# Patient Record
Sex: MALE | Race: WHITE | NOT HISPANIC OR LATINO | Employment: OTHER | URBAN - METROPOLITAN AREA
[De-identification: names, ages, dates, MRNs, and addresses within clinical notes are randomized per-mention and may not be internally consistent; named-entity substitution may affect disease eponyms.]

---

## 2017-03-22 DIAGNOSIS — Z00.00 WELL ADULT EXAM: Primary | ICD-10-CM

## 2017-03-23 ENCOUNTER — RESULTS ENCOUNTER (OUTPATIENT)
Dept: FAMILY MEDICINE CLINIC | Facility: CLINIC | Age: 67
End: 2017-03-23

## 2017-03-23 DIAGNOSIS — Z00.00 WELL ADULT EXAM: ICD-10-CM

## 2017-03-23 LAB
CHOLEST SERPL-MCNC: 216 MG/DL (ref 0–200)
HDLC SERPL-MCNC: 65 MG/DL (ref 40–60)
LDLC SERPL CALC-MCNC: 139 MG/DL (ref 0–100)
LDLC/HDLC SERPL: 2.14 {RATIO}
TRIGL SERPL-MCNC: 61 MG/DL (ref 0–150)
VLDLC SERPL CALC-MCNC: 12.2 MG/DL (ref 5–40)

## 2017-03-24 ENCOUNTER — TELEPHONE (OUTPATIENT)
Dept: FAMILY MEDICINE CLINIC | Facility: CLINIC | Age: 67
End: 2017-03-24

## 2017-03-24 NOTE — TELEPHONE ENCOUNTER
----- Message from Ofelia Bradford MD sent at 3/24/2017  6:16 AM EDT -----  Lipids are only back. I ordered the whole panel.  LDL too high      We tried to get him to do all. He said NO it was only the lipids he was to have. sorry

## 2017-03-28 ENCOUNTER — TELEPHONE (OUTPATIENT)
Dept: FAMILY MEDICINE CLINIC | Facility: CLINIC | Age: 67
End: 2017-03-28

## 2017-03-28 RX ORDER — ATORVASTATIN CALCIUM 10 MG/1
10 TABLET, FILM COATED ORAL DAILY
Qty: 90 TABLET | Refills: 1 | Status: SHIPPED | OUTPATIENT
Start: 2017-03-28 | End: 2017-09-30 | Stop reason: SDUPTHER

## 2017-03-28 NOTE — TELEPHONE ENCOUNTER
i spoke to pt about labs. He thought you where going to put him back on lipitor if numbers were not better. [facility] [place]. Thanks    This may be a duplicate message

## 2017-05-22 DIAGNOSIS — E78.5 HYPERLIPIDEMIA, UNSPECIFIED HYPERLIPIDEMIA TYPE: ICD-10-CM

## 2017-05-22 DIAGNOSIS — R79.89 ELEVATED LFTS: Primary | ICD-10-CM

## 2017-05-22 LAB
ALBUMIN SERPL-MCNC: 4.5 G/DL (ref 3.5–5.2)
ALBUMIN/GLOB SERPL: 2.3 G/DL
ALP SERPL-CCNC: 52 U/L (ref 39–117)
ALT SERPL-CCNC: 31 U/L (ref 1–41)
AST SERPL-CCNC: 22 U/L (ref 1–40)
BILIRUB SERPL-MCNC: 0.6 MG/DL (ref 0.1–1.2)
BUN SERPL-MCNC: 8 MG/DL (ref 8–23)
BUN/CREAT SERPL: 8.2 (ref 7–25)
CALCIUM SERPL-MCNC: 9.6 MG/DL (ref 8.6–10.5)
CHLORIDE SERPL-SCNC: 101 MMOL/L (ref 98–107)
CHOLEST SERPL-MCNC: 137 MG/DL (ref 0–200)
CO2 SERPL-SCNC: 26.2 MMOL/L (ref 22–29)
CREAT SERPL-MCNC: 0.97 MG/DL (ref 0.76–1.27)
GLOBULIN SER CALC-MCNC: 2 GM/DL
GLUCOSE SERPL-MCNC: 94 MG/DL (ref 65–99)
HDLC SERPL-MCNC: 63 MG/DL (ref 40–60)
LDLC SERPL CALC-MCNC: 65 MG/DL (ref 0–100)
LDLC/HDLC SERPL: 1.03 {RATIO}
POTASSIUM SERPL-SCNC: 4.7 MMOL/L (ref 3.5–5.2)
PROT SERPL-MCNC: 6.5 G/DL (ref 6–8.5)
SODIUM SERPL-SCNC: 139 MMOL/L (ref 136–145)
TRIGL SERPL-MCNC: 47 MG/DL (ref 0–150)
VLDLC SERPL CALC-MCNC: 9.4 MG/DL (ref 5–40)

## 2017-10-02 RX ORDER — ATORVASTATIN CALCIUM 10 MG/1
TABLET, FILM COATED ORAL
Qty: 90 TABLET | Refills: 1 | Status: SHIPPED | OUTPATIENT
Start: 2017-10-02 | End: 2018-03-22 | Stop reason: SDUPTHER

## 2018-03-13 DIAGNOSIS — Z00.00 ANNUAL PHYSICAL EXAM: Primary | ICD-10-CM

## 2018-03-14 ENCOUNTER — RESULTS ENCOUNTER (OUTPATIENT)
Dept: FAMILY MEDICINE CLINIC | Facility: CLINIC | Age: 68
End: 2018-03-14

## 2018-03-14 DIAGNOSIS — Z00.00 ANNUAL PHYSICAL EXAM: ICD-10-CM

## 2018-03-14 LAB
25(OH)D3+25(OH)D2 SERPL-MCNC: 46.9 NG/ML (ref 30–100)
ALBUMIN SERPL-MCNC: 4.5 G/DL (ref 3.5–5.2)
ALBUMIN/GLOB SERPL: 2 G/DL
ALP SERPL-CCNC: 58 U/L (ref 39–117)
ALT SERPL-CCNC: 36 U/L (ref 1–41)
APPEARANCE UR: CLEAR
AST SERPL-CCNC: 26 U/L (ref 1–40)
BASOPHILS # BLD AUTO: 0.02 10*3/MM3 (ref 0–0.2)
BASOPHILS NFR BLD AUTO: 0.4 % (ref 0–1.5)
BILIRUB SERPL-MCNC: 0.7 MG/DL (ref 0.1–1.2)
BILIRUB UR QL STRIP: NEGATIVE
BUN SERPL-MCNC: 9 MG/DL (ref 8–23)
BUN/CREAT SERPL: 10.3 (ref 7–25)
CALCIUM SERPL-MCNC: 9.7 MG/DL (ref 8.6–10.5)
CHLORIDE SERPL-SCNC: 98 MMOL/L (ref 98–107)
CHOLEST SERPL-MCNC: 146 MG/DL (ref 0–200)
CO2 SERPL-SCNC: 26.9 MMOL/L (ref 22–29)
COLOR UR: YELLOW
CREAT SERPL-MCNC: 0.87 MG/DL (ref 0.76–1.27)
EOSINOPHIL # BLD AUTO: 0.22 10*3/MM3 (ref 0–0.7)
EOSINOPHIL NFR BLD AUTO: 4.2 % (ref 0.3–6.2)
ERYTHROCYTE [DISTWIDTH] IN BLOOD BY AUTOMATED COUNT: 13.8 % (ref 11.5–14.5)
GFR SERPLBLD CREATININE-BSD FMLA CKD-EPI: 106 ML/MIN/1.73
GFR SERPLBLD CREATININE-BSD FMLA CKD-EPI: 88 ML/MIN/1.73
GLOBULIN SER CALC-MCNC: 2.2 GM/DL
GLUCOSE SERPL-MCNC: 93 MG/DL (ref 65–99)
GLUCOSE UR QL: NEGATIVE
HCT VFR BLD AUTO: 49.9 % (ref 40.4–52.2)
HDLC SERPL-MCNC: 55 MG/DL (ref 40–60)
HGB BLD-MCNC: 16.6 G/DL (ref 13.7–17.6)
HGB UR QL STRIP: NEGATIVE
IMM GRANULOCYTES # BLD: 0.02 10*3/MM3 (ref 0–0.03)
IMM GRANULOCYTES NFR BLD: 0.4 % (ref 0–0.5)
KETONES UR QL STRIP: NEGATIVE
LDLC SERPL CALC-MCNC: 79 MG/DL (ref 0–100)
LDLC/HDLC SERPL: 1.44 {RATIO}
LEUKOCYTE ESTERASE UR QL STRIP: NEGATIVE
LYMPHOCYTES # BLD AUTO: 1.06 10*3/MM3 (ref 0.9–4.8)
LYMPHOCYTES NFR BLD AUTO: 20.1 % (ref 19.6–45.3)
MCH RBC QN AUTO: 30.7 PG (ref 27–32.7)
MCHC RBC AUTO-ENTMCNC: 33.3 G/DL (ref 32.6–36.4)
MCV RBC AUTO: 92.4 FL (ref 79.8–96.2)
MONOCYTES # BLD AUTO: 0.57 10*3/MM3 (ref 0.2–1.2)
MONOCYTES NFR BLD AUTO: 10.8 % (ref 5–12)
NEUTROPHILS # BLD AUTO: 3.38 10*3/MM3 (ref 1.9–8.1)
NEUTROPHILS NFR BLD AUTO: 64.1 % (ref 42.7–76)
NITRITE UR QL STRIP: NEGATIVE
PH UR STRIP: 7.5 [PH] (ref 5–8)
PLATELET # BLD AUTO: 189 10*3/MM3 (ref 140–500)
POTASSIUM SERPL-SCNC: 4.4 MMOL/L (ref 3.5–5.2)
PROT SERPL-MCNC: 6.7 G/DL (ref 6–8.5)
PROT UR QL STRIP: NEGATIVE
PSA SERPL-MCNC: 1.01 NG/ML (ref 0–4)
RBC # BLD AUTO: 5.4 10*6/MM3 (ref 4.6–6)
SODIUM SERPL-SCNC: 138 MMOL/L (ref 136–145)
SP GR UR: 1.01 (ref 1–1.03)
T4 FREE SERPL-MCNC: 1.19 NG/DL (ref 0.93–1.7)
TRIGL SERPL-MCNC: 60 MG/DL (ref 0–150)
TSH SERPL DL<=0.005 MIU/L-ACNC: 4.3 MIU/ML (ref 0.27–4.2)
UROBILINOGEN UR STRIP-MCNC: NORMAL MG/DL
VLDLC SERPL CALC-MCNC: 12 MG/DL (ref 5–40)
WBC # BLD AUTO: 5.27 10*3/MM3 (ref 4.5–10.7)

## 2018-03-22 ENCOUNTER — OFFICE VISIT (OUTPATIENT)
Dept: FAMILY MEDICINE CLINIC | Facility: CLINIC | Age: 68
End: 2018-03-22

## 2018-03-22 VITALS
RESPIRATION RATE: 18 BRPM | WEIGHT: 193.5 LBS | SYSTOLIC BLOOD PRESSURE: 128 MMHG | BODY MASS INDEX: 28.66 KG/M2 | OXYGEN SATURATION: 98 % | HEART RATE: 66 BPM | DIASTOLIC BLOOD PRESSURE: 73 MMHG | HEIGHT: 69 IN | TEMPERATURE: 98.2 F

## 2018-03-22 DIAGNOSIS — E78.5 HYPERLIPIDEMIA, UNSPECIFIED HYPERLIPIDEMIA TYPE: ICD-10-CM

## 2018-03-22 DIAGNOSIS — R25.1 TREMOR OF BOTH HANDS: ICD-10-CM

## 2018-03-22 DIAGNOSIS — R03.0 ELEVATED BLOOD-PRESSURE READING WITHOUT DIAGNOSIS OF HYPERTENSION: ICD-10-CM

## 2018-03-22 DIAGNOSIS — Z23 ENCOUNTER FOR IMMUNIZATION: Primary | ICD-10-CM

## 2018-03-22 DIAGNOSIS — R79.89 ELEVATED TSH: ICD-10-CM

## 2018-03-22 DIAGNOSIS — Z00.00 WELL ADULT EXAM: ICD-10-CM

## 2018-03-22 DIAGNOSIS — Z00.00 HEALTHCARE MAINTENANCE: ICD-10-CM

## 2018-03-22 PROCEDURE — 90471 IMMUNIZATION ADMIN: CPT | Performed by: INTERNAL MEDICINE

## 2018-03-22 PROCEDURE — G0009 ADMIN PNEUMOCOCCAL VACCINE: HCPCS | Performed by: INTERNAL MEDICINE

## 2018-03-22 PROCEDURE — 90714 TD VACC NO PRESV 7 YRS+ IM: CPT | Performed by: INTERNAL MEDICINE

## 2018-03-22 PROCEDURE — 99397 PER PM REEVAL EST PAT 65+ YR: CPT | Performed by: INTERNAL MEDICINE

## 2018-03-22 PROCEDURE — 90670 PCV13 VACCINE IM: CPT | Performed by: INTERNAL MEDICINE

## 2018-03-22 RX ORDER — CLONAZEPAM 0.5 MG/1
0.5 TABLET ORAL DAILY PRN
Qty: 90 TABLET | Refills: 0 | Status: SHIPPED | OUTPATIENT
Start: 2018-03-22 | End: 2019-08-23 | Stop reason: SDUPTHER

## 2018-03-22 RX ORDER — ATORVASTATIN CALCIUM 10 MG/1
10 TABLET, FILM COATED ORAL DAILY
Qty: 90 TABLET | Refills: 1 | Status: SHIPPED | OUTPATIENT
Start: 2018-03-22 | End: 2018-10-18 | Stop reason: SDUPTHER

## 2018-03-22 NOTE — PROGRESS NOTES
Subjective   Paul Sterling is a 68 y.o. male who comes in today for   Chief Complaint   Patient presents with   • Annual Exam     no cxr. did sign ABN    .    History of Present Illness   Here for CPE.  Has had 6-10 episodes of vertigo past 18 months.  Saw ENT Dr. Riley.  Only starts in night and room spins and is started with rolling over and last for about 24 hours but does improve throughout the day.  They checked his hearing and it is at his baseline.  He has tinnitus that comes and goes and really doesn't bother him.  Vision is normal.  No ha but did have ha as a younger man.  No N/V.  His blood pressure usually runs 140-165/100-120 at home and at [facility] the other day it was elevated too.  Has HL on lipitor and baby asa/day.  He is due for cscope due to polyp and sees Dr. Gordon and he sent in paper work yesterday. Has h/o OAB and has failed vesicare, ditropan due to dry mouth.  2 x nocturia and a lot of urinary urgency and frequency    The following portions of the patient's history were reviewed and updated as appropriate: allergies, current medications, past family history, past medical history, past social history, past surgical history and problem list.    Review of Systems   HENT: Positive for hearing loss (long standing) and tinnitus.    Eyes: Negative.    Respiratory: Negative.    Cardiovascular: Negative.  Negative for palpitations.   Gastrointestinal: Negative.    Neurological:        Vertigo episodes       Vitals:    03/22/18 1058   BP: 128/73   Resp: 18   Temp: 98.2 °F (36.8 °C)   SpO2: 98%       Objective   Physical Exam   Constitutional: He is oriented to person, place, and time. He appears well-developed and well-nourished.   HENT:   Head: Normocephalic and atraumatic.   Right Ear: External ear normal.   Left Ear: External ear normal.   Mouth/Throat: Oropharynx is clear and moist.   Eyes: Conjunctivae and EOM are normal. Pupils are equal, round, and reactive to light.   Neck: Neck supple.  No thyromegaly present.   Cardiovascular: Normal rate, regular rhythm, normal heart sounds and intact distal pulses.    Pulmonary/Chest: Effort normal and breath sounds normal.   Abdominal: Soft. Bowel sounds are normal. He exhibits no distension and no mass. There is no tenderness.   Genitourinary: Prostate normal. Rectal exam shows guaiac negative stool.   Lymphadenopathy:     He has no cervical adenopathy.   Neurological: He is alert and oriented to person, place, and time. He has normal reflexes.   Skin: Skin is warm.   Psychiatric: He has a normal mood and affect. His behavior is normal. Judgment and thought content normal.   Nursing note and vitals reviewed.      Assessment/Plan   Paul was seen today for annual exam.    Diagnoses and all orders for this visit:    Encounter for immunization  -     Cancel: Tdap Vaccine Greater Than or Equal To 8yo IM  -     Pneumococcal Conjugate Vaccine 13-Valent All  -     Td (adult) Vaccine Not Adsorbed    Tremor of both hands    Hyperlipidemia, unspecified hyperlipidemia type    Elevated blood-pressure reading without diagnosis of hypertension    Elevated TSH  -     T3, Free; Future  -     T4, Free; Future  -     TSH; Future  -     Anti-Thyroglobulin Antibody; Future  -     Thyroid Peroxidase Antibody; Future  -     Hepatitis C Antibody; Future    Healthcare maintenance  -     T3, Free; Future  -     T4, Free; Future  -     TSH; Future  -     Anti-Thyroglobulin Antibody; Future  -     Thyroid Peroxidase Antibody; Future  -     Hepatitis C Antibody; Future    Other orders  -     clonazePAM (KlonoPIN) 0.5 MG tablet; Take 1 tablet by mouth Daily As Needed (HAND TREMORS).  -     atorvastatin (LIPITOR) 10 MG tablet; Take 1 tablet by mouth Daily.    PCV today  Td today  Recheck TSH in 2 -3 months due to elevation and check thyroid antibodies  Refill klonapin for essential tremors.  #90 lasted him 18 months  Refill lipitor  He will monitor his bp and get back to me if it continues  to run on the higher side.    We discussed vertigo and that hydration and movement are often triggers.  He is getting a new bed where he can elevated HOB due to his long standing GERD sx's.  If this doesn't help, he will call me for vestibular PT  For his OAB sx's, I have given him samples of myrbetriq 50mg qd and cautioned him to monitor his blood pressure.  He will call for rx if he likes it.  Prostate is normal.  psa normal.                 I have asked for the patient to return to clinic in 12month(s).

## 2018-03-23 PROCEDURE — 93000 ELECTROCARDIOGRAM COMPLETE: CPT | Performed by: INTERNAL MEDICINE

## 2018-03-23 NOTE — PROGRESS NOTES
Procedure     ECG 12 Lead  Date/Time: 3/23/2018 8:15 AM  Performed by: JEAN-PIERRE RESENDEZ  Authorized by: JEAN-PIERRE RESENDEZ   Comparison: compared with previous ECG   Similar to previous ECG  Rhythm: sinus rhythm  Rate: normal  Conduction: conduction normal  ST Segments: ST segments normal  T Waves: T waves normal  QRS axis: normal  Clinical impression: normal ECG

## 2018-03-27 ENCOUNTER — PREP FOR SURGERY (OUTPATIENT)
Dept: OTHER | Facility: HOSPITAL | Age: 68
End: 2018-03-27

## 2018-03-27 DIAGNOSIS — Z12.11 COLON CANCER SCREENING: Primary | ICD-10-CM

## 2018-03-27 DIAGNOSIS — Z87.19 HISTORY OF BARRETT'S ESOPHAGUS: ICD-10-CM

## 2018-03-27 DIAGNOSIS — K62.5 RECTAL BLEEDING: ICD-10-CM

## 2018-03-27 DIAGNOSIS — K21.9 GASTROESOPHAGEAL REFLUX DISEASE, ESOPHAGITIS PRESENCE NOT SPECIFIED: ICD-10-CM

## 2018-03-27 DIAGNOSIS — Z83.71 FAMILY HISTORY OF COLONIC POLYPS: ICD-10-CM

## 2018-03-27 DIAGNOSIS — Z80.0 FAMILY HISTORY OF COLON CANCER: ICD-10-CM

## 2018-04-03 ENCOUNTER — TELEPHONE (OUTPATIENT)
Dept: FAMILY MEDICINE CLINIC | Facility: CLINIC | Age: 68
End: 2018-04-03

## 2018-04-03 NOTE — TELEPHONE ENCOUNTER
Patient called to let Dr Bradford know that he did like the sample of the MYRBETRIQ 50MG, he is ok with starting a full prescription. Patient also mentioned that it would probably not be approved by insurance and wanted to know the process he should  take

## 2018-04-04 NOTE — TELEPHONE ENCOUNTER
Per pt he has tried detrol vesicare and ditropan all causing the dry mouth.     The myrbetriq is working great and not causing any dry mouth

## 2018-04-04 NOTE — TELEPHONE ENCOUNTER
I sent the prescription to [facility] and once they process the prescription, we will run the PA if needed.  He has failed oxybutynin and another one? detrol or vesicare or ditropan?  Once we complete the form saying he failed those (dry mouth and side effects), they may approve myrbetriq

## 2018-04-12 PROBLEM — Z12.11 COLON CANCER SCREENING: Status: ACTIVE | Noted: 2018-04-12

## 2018-04-12 PROBLEM — Z83.719 FAMILY HISTORY OF COLONIC POLYPS: Status: ACTIVE | Noted: 2018-04-12

## 2018-04-12 PROBLEM — K21.9 GASTROESOPHAGEAL REFLUX DISEASE: Status: ACTIVE | Noted: 2018-04-12

## 2018-04-12 PROBLEM — Z87.19 HISTORY OF BARRETT'S ESOPHAGUS: Status: ACTIVE | Noted: 2018-04-12

## 2018-04-12 PROBLEM — Z80.0 FAMILY HISTORY OF COLON CANCER: Status: ACTIVE | Noted: 2018-04-12

## 2018-04-12 PROBLEM — K62.5 RECTAL BLEEDING: Status: ACTIVE | Noted: 2018-04-12

## 2018-04-12 PROBLEM — Z83.71 FAMILY HISTORY OF COLONIC POLYPS: Status: ACTIVE | Noted: 2018-04-12

## 2018-04-24 ENCOUNTER — TELEPHONE (OUTPATIENT)
Dept: FAMILY MEDICINE CLINIC | Facility: CLINIC | Age: 68
End: 2018-04-24

## 2018-04-24 DIAGNOSIS — Z79.899 HIGH RISK MEDICATION USE: Primary | ICD-10-CM

## 2018-04-24 RX ORDER — LOSARTAN POTASSIUM 25 MG/1
25 TABLET ORAL DAILY
Qty: 30 TABLET | Refills: 1 | Status: SHIPPED | OUTPATIENT
Start: 2018-04-24 | End: 2018-06-25 | Stop reason: SDUPTHER

## 2018-04-24 NOTE — TELEPHONE ENCOUNTER
Patients blood pressures are elevated.   (please see scanned document)   Left a message for pt to call me back and let me know if he is okay with starting losartan 25mg daily and then rechecking labs(CMP) in 2 weeks

## 2018-05-07 DIAGNOSIS — E87.5 SERUM POTASSIUM ELEVATED: Primary | ICD-10-CM

## 2018-05-08 ENCOUNTER — RESULTS ENCOUNTER (OUTPATIENT)
Dept: FAMILY MEDICINE CLINIC | Facility: CLINIC | Age: 68
End: 2018-05-08

## 2018-05-08 DIAGNOSIS — Z79.899 HIGH RISK MEDICATION USE: ICD-10-CM

## 2018-05-09 LAB
ALBUMIN SERPL-MCNC: 4.4 G/DL (ref 3.5–5.2)
ALBUMIN/GLOB SERPL: 2.6 G/DL
ALP SERPL-CCNC: 52 U/L (ref 39–117)
ALT SERPL-CCNC: 36 U/L (ref 1–41)
AST SERPL-CCNC: 28 U/L (ref 1–40)
BILIRUB SERPL-MCNC: 0.6 MG/DL (ref 0.1–1.2)
BUN SERPL-MCNC: 13 MG/DL (ref 8–23)
BUN/CREAT SERPL: 14.4 (ref 7–25)
CALCIUM SERPL-MCNC: 9.3 MG/DL (ref 8.6–10.5)
CHLORIDE SERPL-SCNC: 102 MMOL/L (ref 98–107)
CO2 SERPL-SCNC: 24.8 MMOL/L (ref 22–29)
CREAT SERPL-MCNC: 0.9 MG/DL (ref 0.76–1.27)
GFR SERPLBLD CREATININE-BSD FMLA CKD-EPI: 102 ML/MIN/1.73
GFR SERPLBLD CREATININE-BSD FMLA CKD-EPI: 84 ML/MIN/1.73
GLOBULIN SER CALC-MCNC: 1.7 GM/DL
GLUCOSE SERPL-MCNC: 101 MG/DL (ref 65–99)
POTASSIUM SERPL-SCNC: 4.3 MMOL/L (ref 3.5–5.2)
PROT SERPL-MCNC: 6.1 G/DL (ref 6–8.5)
SODIUM SERPL-SCNC: 141 MMOL/L (ref 136–145)

## 2018-06-20 ENCOUNTER — ANESTHESIA (OUTPATIENT)
Dept: GASTROENTEROLOGY | Facility: HOSPITAL | Age: 68
End: 2018-06-20

## 2018-06-20 ENCOUNTER — RESULTS ENCOUNTER (OUTPATIENT)
Dept: FAMILY MEDICINE CLINIC | Facility: CLINIC | Age: 68
End: 2018-06-20

## 2018-06-20 ENCOUNTER — HOSPITAL ENCOUNTER (OUTPATIENT)
Facility: HOSPITAL | Age: 68
Setting detail: HOSPITAL OUTPATIENT SURGERY
Discharge: HOME | End: 2018-06-20
Attending: SURGERY | Admitting: SURGERY

## 2018-06-20 ENCOUNTER — ANESTHESIA EVENT (OUTPATIENT)
Dept: GASTROENTEROLOGY | Facility: HOSPITAL | Age: 68
End: 2018-06-20

## 2018-06-20 VITALS
WEIGHT: 188.13 LBS | SYSTOLIC BLOOD PRESSURE: 116 MMHG | HEART RATE: 66 BPM | BODY MASS INDEX: 27.86 KG/M2 | TEMPERATURE: 98.3 F | HEIGHT: 69 IN | DIASTOLIC BLOOD PRESSURE: 81 MMHG | RESPIRATION RATE: 12 BRPM | OXYGEN SATURATION: 100 %

## 2018-06-20 DIAGNOSIS — Z80.0 FAMILY HISTORY OF COLON CANCER: ICD-10-CM

## 2018-06-20 DIAGNOSIS — Z83.71 FAMILY HISTORY OF COLONIC POLYPS: ICD-10-CM

## 2018-06-20 DIAGNOSIS — K21.9 GASTROESOPHAGEAL REFLUX DISEASE, ESOPHAGITIS PRESENCE NOT SPECIFIED: ICD-10-CM

## 2018-06-20 DIAGNOSIS — K62.5 RECTAL BLEEDING: ICD-10-CM

## 2018-06-20 DIAGNOSIS — Z87.19 HISTORY OF BARRETT'S ESOPHAGUS: ICD-10-CM

## 2018-06-20 DIAGNOSIS — R79.89 ELEVATED TSH: ICD-10-CM

## 2018-06-20 DIAGNOSIS — Z00.00 HEALTHCARE MAINTENANCE: ICD-10-CM

## 2018-06-20 DIAGNOSIS — Z12.11 COLON CANCER SCREENING: ICD-10-CM

## 2018-06-20 PROCEDURE — S0260 H&P FOR SURGERY: HCPCS | Performed by: SURGERY

## 2018-06-20 PROCEDURE — 45380 COLONOSCOPY AND BIOPSY: CPT | Performed by: SURGERY

## 2018-06-20 PROCEDURE — 43239 EGD BIOPSY SINGLE/MULTIPLE: CPT | Performed by: SURGERY

## 2018-06-20 PROCEDURE — 88305 TISSUE EXAM BY PATHOLOGIST: CPT | Performed by: SURGERY

## 2018-06-20 PROCEDURE — 25010000002 PROPOFOL 10 MG/ML EMULSION: Performed by: ANESTHESIOLOGY

## 2018-06-20 RX ORDER — SODIUM CHLORIDE, SODIUM LACTATE, POTASSIUM CHLORIDE, CALCIUM CHLORIDE 600; 310; 30; 20 MG/100ML; MG/100ML; MG/100ML; MG/100ML
1000 INJECTION, SOLUTION INTRAVENOUS CONTINUOUS
Status: DISCONTINUED | OUTPATIENT
Start: 2018-06-20 | End: 2018-06-20 | Stop reason: HOSPADM

## 2018-06-20 RX ORDER — LIDOCAINE HYDROCHLORIDE 10 MG/ML
0.5 INJECTION, SOLUTION INFILTRATION; PERINEURAL ONCE AS NEEDED
Status: DISCONTINUED | OUTPATIENT
Start: 2018-06-20 | End: 2018-06-20 | Stop reason: HOSPADM

## 2018-06-20 RX ORDER — LIDOCAINE HYDROCHLORIDE 20 MG/ML
INJECTION, SOLUTION INFILTRATION; PERINEURAL AS NEEDED
Status: DISCONTINUED | OUTPATIENT
Start: 2018-06-20 | End: 2018-06-20 | Stop reason: SURG

## 2018-06-20 RX ORDER — SODIUM CHLORIDE 0.9 % (FLUSH) 0.9 %
3 SYRINGE (ML) INJECTION AS NEEDED
Status: DISCONTINUED | OUTPATIENT
Start: 2018-06-20 | End: 2018-06-20 | Stop reason: HOSPADM

## 2018-06-20 RX ORDER — PROPOFOL 10 MG/ML
VIAL (ML) INTRAVENOUS AS NEEDED
Status: DISCONTINUED | OUTPATIENT
Start: 2018-06-20 | End: 2018-06-20 | Stop reason: SURG

## 2018-06-20 RX ADMIN — LIDOCAINE HYDROCHLORIDE 100 MG: 20 INJECTION, SOLUTION INFILTRATION; PERINEURAL at 08:00

## 2018-06-20 RX ADMIN — PROPOFOL 550 MG: 10 INJECTION, EMULSION INTRAVENOUS at 08:00

## 2018-06-20 RX ADMIN — SODIUM CHLORIDE, POTASSIUM CHLORIDE, SODIUM LACTATE AND CALCIUM CHLORIDE 1000 ML: 600; 310; 30; 20 INJECTION, SOLUTION INTRAVENOUS at 07:32

## 2018-06-20 RX ADMIN — SODIUM CHLORIDE, POTASSIUM CHLORIDE, SODIUM LACTATE AND CALCIUM CHLORIDE: 600; 310; 30; 20 INJECTION, SOLUTION INTRAVENOUS at 08:00

## 2018-06-20 NOTE — H&P
Cc: History of Noonan's esophagus, colon polyps and family history of colon cancer                Paul Sterling is a 68 y.o. male who has a history of Noonan's esophagus and last had a EGD and colonoscopy 4 years ago.  Colonoscopy found polyps at that time.  He also has a history of colon cancer in his mother.            Past Medical History:   Diagnosis Date   • Arthritis    • Noonan's esophagus    • Blood in stool    • GERD (gastroesophageal reflux disease)    • History of colonic polyps    • Hyperlipidemia    • Hypertension        Past Surgical History:   Procedure Laterality Date   • CHOLECYSTECTOMY     • COLONOSCOPY  2015   • INGUINAL HERNIA REPAIR Bilateral    • NISSEN FUNDOPLICATION LAPAROSCOPIC     • UMBILICAL HERNIA REPAIR           Current Facility-Administered Medications:   •  lactated ringers infusion 1,000 mL, 1,000 mL, Intravenous, Continuous, Stuart Hernandez MD, Last Rate: 25 mL/hr at 06/20/18 0732, 1,000 mL at 06/20/18 0732  •  lidocaine (XYLOCAINE) 1 % injection 0.5 mL, 0.5 mL, Intradermal, Once PRN, Stuart Hernandez MD  •  sodium chloride 0.9 % flush 3 mL, 3 mL, Intravenous, PRN, Stuart Hernandez MD    No Known Allergies    Family History   Problem Relation Age of Onset   • Malig Hyperthermia Neg Hx        Social History     Social History   • Marital status:      Spouse name: N/A   • Number of children: N/A   • Years of education: N/A     Occupational History   • Not on file.     Social History Main Topics   • Smoking status: Never Smoker   • Smokeless tobacco: Never Used   • Alcohol use Yes   • Drug use: No   • Sexual activity: Not on file     Other Topics Concern   • Not on file     Social History Narrative   • No narrative on file       ROS: 14 systems were reviewed and negative other than presenting complaints.    Vitals:    06/20/18 0725   BP: 134/90   Pulse: 64   Resp: 16   Temp: 97.8 °F (36.6 °C)   SpO2: 97%       PHYSICAL EXAM:  - Constitutional: Well-developed  well-nourished, no acute distress  - Eyes: Conjunctiva normal, sclera nonicteric  - ENMT: Hearing grossly normal, oral mucosa moist  - Respiratory: Clear to auscultation, normal inspiratory effort  - Cardiovascular: Regular rate, no peripheral edema, no jugular venous distention  - Gastrointestinal: Soft, nontender, no palpable mass, no hepatosplenomegaly  - Skin: Warm, dry  - Musculoskeletal: Symmetric strength, normal gait  - Psychiatric: Alert and oriented ×3, normal affect          Assessment/Plan     · History of Noonan's disease of the esophagus  · Personal history of colon Polyps  · Family history colon cancer    Plan for EGD and colonoscopy today.    Indications, risks and procedure were discussed with the patient including but not limited to bleeding, infection, possibility of perforation and possible polypectomy. All of the patient's questions were answered and they would like to proceed with the above recommendations.    Stuart Hernandez MD  General and Endoscopic Surgery  [facility]    [address], [address]  [place], [place], [address]  P: [phone]  F: [phone]

## 2018-06-20 NOTE — OP NOTE
Operative Note :   Stuart Hernandez MD    Paul Harveymikel  1950    Procedure Date: 06/20/18    Pre-op Diagnosis:  · Personal history of Noonan's disease  · Personal history of colon polyps  · Family history of colon cancer    Post-op Diagnosis:  · Mild esophagitis  · Small hiatal hernia  · Colon polyps  · Hemorrhoids    Procedure:   · Esophagogastroduodenoscopy with biopsy of distal esophagus  · Colonoscopy to cecum with cold forceps polypectomy    Surgeon: Stuart Hernandez MD      Anesthesia: MAC    Indications:  · 68-year-old gentleman who last had endoscopy 4 years ago.  He carries a personal history of Noonan's disease of the esophagus and had polyps found at his last colonoscopy.    Findings:   · Small hiatal hernia  · Mild irregularity of the distal esophagus consistent with possible esophagitis  · Benign appearing colon polyps  · Single small mildly inflamed hemorrhoid    Recommendations:   · Follow up on pathology to give recommendations on repeat endoscopy    Description of procedure:    After obtaining informed consent, patient was brought to the endoscopy suite and was sedated.  Flexible endoscope was inserted into the mouth and passed through the cricopharyngeus into the esophagus and through the GE junction into the stomach and then passed the pylorus and into the duodenum.  The duodenum had an unremarkable appearance.  The junction of the second and third portion of the duodenum reached.  Scope was then pulled back.  The papilla was seen and was unremarkable.  The pyloric channel and duodenal bulb were normal.  Stomach was unremarkable.  No ulceration.  Scope was retroflexed and there was a small sliding type hiatus hernia noted.  Scope was then pulled back to the level of the GE junction which showed only very mild irregularity.  Biopsies were taken a couple of centimeters above the Z line.  Remainder of the esophagus was unremarkable.  The patient was then turned around and prepared for  colonoscopy.  Digital rectal exam was undertaken and was remarkable only for a small hemorrhoid.  The flexible Olympus endoscope was inserted into the rectum and passed around with moderate difficulty requiring external pressure to reach the level of the cecum.  The colonoscope was then slowly withdrawn, carefully visualizing all mucosal surfaces.  Cecum was normal.  In the ascending colon there was a single small benign-appearing polyp which was removed with the polypectomy forceps.  At the hepatic flexure there were a couple of small benign-appearing polyps which were removed.  The transverse colon and splenic flexure were normal.  In the descending colon at about 65 cm another single benign-appearing polyp was removed with the polypectomy forceps.  Sigmoid colon was normal.  No diverticula were identified.  The rectum was unremarkable.  In the anal canal there was a small single mildly inflamed hemorrhoid noted.  No bleeding.  Scope was removed completely.  The patient tolerated the procedure well.      Stuart Hernandez MD  General and Endoscopic Surgery  [facility]    [address], [address]  [place], [place], [address]  P: [phone]  F: [phone]

## 2018-06-20 NOTE — ANESTHESIA POSTPROCEDURE EVALUATION
"Patient: Paul Sterling    Procedure Summary     Date:  06/20/18 Room / Location:   [facility]    Anesthesia Start:  0804 Anesthesia Stop:  0849    Procedures:       ESOPHAGOGASTRODUODENOSCOPY with bx (N/A Esophagus)      COLONOSCOPY to ccecum with cold bx polypectomy (N/A ) Diagnosis:       Rectal bleeding      Family history of colon cancer      Family history of colonic polyps      Colon cancer screening      History of Noonan's esophagus      Gastroesophageal reflux disease, esophagitis presence not specified      (Rectal bleeding [K62.5])      (Family history of colon cancer [Z80.0])      (Family history of colonic polyps [Z83.71])      (Colon cancer screening [Z12.11])      (History of Noonan's esophagus [Z87.19])      (Gastroesophageal reflux disease, esophagitis presence not specified [K21.9])    Surgeon:  Stuart Hernandez MD Provider:  Yvon Joaquin MD    Anesthesia Type:  MAC ASA Status:  3          Anesthesia Type: MAC  Last vitals  BP   109/73 (06/20/18 0847)   Temp   36.8 °C (98.3 °F) (06/20/18 0847)   Pulse   66 (06/20/18 0847)   Resp   12 (06/20/18 0847)     SpO2   98 % (06/20/18 0847)     Post Anesthesia Care and Evaluation    Patient location during evaluation: bedside  Patient participation: complete - patient participated  Level of consciousness: awake and alert  Pain management: adequate  Airway patency: patent  Anesthetic complications: No anesthetic complications    Cardiovascular status: acceptable  Respiratory status: acceptable  Hydration status: acceptable    Comments: /73   Pulse 66   Temp 36.8 °C (98.3 °F) (Oral)   Resp 12   Ht 175.3 cm (69\")   Wt 85.3 kg (188 lb 2 oz)   SpO2 98%   BMI 27.78 kg/m²       "

## 2018-06-20 NOTE — ANESTHESIA PREPROCEDURE EVALUATION
Anesthesia Evaluation     Patient summary reviewed and Nursing notes reviewed   NPO Solid Status: > 8 hours  NPO Liquid Status: > 8 hours           Airway   Mallampati: II  TM distance: >3 FB  Neck ROM: full  no difficulty expected  Dental - normal exam     Pulmonary - normal exam   Cardiovascular - normal exam    (+) hypertension, hyperlipidemia,       Neuro/Psych  (+) tremors,     GI/Hepatic/Renal/Endo    (+)  GERD, GI bleeding,     Musculoskeletal     Abdominal  - normal exam   Substance History      OB/GYN          Other                        Anesthesia Plan    ASA 3     MAC     Anesthetic plan and risks discussed with patient.

## 2018-06-21 LAB
LAB AP CASE REPORT: NORMAL
Lab: NORMAL
PATH REPORT.FINAL DX SPEC: NORMAL
PATH REPORT.GROSS SPEC: NORMAL

## 2018-06-22 ENCOUNTER — TELEPHONE (OUTPATIENT)
Dept: FAMILY MEDICINE CLINIC | Facility: CLINIC | Age: 68
End: 2018-06-22

## 2018-06-25 RX ORDER — LOSARTAN POTASSIUM 25 MG/1
25 TABLET ORAL DAILY
Qty: 30 TABLET | Refills: 5 | Status: SHIPPED | OUTPATIENT
Start: 2018-06-25 | End: 2018-12-17 | Stop reason: SDUPTHER

## 2018-07-02 ENCOUNTER — TELEPHONE (OUTPATIENT)
Dept: FAMILY MEDICINE CLINIC | Facility: CLINIC | Age: 68
End: 2018-07-02

## 2018-07-02 ENCOUNTER — TELEPHONE (OUTPATIENT)
Dept: SURGERY | Facility: CLINIC | Age: 68
End: 2018-07-02

## 2018-07-02 NOTE — TELEPHONE ENCOUNTER
Pt left VM stating that he was supposed to come and have labs drawn to check kidney functions since starting on Losartan. He was that there were multiple other test ordered that he was unaware of. He would like to only get his kidneys checked unless he missed something. Per pt, there is no need for other test. Please advise.

## 2018-07-02 NOTE — TELEPHONE ENCOUNTER
Please tell patient his esophagus was normal and he had 2 benign polyps.  Recommend repeat colonoscopy in 5 years.  Please place and in recall for that time.

## 2018-07-03 NOTE — TELEPHONE ENCOUNTER
During his CPE, his Tsh was elevated therefore I had ordered additional thyroid studies to f/u on that as well as hep C antibody --for health maintenance purposes.  Those labs can be canceled if he choses

## 2018-09-11 ENCOUNTER — OFFICE VISIT (OUTPATIENT)
Dept: FAMILY MEDICINE CLINIC | Facility: CLINIC | Age: 68
End: 2018-09-11

## 2018-09-11 VITALS
WEIGHT: 192.2 LBS | OXYGEN SATURATION: 99 % | HEART RATE: 70 BPM | BODY MASS INDEX: 28.47 KG/M2 | RESPIRATION RATE: 16 BRPM | DIASTOLIC BLOOD PRESSURE: 78 MMHG | HEIGHT: 69 IN | SYSTOLIC BLOOD PRESSURE: 128 MMHG | TEMPERATURE: 97.8 F

## 2018-09-11 DIAGNOSIS — E78.5 HYPERLIPIDEMIA, UNSPECIFIED HYPERLIPIDEMIA TYPE: Primary | ICD-10-CM

## 2018-09-11 DIAGNOSIS — R25.1 TREMOR OF BOTH HANDS: ICD-10-CM

## 2018-09-11 DIAGNOSIS — I10 HYPERTENSION, UNSPECIFIED TYPE: ICD-10-CM

## 2018-09-11 PROCEDURE — 99214 OFFICE O/P EST MOD 30 MIN: CPT | Performed by: INTERNAL MEDICINE

## 2018-09-11 NOTE — PROGRESS NOTES
Subjective   Paul Sterling is a 68 y.o. male who comes in today for   Chief Complaint   Patient presents with   • Hypertension   .    Hypertension   This is a recurrent problem. The current episode started more than 1 month ago. The problem has been gradually improving since onset. The problem is controlled. Pertinent negatives include no anxiety, blurred vision, chest pain, headaches, malaise/fatigue, neck pain, orthopnea, palpitations, peripheral edema, PND, shortness of breath or sweats. Compliance problems include diet.       HERE FOR F/U ON [facility] FOR HTN . STARTED LOSARTAN 25 MG QD ABOUT 6 MONTHS AGO.  ALSO ON LIPITOR FOR HL.  TAKING CLONAZEPAM FOR ESSENTIAL TREMOR THAT HE RARELY TAKES.  DOESN'T NEED REFILL YET.  DIZZINESS HAS RESOLVED AFTER HE WAS ON THE LOSARTAN FOR A WHILE.  CSCOPE IN 6/2018 SHOWED 3 TUBULAR ADENOMAS . H/O BARRETTS ESOPHAGUS.   SEES DERM TWICE A YEAR DUE TO H/O SCC  The following portions of the patient's history were reviewed and updated as appropriate: allergies, current medications, past family history, past medical history, past social history, past surgical history and problem list.    Review of Systems   Constitutional: Negative.  Negative for malaise/fatigue.   Eyes: Negative for blurred vision.   Respiratory: Negative for shortness of breath.    Cardiovascular: Negative for chest pain, palpitations, orthopnea and PND.   Musculoskeletal: Negative for neck pain.   Neurological: Negative for headaches.       Vitals:    09/11/18 1459   BP: 128/78   Pulse: 70   Resp: 16   Temp: 97.8 °F (36.6 °C)   SpO2: 99%       Objective   Physical Exam   Constitutional: He is oriented to person, place, and time. He appears well-developed and well-nourished.   HENT:   Head: Normocephalic and atraumatic.   Right Ear: External ear normal.   Left Ear: External ear normal.   Mouth/Throat: Oropharynx is clear and moist.   Eyes: Conjunctivae are normal.   Neck: Neck supple.   Cardiovascular: Normal  rate, regular rhythm and normal heart sounds.    No bruits   Pulmonary/Chest: Effort normal and breath sounds normal. No respiratory distress. He has no wheezes. He has no rales.   Abdominal: Soft. Bowel sounds are normal. He exhibits no distension and no mass. There is no tenderness.   Lymphadenopathy:     He has no cervical adenopathy.   Neurological: He is alert and oriented to person, place, and time.   Skin: Skin is warm.   Psychiatric: He has a normal mood and affect. His behavior is normal. Judgment and thought content normal.   Nursing note and vitals reviewed.      Assessment/Plan   Paul was seen today for hypertension.    Diagnoses and all orders for this visit:    Hyperlipidemia, unspecified hyperlipidemia type    Tremor of both hands    Hypertension, unspecified type    CONTINUE LIPITOR FOR HL AND PRN CLONAZEPAM FOR TREMORS.  ROXANNE OK  CONTINUE LOSARTAN 25 MG QD.  CMP NORMAL AFTER INITIATION OF MEDICATION.   F/U MARCH 2019  LABS REVIEWED  PATHOLOGY FROM ENDOSCOPY REVIEWED WITH PT .  I HAVE SENT A NOTE TO DR. BURCH HIS GEN SURGEON ASKING ABOUT WHEN TO RESCOPE HIM.                 I have asked for the patient to return to clinic in 6month(s).

## 2018-09-13 ENCOUNTER — TELEPHONE (OUTPATIENT)
Dept: FAMILY MEDICINE CLINIC | Facility: CLINIC | Age: 68
End: 2018-09-13

## 2018-09-13 NOTE — TELEPHONE ENCOUNTER
----- Message from Ofelia Bradford MD sent at 9/12/2018  2:10 PM EDT -----  Regarding: FW: NEXT CSCOPE  Please let pt know that repeat 3 years for cscope  ----- Message -----  From: Angel Gordon MD  Sent: 9/11/2018   4:18 PM  To: Ofelia Bradford MD  Subject: RE: NEXT CSCOPE                                  I found Stuart David's note which was buried in Epic as a telephone call.  He recommended five-year surveillance and in reviewing his endoscopy report and pathology a agree with that.  Thanks  ----- Message -----  From: Ofelia Bradford MD  Sent: 9/11/2018   3:07 PM  To: Angel Gordon MD  Subject: NEXT CSCOPE                                      OUR MUTUAL PATIENT HAD CSCOPE IN June 2018.   BASED ON HIS PATHOLOGY, WHEN WOULD YOU LIKE HIM TO REPEAT HIS COLONOSCOPY AND UPPER ENDOSCOPY?

## 2018-09-13 NOTE — TELEPHONE ENCOUNTER
Confirmed with dr june that it was 3 years and not 5.    Left message for patient that he is due for one in 2021

## 2018-09-17 ENCOUNTER — TELEPHONE (OUTPATIENT)
Dept: FAMILY MEDICINE CLINIC | Facility: CLINIC | Age: 68
End: 2018-09-17

## 2018-09-18 NOTE — TELEPHONE ENCOUNTER
Pt has lab orders in from June- has not been done yet; in over due task    I left message for pt today to schedule lab only appt to have these done; if he does not want to do them to let me know and I will cancel.

## 2018-10-18 RX ORDER — ATORVASTATIN CALCIUM 10 MG/1
10 TABLET, FILM COATED ORAL DAILY
Qty: 90 TABLET | Refills: 1 | Status: SHIPPED | OUTPATIENT
Start: 2018-10-18 | End: 2019-04-18 | Stop reason: SDUPTHER

## 2018-12-18 RX ORDER — LOSARTAN POTASSIUM 25 MG/1
25 TABLET ORAL DAILY
Qty: 30 TABLET | Refills: 5 | Status: SHIPPED | OUTPATIENT
Start: 2018-12-18 | End: 2019-06-17 | Stop reason: SDUPTHER

## 2019-03-25 RX ORDER — MIRABEGRON 50 MG/1
TABLET, FILM COATED, EXTENDED RELEASE ORAL
Qty: 90 TABLET | Refills: 1 | Status: SHIPPED | OUTPATIENT
Start: 2019-03-25 | End: 2019-12-20 | Stop reason: SDUPTHER

## 2019-04-18 RX ORDER — ATORVASTATIN CALCIUM 10 MG/1
TABLET, FILM COATED ORAL
Qty: 90 TABLET | Refills: 1 | Status: SHIPPED | OUTPATIENT
Start: 2019-04-18 | End: 2019-08-23 | Stop reason: SDUPTHER

## 2019-06-18 RX ORDER — LOSARTAN POTASSIUM 25 MG/1
TABLET ORAL
Qty: 30 TABLET | Refills: 5 | Status: SHIPPED | OUTPATIENT
Start: 2019-06-18 | End: 2019-08-23 | Stop reason: SDUPTHER

## 2019-08-15 DIAGNOSIS — E78.5 HYPERLIPIDEMIA, UNSPECIFIED HYPERLIPIDEMIA TYPE: Primary | ICD-10-CM

## 2019-08-15 DIAGNOSIS — R79.89 ELEVATED TSH: ICD-10-CM

## 2019-08-15 DIAGNOSIS — Z00.00 WELL ADULT EXAM: ICD-10-CM

## 2019-08-15 DIAGNOSIS — E03.9 HYPOTHYROIDISM, UNSPECIFIED TYPE: ICD-10-CM

## 2019-08-15 DIAGNOSIS — I10 HYPERTENSION, UNSPECIFIED TYPE: ICD-10-CM

## 2019-08-16 DIAGNOSIS — Z00.00 WELL ADULT EXAM: ICD-10-CM

## 2019-08-20 LAB
ALBUMIN SERPL-MCNC: 4.6 G/DL (ref 3.5–5.2)
ALBUMIN/GLOB SERPL: 2.6 G/DL
ALP SERPL-CCNC: 49 U/L (ref 39–117)
ALT SERPL-CCNC: 33 U/L (ref 1–41)
AST SERPL-CCNC: 23 U/L (ref 1–40)
BASOPHILS # BLD AUTO: (no result) 10*3/UL
BILIRUB SERPL-MCNC: 0.6 MG/DL (ref 0.2–1.2)
BUN SERPL-MCNC: 11 MG/DL (ref 8–23)
BUN/CREAT SERPL: 12.9 (ref 7–25)
CALCIUM SERPL-MCNC: 9.5 MG/DL (ref 8.6–10.5)
CHLORIDE SERPL-SCNC: 103 MMOL/L (ref 98–107)
CHOLEST SERPL-MCNC: 143 MG/DL (ref 0–200)
CO2 SERPL-SCNC: 25.3 MMOL/L (ref 22–29)
CREAT SERPL-MCNC: 0.85 MG/DL (ref 0.76–1.27)
DIFFERENTIAL COMMENT: ABNORMAL
EOSINOPHIL # BLD AUTO: (no result) 10*3/UL
EOSINOPHIL # BLD MANUAL: 0.08 10*3/MM3 (ref 0–0.4)
EOSINOPHIL NFR BLD AUTO: (no result) %
EOSINOPHIL NFR BLD MANUAL: 2 % (ref 0.3–6.2)
ERYTHROCYTE [DISTWIDTH] IN BLOOD BY AUTOMATED COUNT: 13.7 % (ref 12.3–15.4)
GLOBULIN SER CALC-MCNC: 1.8 GM/DL
GLUCOSE SERPL-MCNC: 105 MG/DL (ref 65–99)
HBA1C MFR BLD: 5.6 % (ref 4.8–5.6)
HCT VFR BLD AUTO: 49.3 % (ref 37.5–51)
HDLC SERPL-MCNC: 51 MG/DL (ref 40–60)
HGB BLD-MCNC: 15.7 G/DL (ref 13–17.7)
LDLC SERPL CALC-MCNC: 79 MG/DL (ref 0–100)
LDLC/HDLC SERPL: 1.55 {RATIO}
LYMPHOCYTES # BLD AUTO: (no result) 10*3/UL
LYMPHOCYTES # BLD MANUAL: 0.81 10*3/MM3 (ref 0.7–3.1)
LYMPHOCYTES NFR BLD AUTO: (no result) %
LYMPHOCYTES NFR BLD MANUAL: 19.4 % (ref 19.6–45.3)
MCH RBC QN AUTO: 30 PG (ref 26.6–33)
MCHC RBC AUTO-ENTMCNC: 31.8 G/DL (ref 31.5–35.7)
MCV RBC AUTO: 94.1 FL (ref 79–97)
MONOCYTES # BLD MANUAL: 0.42 10*3/MM3 (ref 0.1–0.9)
MONOCYTES NFR BLD AUTO: (no result) %
MONOCYTES NFR BLD MANUAL: 10.2 % (ref 5–12)
NEUTROPHILS # BLD MANUAL: 2.84 10*3/MM3 (ref 1.7–7)
NEUTROPHILS NFR BLD AUTO: (no result) %
NEUTROPHILS NFR BLD MANUAL: 68.4 % (ref 42.7–76)
PLATELET # BLD AUTO: 169 10*3/MM3 (ref 140–450)
PLATELET BLD QL SMEAR: ABNORMAL
POTASSIUM SERPL-SCNC: 4.7 MMOL/L (ref 3.5–5.2)
PROT SERPL-MCNC: 6.4 G/DL (ref 6–8.5)
RBC # BLD AUTO: 5.24 10*6/MM3 (ref 4.14–5.8)
RBC MORPH BLD: ABNORMAL
SODIUM SERPL-SCNC: 141 MMOL/L (ref 136–145)
T3FREE SERPL-MCNC: 3.5 PG/ML (ref 2–4.4)
T4 FREE SERPL-MCNC: 1.06 NG/DL (ref 0.93–1.7)
TRIGL SERPL-MCNC: 64 MG/DL (ref 0–150)
TSH SERPL DL<=0.005 MIU/L-ACNC: 2.42 MIU/ML (ref 0.27–4.2)
VLDLC SERPL CALC-MCNC: 12.8 MG/DL
WBC # BLD AUTO: 4.15 10*3/MM3 (ref 3.4–10.8)

## 2019-08-21 DIAGNOSIS — Z12.5 SCREENING FOR PROSTATE CANCER: Primary | ICD-10-CM

## 2019-08-21 LAB
Lab: NORMAL
PSA SERPL-MCNC: 0.55 NG/ML (ref 0–4)
WRITTEN AUTHORIZATION: NORMAL

## 2019-08-23 ENCOUNTER — OFFICE VISIT (OUTPATIENT)
Dept: FAMILY MEDICINE CLINIC | Facility: CLINIC | Age: 69
End: 2019-08-23

## 2019-08-23 VITALS
TEMPERATURE: 98.1 F | OXYGEN SATURATION: 98 % | DIASTOLIC BLOOD PRESSURE: 58 MMHG | BODY MASS INDEX: 28.14 KG/M2 | HEIGHT: 69 IN | WEIGHT: 190 LBS | SYSTOLIC BLOOD PRESSURE: 110 MMHG | HEART RATE: 69 BPM

## 2019-08-23 DIAGNOSIS — Z98.890 HISTORY OF VOCAL CORD POLYPECTOMY: ICD-10-CM

## 2019-08-23 DIAGNOSIS — K92.1 BLOOD IN STOOL: ICD-10-CM

## 2019-08-23 DIAGNOSIS — Z87.19 HISTORY OF BARRETT'S ESOPHAGUS: ICD-10-CM

## 2019-08-23 DIAGNOSIS — Z23 NEED FOR PNEUMOCOCCAL VACCINE: ICD-10-CM

## 2019-08-23 DIAGNOSIS — E78.5 HYPERLIPIDEMIA, UNSPECIFIED HYPERLIPIDEMIA TYPE: Primary | ICD-10-CM

## 2019-08-23 DIAGNOSIS — Z00.00 WELL ADULT EXAM: ICD-10-CM

## 2019-08-23 DIAGNOSIS — R42 VERTIGO: ICD-10-CM

## 2019-08-23 DIAGNOSIS — J38.1 VOCAL CORD POLYP: ICD-10-CM

## 2019-08-23 DIAGNOSIS — R25.1 TREMOR OF BOTH HANDS: ICD-10-CM

## 2019-08-23 LAB
DEVELOPER EXPIRATION DATE: NORMAL
DEVELOPER LOT NUMBER: NORMAL
EXPIRATION DATE: NORMAL
FECAL OCCULT BLOOD SCREEN, POC: NEGATIVE
Lab: NORMAL
NEGATIVE CONTROL: NEGATIVE
POSITIVE CONTROL: POSITIVE

## 2019-08-23 PROCEDURE — 82270 OCCULT BLOOD FECES: CPT | Performed by: INTERNAL MEDICINE

## 2019-08-23 PROCEDURE — 99397 PER PM REEVAL EST PAT 65+ YR: CPT | Performed by: INTERNAL MEDICINE

## 2019-08-23 PROCEDURE — G0009 ADMIN PNEUMOCOCCAL VACCINE: HCPCS | Performed by: INTERNAL MEDICINE

## 2019-08-23 PROCEDURE — 90732 PPSV23 VACC 2 YRS+ SUBQ/IM: CPT | Performed by: INTERNAL MEDICINE

## 2019-08-23 RX ORDER — ATORVASTATIN CALCIUM 10 MG/1
10 TABLET, FILM COATED ORAL DAILY
Qty: 90 TABLET | Refills: 1 | Status: SHIPPED | OUTPATIENT
Start: 2019-08-23 | End: 2020-03-16

## 2019-08-23 RX ORDER — CLONAZEPAM 0.5 MG/1
0.5 TABLET ORAL DAILY PRN
Qty: 90 TABLET | Refills: 0 | Status: SHIPPED | OUTPATIENT
Start: 2019-08-23 | End: 2022-02-15 | Stop reason: SDUPTHER

## 2019-08-23 RX ORDER — LOSARTAN POTASSIUM 25 MG/1
25 TABLET ORAL DAILY
Qty: 90 TABLET | Refills: 1 | Status: SHIPPED | OUTPATIENT
Start: 2019-08-23 | End: 2020-06-08

## 2019-08-23 NOTE — PATIENT INSTRUCTIONS
MyPlate from USDA    MyPlate is an outline of a general healthy diet based on the 2010 Dietary Guidelines for Americans, from the U.S. Department of Agriculture (USDA). It sets guidelines for how much food you should eat from each food group based on your age, sex, and level of physical activity.  What are tips for following MyPlate?  To follow MyPlate recommendations:  · Eat a wide variety of fruits and vegetables, grains, and protein foods.  · Serve smaller portions and eat less food throughout the day.  · Limit portion sizes to avoid overeating.  · Enjoy your food.  · Get at least 150 minutes of exercise every week. This is about 30 minutes each day, 5 or more days per week.  It can be difficult to have every meal look like MyPlate. Think about MyPlate as eating guidelines for an entire day, rather than each individual meal.  Fruits and vegetables  · Make half of your plate fruits and vegetables.  · Eat many different colors of fruits and vegetables each day.  · For a 2,000 calorie daily food plan, eat:  ? 2½ cups of vegetables every day.  ? 2 cups of fruit every day.  · 1 cup is equal to:  ? 1 cup raw or cooked vegetables.  ? 1 cup raw fruit.  ? 1 medium-sized orange, apple, or banana.  ? 1 cup 100% fruit or vegetable juice.  ? 2 cups raw leafy greens, such as lettuce, spinach, or kale.  ? ½ cup dried fruit.  Grains  · One fourth of your plate should be grains.  · Make at least half of the grains you eat each day whole grains.  · For a 2,000 calorie daily food plan, eat 6 oz of grains every day.  · 1 oz is equal to:  ? 1 slice bread.  ? 1 cup cereal.  ? ½ cup cooked rice, cereal, or pasta.  Protein  · One fourth of your plate should be protein.  · Eat a wide variety of protein foods, including meat, poultry, fish, eggs, beans, nuts, and tofu.  · For a 2,000 calorie daily food plan, eat 5½ oz of protein every day.  · 1 oz is equal to:  ? 1 oz meat, poultry, or fish.  ? ¼ cup cooked beans.  ? 1 egg.  ? ½ oz  nuts or seeds.  ? 1 Tbsp peanut butter.  Dairy  · Drink fat-free or low-fat (1%) milk.  · Eat or drink dairy as a side to meals.  · For a 2,000 calorie daily food plan, eat or drink 3 cups of dairy every day.  · 1 cup is equal to:  ? 1 cup milk, yogurt, cottage cheese, or soy milk (soy beverage).  ? 2 oz processed cheese.  ? 1½ oz natural cheese.  Fats, oils, salt, and sugars  · Only small amounts of oils are recommended.  · Avoid foods that are high in calories and low in nutritional value (empty calories), like foods high in fat or added sugars.  · Choose foods that are low in salt (sodium). Choose foods that have less than 140 milligrams (mg) of sodium per serving.  · Drink water instead of sugary drinks. Drink enough water each day to keep your urine pale yellow.  Where to find support  · Work with your health care provider or a nutrition specialist (dietitian) to develop a customized eating plan that is right for you.  · Download an robert (mobile application) to help you track your daily food intake.  Where to find more information  · Go to ChooseMyPlate.gov for more information.  · Learn more and log your daily food intake according to MyPlate using USDA's NN LAB[employer]cker: www.Twenty Jeanscker.usda.gov  Summary  · MyPlate is a general guideline for healthy eating from the USDA. It is based on the 2010 Dietary Guidelines for Americans.  · In general, fruits and vegetables should take up ½ of your plate, grains should take up ¼ of your plate, and protein should take up ¼ of your plate.  This information is not intended to replace advice given to you by your health care provider. Make sure you discuss any questions you have with your health care provider.  Document Released: 01/06/2009 Document Revised: 03/19/2018 Document Reviewed: 03/19/2018  Else[employer] Interactive Patient Education © 2019 [employer].

## 2019-08-23 NOTE — PROGRESS NOTES
Subjective   Paul Sterling is a 69 y.o. male who comes in today for   Chief Complaint   Patient presents with   • Annual Exam     Pt had labs   .    History of Present Illness   Here for CPE.  Two concerns.  Having some dizziness at times.  Wondering if it is losartan and wondering if if is postural b/c leaning over and standing up he gets a dizziness and feeling of might go down for about 10-15 seconds.  Things feel a little fuzzy. No syncope.  15 years ago he had syncopal episode after lying on couch and went to restroom and then passed out.  BP usually high 120's and DBP 80-90. His readings at home are varied.  Never low at home.  And never really high.  No swelling in ankles.  No cp.  No soa and no hercules.  Plays a lot of  Golf and hydrates very well.  No ha's and vision has been normal.  Wanting to see a different ENT/ for another opinion on h/o vocal cord polyp and also his intermittent vertigo that occurs once every few months and last about 6-12 hours.  No associated vomiting.  Starts when rolls over in bed and will last that day. No associated ha  cscope and EGD due in 2021 with [facility].  Has h/o barretts.  Takes otc nexium daily 20 mg qd and sometimes 2/day.   Currently not taking myrbetriq.  It helps a little.  In the summer he doesn't need it as much as during the winter months.    Takes clonopin prn for essential tremor which helps him do fine motor movements.     The following portions of the patient's history were reviewed and updated as appropriate: allergies, current medications, past family history, past medical history, past social history, past surgical history and problem list.    Review of Systems   Constitutional: Negative.    Respiratory: Negative.    Cardiovascular: Negative.    Gastrointestinal: Negative.         Indigestion     Genitourinary: Positive for frequency (controlled by the myrbetriq).   Neurological: Positive for dizziness and light-headedness.   Psychiatric/Behavioral:  Negative.        Vitals:    08/23/19 0820   BP: 110/58   Pulse: 69   Temp: 98.1 °F (36.7 °C)   SpO2: 98%       Objective   Physical Exam   Constitutional: He is oriented to person, place, and time. He appears well-developed and well-nourished.   HENT:   Head: Normocephalic and atraumatic.   Right Ear: External ear normal.   Left Ear: External ear normal.   Mouth/Throat: Oropharynx is clear and moist.   Eyes: Conjunctivae are normal.   Neck: Neck supple.   Cardiovascular: Normal rate, regular rhythm, normal heart sounds and intact distal pulses.   No bruits   Pulmonary/Chest: Effort normal and breath sounds normal. No respiratory distress. He has no wheezes. He has no rales.   Abdominal: Soft. Bowel sounds are normal. He exhibits no distension and no mass. There is no tenderness.   Lymphadenopathy:     He has no cervical adenopathy.   Neurological: He is alert and oriented to person, place, and time.   Skin: Skin is warm.   Psychiatric: He has a normal mood and affect. His behavior is normal. Judgment and thought content normal.   Nursing note and vitals reviewed.        Current Outpatient Medications:   •  atorvastatin (LIPITOR) 10 MG tablet, Take 1 tablet by mouth Daily., Disp: 90 tablet, Rfl: 1  •  clonazePAM (KlonoPIN) 0.5 MG tablet, Take 1 tablet by mouth Daily As Needed (HAND TREMORS)., Disp: 90 tablet, Rfl: 0  •  Glucosamine-Chondroit-Vit C-Mn (GLUCOSAMINE CHONDR 1500 COMPLX PO), Take 1 tablet by mouth Daily., Disp: , Rfl:   •  hydrocortisone 1 % cream, Apply  topically., Disp: , Rfl:   •  hydrocortisone 2.5 % cream, APPLY TO AFFECTED AREA OF UNDERARMS TWICE DAILY FOR ITCHING, Disp: , Rfl: 3  •  loratadine (CLARITIN) 10 MG tablet, Take 10 mg by mouth Daily., Disp: , Rfl:   •  losartan (COZAAR) 25 MG tablet, Take 1 tablet by mouth Daily., Disp: 90 tablet, Rfl: 1  •  MYRBETRIQ 50 MG tablet sustained-release 24 hour 24 hr tablet, TAKE 1 TABLET BY MOUTH DAILY ., Disp: 90 tablet, Rfl: 1  •  triamcinolone (NASACORT  AQ) 55 MCG/ACT nasal inhaler, 4 Squirts into each nostril daily., Disp: , Rfl:     Assessment/Plan   Paul was seen today for annual exam.    Diagnoses and all orders for this visit:    Hyperlipidemia, unspecified hyperlipidemia type    Tremor of both hands    Well adult exam    History of Noonan's esophagus    Vertigo  -     Ambulatory Referral to ENT (Otolaryngology)    History of vocal cord polypectomy    Vocal cord polyp  -     Ambulatory Referral to ENT (Otolaryngology)    Need for pneumococcal vaccine  -     Pneumococcal polysaccharide vaccine 23-valent >= 3yo subcutaneous/IM (PPSV23)    Other orders  -     losartan (COZAAR) 25 MG tablet; Take 1 tablet by mouth Daily.  -     atorvastatin (LIPITOR) 10 MG tablet; Take 1 tablet by mouth Daily.  -     clonazePAM (KlonoPIN) 0.5 MG tablet; Take 1 tablet by mouth Daily As Needed (HAND TREMORS).    refills provided.    He will start his losartan at  Night and see if his dizziness improves and if that doesn't help, he will take 1/2 of tab watching his BP closely  RF clonazepam--contract updated --for tremors and prn use.  Last refill over 1 year ago  RF lipitor --HL well controlled  Pneumococcal 23 today  Refer to ENT for f/u on h/o vocal cord polyp and work up on vertigo  Vascular screening discussed and rec to assess carotids and aorta and PAD>  Think dizziness is related to orthostasis.   Encouraged avid water/hydration and following bp as he may not need the 25 mg losartan.    He isn't due to cscope or EGD yet.  Dr. Gordon follows.                   I have asked for the patient to return to clinic in 12month(s).

## 2019-08-26 ENCOUNTER — RESULTS ENCOUNTER (OUTPATIENT)
Dept: FAMILY MEDICINE CLINIC | Facility: CLINIC | Age: 69
End: 2019-08-26

## 2019-08-26 DIAGNOSIS — Z12.5 SCREENING FOR PROSTATE CANCER: ICD-10-CM

## 2019-12-18 ENCOUNTER — TELEPHONE (OUTPATIENT)
Dept: CASE MANAGEMENT | Facility: OTHER | Age: 69
End: 2019-12-18

## 2020-03-06 ENCOUNTER — TELEPHONE (OUTPATIENT)
Dept: FAMILY MEDICINE CLINIC | Facility: CLINIC | Age: 70
End: 2020-03-06

## 2020-03-06 DIAGNOSIS — M54.50 LOW BACK PAIN, UNSPECIFIED BACK PAIN LATERALITY, UNSPECIFIED CHRONICITY, UNSPECIFIED WHETHER SCIATICA PRESENT: Primary | ICD-10-CM

## 2020-03-06 NOTE — TELEPHONE ENCOUNTER
WANTS A REFERRAL TO PT FOR HIS LOWER BACK PAIN. HE WANTS TO GO  TO [facility]  [address]  PHONE [phone]  SAID HE HAS BEEN THERE BEFORE FOR THIS ONGOING PROBLEM

## 2020-03-16 RX ORDER — ATORVASTATIN CALCIUM 10 MG/1
TABLET, FILM COATED ORAL
Qty: 90 TABLET | Refills: 1 | Status: SHIPPED | OUTPATIENT
Start: 2020-03-16 | End: 2020-10-20 | Stop reason: SDUPTHER

## 2020-06-08 RX ORDER — LOSARTAN POTASSIUM 25 MG/1
TABLET ORAL
Qty: 90 TABLET | Refills: 1 | Status: SHIPPED | OUTPATIENT
Start: 2020-06-08 | End: 2020-12-21

## 2020-08-31 RX ORDER — CLONAZEPAM 0.5 MG/1
TABLET ORAL
Qty: 90 TABLET | OUTPATIENT
Start: 2020-08-31

## 2020-09-03 NOTE — TELEPHONE ENCOUNTER
Caller: Paul Sterling    Relationship: Self    Best call back number: [phone]     Medication needed:   Requested Prescriptions     Pending Prescriptions Disp Refills   • Mirabegron ER (Myrbetriq) 50 MG tablet sustained-release 24 hour 24 hr tablet 90 tablet 1     Sig: Take 50 mg by mouth Daily.       When do you need the refill by: 09/04/2020    What details did the patient provide when requesting the medication: HAS A WEEK LEFT BUT STATES PHARMACY HAS BEEN TRYING TO REFILL AND CAN NOT GET RESPONSE FROM DOCTOR    Does the patient have less than a 3 day supply:  [] Yes  [x] No    What is the patient's preferred pharmacy:    PATIENT GETS THIS FILLED FROM [facility] IN [place] CAN YOU FAX SCRIPT TO THE FOLLOWING     FAX NUMBER.  [phone]

## 2020-10-21 RX ORDER — ATORVASTATIN CALCIUM 10 MG/1
10 TABLET, FILM COATED ORAL DAILY
Qty: 90 TABLET | Refills: 0 | Status: SHIPPED | OUTPATIENT
Start: 2020-10-21 | End: 2021-01-20

## 2020-10-21 RX ORDER — ATORVASTATIN CALCIUM 10 MG/1
TABLET, FILM COATED ORAL
Qty: 90 TABLET | Refills: 1 | OUTPATIENT
Start: 2020-10-21

## 2020-12-14 ENCOUNTER — OFFICE VISIT (OUTPATIENT)
Dept: SURGERY | Facility: CLINIC | Age: 70
End: 2020-12-14

## 2020-12-14 VITALS — HEIGHT: 69 IN | BODY MASS INDEX: 28.58 KG/M2 | WEIGHT: 193 LBS

## 2020-12-14 DIAGNOSIS — R10.33 PERIUMBILICAL PAIN: Primary | ICD-10-CM

## 2020-12-14 PROCEDURE — 99213 OFFICE O/P EST LOW 20 MIN: CPT | Performed by: SURGERY

## 2020-12-14 RX ORDER — LANSOPRAZOLE 15 MG/1
30 CAPSULE, DELAYED RELEASE ORAL DAILY
COMMUNITY
End: 2021-02-09 | Stop reason: ALTCHOICE

## 2020-12-14 RX ORDER — HYDROXYZINE HYDROCHLORIDE 25 MG/1
25 TABLET, FILM COATED ORAL 3 TIMES DAILY PRN
COMMUNITY
End: 2022-02-15

## 2020-12-14 NOTE — PROGRESS NOTES
"SUMMARY (A/P):    70-year-old gentleman with history that would suggest possible cellulitis around his prior umbilical hernia repair (performed in 2010).  Given that all symptoms and erythema have resolved without specific intervention, I do not feel any further work-up or treatment is warranted at this time.  He is encouraged to return if symptoms recur.      CC:    Periumbilical pain    HPI:    70-year-old gentleman presents with mild periumbilical tenderness associated with \"bump\" and redness that started several weeks ago.  All of his symptoms have all but resolved at this point without any specific intervention.    PHYSICAL EXAM:   • Constitutional: Well-developed well-nourished, no acute distress  • Vital signs: Weight 193 pounds, height 69 inches, BMI 28.5  • Eyes: Conjunctiva normal, sclera nonicteric  • ENMT: Hearing grossly normal, oral mucosa moist  • Respiratory: Normal inspiratory effort  • Cardiovascular: Regular rate  • Gastrointestinal: Soft, nontender, no evidence of recurrent umbilical hernia, firm nodularity around the umbilicus consistent with scarring from prior surgery, no erythema  • Skin:  Warm, dry, no rash on visualized skin surfaces  • Musculoskeletal: Symmetric strength, normal gait  • Psychiatric: Alert and oriented ×3, normal affect     ALLERGIES:   • None    MEDICATIONS:   • Reviewed, in epic    PMH:    • Arthritis  • Noonan's esophagus  • Gastroesophageal reflux disease  • History of adenomatous polyps  • Hypertension  • Hyperlipidemia    PSH:    • EGD and colonoscopy 6/20/2018 Dr. Hernandez  • Laparoscopic cholecystectomy 2010  • Bilateral inguinal hernia repair  • Laparoscopic Nissen fundoplication    FAMILY HISTORY:    • Mother with colon cancer    SOCIAL HISTORY:   • Denies tobacco use  • Occasional alcohol use    ROS:    Influenza Like Illness: no fever, no  cough, no  sore throat, no  body aches, no loss of sense of taste or smell, no known exposure to person with Covid-19.  All " other systems reviewed and negative other than presenting complaints.    ANASTASIYA BURCH M.D.

## 2020-12-22 RX ORDER — LOSARTAN POTASSIUM 25 MG/1
TABLET ORAL
Qty: 90 TABLET | Refills: 0 | Status: SHIPPED | OUTPATIENT
Start: 2020-12-22 | End: 2021-04-07

## 2020-12-28 ENCOUNTER — TELEPHONE (OUTPATIENT)
Dept: FAMILY MEDICINE CLINIC | Facility: CLINIC | Age: 70
End: 2020-12-28

## 2020-12-28 NOTE — TELEPHONE ENCOUNTER
Patient called regarding myrbetriq 50mg refill    He says his pharmacy sent a request for refill last week    Patient stated pharmacy is [facility], but he did not have address or fax #    Patient stated he would  written script if easier.    Did not pend in case re-order already requested via pharmacy    [phone]

## 2020-12-28 NOTE — TELEPHONE ENCOUNTER
[facility] [phone]. Called to get fax number, they are faxing over another request as they stated it was faxed on 12/15/20

## 2020-12-29 NOTE — TELEPHONE ENCOUNTER
Received fax that is the only way to send it to them. Awaiting  to come back into office to sign.

## 2021-01-07 DIAGNOSIS — Z00.00 WELL ADULT EXAM: Primary | ICD-10-CM

## 2021-01-07 DIAGNOSIS — R03.0 ELEVATED BLOOD-PRESSURE READING WITHOUT DIAGNOSIS OF HYPERTENSION: ICD-10-CM

## 2021-01-07 DIAGNOSIS — E78.5 HYPERLIPIDEMIA, UNSPECIFIED HYPERLIPIDEMIA TYPE: ICD-10-CM

## 2021-01-07 DIAGNOSIS — R94.6 ABNORMAL RESULTS OF THYROID FUNCTION STUDIES: ICD-10-CM

## 2021-01-08 DIAGNOSIS — Z00.00 WELL ADULT EXAM: ICD-10-CM

## 2021-01-08 DIAGNOSIS — R94.6 ABNORMAL RESULTS OF THYROID FUNCTION STUDIES: ICD-10-CM

## 2021-01-08 DIAGNOSIS — E78.5 HYPERLIPIDEMIA, UNSPECIFIED HYPERLIPIDEMIA TYPE: ICD-10-CM

## 2021-01-08 DIAGNOSIS — R03.0 ELEVATED BLOOD-PRESSURE READING WITHOUT DIAGNOSIS OF HYPERTENSION: ICD-10-CM

## 2021-01-09 LAB
ALBUMIN SERPL-MCNC: 4.6 G/DL (ref 3.5–5.2)
ALBUMIN/GLOB SERPL: 2.6 G/DL
ALP SERPL-CCNC: 65 U/L (ref 39–117)
ALT SERPL-CCNC: 35 U/L (ref 1–41)
APPEARANCE UR: CLEAR
AST SERPL-CCNC: 26 U/L (ref 1–40)
BACTERIA #/AREA URNS HPF: NORMAL /HPF
BASOPHILS # BLD AUTO: NORMAL 10*3/UL
BILIRUB SERPL-MCNC: 0.6 MG/DL (ref 0–1.2)
BILIRUB UR QL STRIP: NEGATIVE
BUN SERPL-MCNC: 10 MG/DL (ref 8–23)
BUN/CREAT SERPL: 10.8 (ref 7–25)
CALCIUM SERPL-MCNC: 9.7 MG/DL (ref 8.6–10.5)
CHLORIDE SERPL-SCNC: 103 MMOL/L (ref 98–107)
CHOLEST SERPL-MCNC: 141 MG/DL (ref 0–200)
CO2 SERPL-SCNC: 25.6 MMOL/L (ref 22–29)
COLOR UR: YELLOW
CREAT SERPL-MCNC: 0.93 MG/DL (ref 0.76–1.27)
DIFFERENTIAL COMMENT: ABNORMAL
EOSINOPHIL # BLD AUTO: NORMAL 10*3/UL
EOSINOPHIL # BLD MANUAL: 0.05 10*3/MM3 (ref 0–0.4)
EOSINOPHIL NFR BLD AUTO: NORMAL %
EOSINOPHIL NFR BLD MANUAL: 1 % (ref 0.3–6.2)
EPI CELLS #/AREA URNS HPF: NORMAL /HPF (ref 0–10)
ERYTHROCYTE [DISTWIDTH] IN BLOOD BY AUTOMATED COUNT: 13.5 % (ref 12.3–15.4)
GLOBULIN SER CALC-MCNC: 1.8 GM/DL
GLUCOSE SERPL-MCNC: 97 MG/DL (ref 65–99)
GLUCOSE UR QL: NEGATIVE
HCT VFR BLD AUTO: 48.3 % (ref 37.5–51)
HDLC SERPL-MCNC: 58 MG/DL (ref 40–60)
HGB BLD-MCNC: 16.5 G/DL (ref 13–17.7)
HGB UR QL STRIP: NEGATIVE
KETONES UR QL STRIP: NEGATIVE
LDLC SERPL CALC-MCNC: 72 MG/DL (ref 0–100)
LDLC/HDLC SERPL: 1.27 {RATIO}
LEUKOCYTE ESTERASE UR QL STRIP: NEGATIVE
LYMPHOCYTES # BLD AUTO: NORMAL 10*3/UL
LYMPHOCYTES # BLD MANUAL: 0.88 10*3/MM3 (ref 0.7–3.1)
LYMPHOCYTES NFR BLD AUTO: NORMAL %
LYMPHOCYTES NFR BLD MANUAL: 17.3 % (ref 19.6–45.3)
MCH RBC QN AUTO: 30.3 PG (ref 26.6–33)
MCHC RBC AUTO-ENTMCNC: 34.2 G/DL (ref 31.5–35.7)
MCV RBC AUTO: 88.6 FL (ref 79–97)
MICRO URNS: NORMAL
MICRO URNS: NORMAL
MONOCYTES # BLD MANUAL: 0.31 10*3/MM3 (ref 0.1–0.9)
MONOCYTES NFR BLD AUTO: NORMAL %
MONOCYTES NFR BLD MANUAL: 6.1 % (ref 5–12)
MUCOUS THREADS URNS QL MICRO: PRESENT /HPF
NEUTROPHILS # BLD MANUAL: 3.84 10*3/MM3 (ref 1.7–7)
NEUTROPHILS NFR BLD AUTO: NORMAL %
NEUTROPHILS NFR BLD MANUAL: 75.5 % (ref 42.7–76)
NITRITE UR QL STRIP: NEGATIVE
PH UR STRIP: 6.5 [PH] (ref 5–7.5)
PLATELET # BLD AUTO: 179 10*3/MM3 (ref 140–450)
PLATELET BLD QL SMEAR: ABNORMAL
POTASSIUM SERPL-SCNC: 4.9 MMOL/L (ref 3.5–5.2)
PROT SERPL-MCNC: 6.4 G/DL (ref 6–8.5)
PROT UR QL STRIP: NEGATIVE
RBC # BLD AUTO: 5.45 10*6/MM3 (ref 4.14–5.8)
RBC #/AREA URNS HPF: NORMAL /HPF (ref 0–2)
RBC MORPH BLD: ABNORMAL
SODIUM SERPL-SCNC: 138 MMOL/L (ref 136–145)
SP GR UR: 1.01 (ref 1–1.03)
T4 FREE SERPL-MCNC: 1.08 NG/DL (ref 0.93–1.7)
TRIGL SERPL-MCNC: 47 MG/DL (ref 0–150)
TSH SERPL DL<=0.005 MIU/L-ACNC: 3.04 UIU/ML (ref 0.27–4.2)
URINALYSIS REFLEX: NORMAL
UROBILINOGEN UR STRIP-MCNC: 0.2 MG/DL (ref 0.2–1)
VLDLC SERPL CALC-MCNC: 11 MG/DL (ref 5–40)
WBC # BLD AUTO: 5.08 10*3/MM3 (ref 3.4–10.8)
WBC #/AREA URNS HPF: NORMAL /HPF (ref 0–5)

## 2021-01-20 RX ORDER — ATORVASTATIN CALCIUM 10 MG/1
TABLET, FILM COATED ORAL
Qty: 90 TABLET | Refills: 1 | Status: SHIPPED | OUTPATIENT
Start: 2021-01-20 | End: 2021-08-31

## 2021-02-09 ENCOUNTER — OFFICE VISIT (OUTPATIENT)
Dept: FAMILY MEDICINE CLINIC | Facility: CLINIC | Age: 71
End: 2021-02-09

## 2021-02-09 VITALS
RESPIRATION RATE: 16 BRPM | DIASTOLIC BLOOD PRESSURE: 64 MMHG | WEIGHT: 198.2 LBS | OXYGEN SATURATION: 98 % | SYSTOLIC BLOOD PRESSURE: 108 MMHG | HEIGHT: 69 IN | HEART RATE: 77 BPM | TEMPERATURE: 97.7 F | BODY MASS INDEX: 29.36 KG/M2

## 2021-02-09 DIAGNOSIS — Z12.11 COLON CANCER SCREENING: ICD-10-CM

## 2021-02-09 DIAGNOSIS — R39.15 URINARY URGENCY: ICD-10-CM

## 2021-02-09 DIAGNOSIS — Z12.5 PROSTATE CANCER SCREENING: Primary | ICD-10-CM

## 2021-02-09 DIAGNOSIS — G89.29 CHRONIC LEFT-SIDED LOW BACK PAIN WITH LEFT-SIDED SCIATICA: ICD-10-CM

## 2021-02-09 DIAGNOSIS — M54.42 CHRONIC LEFT-SIDED LOW BACK PAIN WITH LEFT-SIDED SCIATICA: ICD-10-CM

## 2021-02-09 DIAGNOSIS — R35.0 URINARY FREQUENCY: ICD-10-CM

## 2021-02-09 DIAGNOSIS — Z00.00 ENCOUNTER FOR SUBSEQUENT ANNUAL WELLNESS VISIT (AWV) IN MEDICARE PATIENT: ICD-10-CM

## 2021-02-09 DIAGNOSIS — K21.9 GASTROESOPHAGEAL REFLUX DISEASE, UNSPECIFIED WHETHER ESOPHAGITIS PRESENT: ICD-10-CM

## 2021-02-09 LAB — PSA SERPL-MCNC: 0.48 NG/ML (ref 0–4)

## 2021-02-09 PROCEDURE — 99213 OFFICE O/P EST LOW 20 MIN: CPT | Performed by: INTERNAL MEDICINE

## 2021-02-09 PROCEDURE — G0439 PPPS, SUBSEQ VISIT: HCPCS | Performed by: INTERNAL MEDICINE

## 2021-02-09 RX ORDER — PANTOPRAZOLE SODIUM 40 MG/1
40 TABLET, DELAYED RELEASE ORAL DAILY
Qty: 90 TABLET | Refills: 1 | Status: SHIPPED | OUTPATIENT
Start: 2021-02-09 | End: 2021-07-29

## 2021-02-09 NOTE — PROGRESS NOTES
"Chief Complaint  Annual Exam ( annual wellness visit ), Back Pain (pt states has been coming and going several years and pt states recently pretty severe), and Heartburn (pt states cough and it's because of reflux it's causing him to cough )    Subjective          Paul Headleycasperserenecoretta presents to [facility]  History of Present Illness  Here for AWV  And  C/o worsening GERD while taking prevacid 30 mg qd,  Worsening urinary urgency and frequency too--on myrbetriq 50 mg qd which has been helpful in the past.  Feels like it is worsening.    C/o worsening LBP on left side and occasionally radiates to left leg but rarely.  Back pain has been long standing and usually resolves with stretches and walking.  Lately the sharpness of pain has worsened.  Does PT exercises for 30 min daily prior to activities.  Helps somewhat.  Has been bad for 3 weeks but today he thinks it may be improving.  He is using heat and ice--heat helps.  Not taking oral medications yet b/c he was getting the covid vaccinations.         Objective   Vital Signs:   /64   Pulse 77   Temp 97.7 °F (36.5 °C) (Temporal)   Resp 16   Ht 175.3 cm (69\")   Wt 89.9 kg (198 lb 3.2 oz)   SpO2 98%   BMI 29.27 kg/m²     Physical Exam  Vitals signs and nursing note reviewed.   Constitutional:       Appearance: Normal appearance. He is well-developed.   HENT:      Head: Normocephalic and atraumatic.      Right Ear: External ear normal.      Left Ear: External ear normal.   Eyes:      Extraocular Movements: Extraocular movements intact.      Conjunctiva/sclera: Conjunctivae normal.   Neck:      Musculoskeletal: Neck supple.      Vascular: No carotid bruit.   Cardiovascular:      Rate and Rhythm: Normal rate and regular rhythm.      Heart sounds: Normal heart sounds.      Comments: No bruits  Pulmonary:      Effort: Pulmonary effort is normal. No respiratory distress.      Breath sounds: Normal breath sounds. No wheezing or rales. "   Abdominal:      General: Bowel sounds are normal. There is no distension.      Palpations: Abdomen is soft. There is no mass.      Tenderness: There is no abdominal tenderness.   Musculoskeletal:      Comments: Neg SLR bilaterally   Lymphadenopathy:      Cervical: No cervical adenopathy.   Skin:     General: Skin is warm.   Neurological:      General: No focal deficit present.      Mental Status: He is alert and oriented to person, place, and time.   Psychiatric:         Mood and Affect: Mood normal.         Behavior: Behavior normal.         Thought Content: Thought content normal.         Judgment: Judgment normal.        Result Review :                TSH (01/08/2021 09:42)  T4, Free (01/08/2021 09:42)  Lipid Panel With LDL / HDL Ratio (01/08/2021 09:42)  Comprehensive Metabolic Panel (01/08/2021 09:42)  CBC & Differential (01/08/2021 09:42)  Urinalysis With Culture If Indicated - (01/08/2021 09:42)  Microscopic Examination - (01/08/2021 09:42)  Manual Differential (01/08/2021 09:42)    Assessment and Plan    Diagnoses and all orders for this visit:    1. Prostate cancer screening (Primary)  Comments:  check PSA today  Orders:  -     PSA Screen    2. Colon cancer screening  -     Ambulatory Referral to Gastroenterology    3. Gastroesophageal reflux disease, unspecified whether esophagitis present  Comments:  stop prevacid and start protonix.  refer to Dr. Omalley for EGD and CN (he is due for CRC screening)  Orders:  -     Ambulatory Referral to Gastroenterology    4. Urinary urgency  Comments:  continue myrbetriq. check PSA.  refer to Dr. Graham Holbrook  Orders:  -     Ambulatory Referral to Urology    5. Urinary frequency  -     Ambulatory Referral to Urology    6. Chronic left-sided low back pain with left-sided sciatica  Comments:  Get MRI. continue exercises.   consider epidurals if there is indication on MRI  Orders:  -     MRI Lumbar Spine Without Contrast; Future    7. Encounter for subsequent annual  wellness visit (AWV) in Medicare patient    Other orders  -     pantoprazole (PROTONIX) 40 MG EC tablet; Take 1 tablet by mouth Daily.  Dispense: 90 tablet; Refill: 1        Follow Up   No follow-ups on file.  Patient was given instructions and counseling regarding his condition or for health maintenance advice. Please see specific information pulled into the AVS if appropriate.     Check PSA--refer to Dr. Graham Holbrook for further eval of urinary sx's.  ? BPH or OAB?

## 2021-02-09 NOTE — PROGRESS NOTES
The ABCs of the Annual Wellness Visit  Subsequent Medicare Wellness Visit    Chief Complaint   Patient presents with   • Annual Exam      annual wellness visit    • Back Pain     pt states has been coming and going several years and pt states recently pretty severe   • Heartburn     pt states cough and it's because of reflux it's causing him to cough        Subjective   History of Present Illness:  Paul Sterling is a 70 y.o. male who presents for a Subsequent Medicare Wellness Visit.    HEALTH RISK ASSESSMENT    Recent Hospitalizations:  No hospitalization(s) within the last year.    Current Medical Providers:  Patient Care Team:  Ofelia Bradford MD as PCP - General (Internal Medicine)    Smoking Status:  Social History     Tobacco Use   Smoking Status Never Smoker   Smokeless Tobacco Never Used       Alcohol Consumption:  Social History     Substance and Sexual Activity   Alcohol Use Yes   • Alcohol/week: 21.0 - 24.0 standard drinks   • Types: 21 - 24 Cans of beer per week       Depression Screen:   PHQ-2/PHQ-9 Depression Screening 2/9/2021   Little interest or pleasure in doing things 0   Feeling down, depressed, or hopeless 0   Total Score 0       Fall Risk Screen:  [facility] Fall Risk Assessment was completed, and patient is at LOW risk for falls.Assessment completed on:2/9/2021    Health Habits and Functional and Cognitive Screening:  Functional & Cognitive Status 2/9/2021   Do you have difficulty preparing food and eating? No   Do you have difficulty bathing yourself, getting dressed or grooming yourself? No   Do you have difficulty using the toilet? No   Do you have difficulty moving around from place to place? No   Do you have trouble with steps or getting out of a bed or a chair? No   Current Diet Unhealthy Diet   Dental Exam Up to date   Eye Exam Up to date   Exercise (times per week) 7 times per week   Current Exercise Activities Include Walking   Do you need help using the phone?  No   Are you deaf  or do you have serious difficulty hearing?  Yes   Do you need help with transportation? No   Do you need help shopping? No   Do you need help preparing meals?  No   Do you need help with housework?  No   Do you need help with laundry? No   Do you need help taking your medications? No   Do you need help managing money? No   Do you ever drive or ride in a car without wearing a seat belt? No   Have you felt unusual stress, anger or loneliness in the last month? No   Who do you live with? Spouse   If you need help, do you have trouble finding someone available to you? No   Have you been bothered in the last four weeks by sexual problems? No         Does the patient have evidence of cognitive impairment? No    Asprin use counseling:Does not need ASA (and currently is not on it)    Age-appropriate Screening Schedule:  Refer to the list below for future screening recommendations based on patient's age, sex and/or medical conditions. Orders for these recommended tests are listed in the plan section. The patient has been provided with a written plan.    Health Maintenance   Topic Date Due   • TDAP/TD VACCINES (1 - Tdap) [birth year]/1970   • ZOSTER VACCINE (2 of 2) 02/18/2022 (Originally 7/27/2012)   • LIPID PANEL  01/08/2022   • COLONOSCOPY  06/20/2023   • INFLUENZA VACCINE  Completed          The following portions of the patient's history were reviewed and updated as appropriate: allergies, current medications, past family history, past medical history, past social history, past surgical history and problem list.    Outpatient Medications Prior to Visit   Medication Sig Dispense Refill   • atorvastatin (LIPITOR) 10 MG tablet TAKE 1 TABLET BY MOUTH EVERY DAY 90 tablet 1   • clonazePAM (KlonoPIN) 0.5 MG tablet Take 1 tablet by mouth Daily As Needed (HAND TREMORS). 90 tablet 0   • Glucosamine-Chondroit-Vit C-Mn (GLUCOSAMINE CHONDR 1500 COMPLX PO) Take 1 tablet by mouth Daily.     • hydrocortisone 1 % cream Apply 1 application  topically to the appropriate area as directed As Needed.     • hydrOXYzine (ATARAX) 25 MG tablet Take 25 mg by mouth 3 (Three) Times a Day As Needed for Itching.     • lansoprazole (PREVACID) 15 MG capsule Take 30 mg by mouth Daily.     • Loratadine (CLARITIN PO) Take 30 mg by mouth Daily.     • losartan (COZAAR) 25 MG tablet TAKE 1 TABLET BY MOUTH EVERY DAY 90 tablet 0   • Mirabegron ER (Myrbetriq) 50 MG tablet sustained-release 24 hour 24 hr tablet Take 50 mg by mouth Daily. 90 tablet 1   • Multiple Vitamins-Minerals (PRESERVISION AREDS 2 PO) Take 2 capsules by mouth Daily.     • psyllium (METAMUCIL) 58.6 % packet Take 1 packet by mouth Daily.     • triamcinolone (KENALOG) 0.1 % ointment Apply 1 application topically to the appropriate area as directed As Needed.     • triamcinolone (NASACORT AQ) 55 MCG/ACT nasal inhaler 4 Squirts into each nostril daily.     • hydrocortisone 2.5 % cream Apply 1 application topically to the appropriate area as directed As Needed.  3     No facility-administered medications prior to visit.        Patient Active Problem List   Diagnosis   • Well adult exam   • Rash   • Tremor of both hands   • Insomnia   • Encounter for immunization   • Hyperlipidemia   • Elevated blood-pressure reading without diagnosis of hypertension   • Rectal bleeding   • Family history of colon cancer   • Family history of colonic polyps   • Colon cancer screening   • History of Noonan's esophagus   • Gastroesophageal reflux disease       Advanced Care Planning:  ACP discussion was held with the patient during this visit. Patient does not have an advance directive, information provided.    Review of Systems    Compared to one year ago, the patient feels his physical health is worse.  Compared to one year ago, the patient feels his mental health is the same.    Reviewed chart for potential of high risk medication in the elderly: yes  Reviewed chart for potential of harmful drug interactions in the  "elderly:yes    Objective         Vitals:    02/09/21 0839   BP: 108/64   Pulse: 77   Resp: 16   Temp: 97.7 °F (36.5 °C)   TempSrc: Temporal   SpO2: 98%   Height: 175.3 cm (69\")   PainSc:   2   PainLoc: Back       Body mass index is 28.5 kg/m².  Discussed the patient's BMI with him. The BMI is in the acceptable range.    Physical Exam    Lab Results   Component Value Date    GLU 97 01/08/2021    CHLPL 141 01/08/2021    TRIG 47 01/08/2021    HDL 58 01/08/2021    LDL 72 01/08/2021    VLDL 11 01/08/2021        Assessment/Plan   Medicare Risks and Personalized Health Plan  CMS Preventative Services Quick Reference  Advance Directive Discussion  Colon Cancer Screening  Dementia/Memory   Depression/Dysphoria  Diabetic Lab Screening   Fall Risk  Immunizations Discussed/Encouraged (specific immunizations; Shingrix )     The above risks/problems have been discussed with the patient.  Pertinent information has been shared with the patient in the After Visit Summary.  Follow up plans and orders are seen below in the Assessment/Plan Section.    Diagnoses and all orders for this visit:    1. Prostate cancer screening (Primary)  -     PSA Screen      Follow Up:  No follow-ups on file.     An After Visit Summary and PPPS were given to the patient.             "

## 2021-03-26 ENCOUNTER — HOSPITAL ENCOUNTER (OUTPATIENT)
Dept: MRI IMAGING | Facility: HOSPITAL | Age: 71
Discharge: HOME | End: 2021-03-26
Admitting: INTERNAL MEDICINE

## 2021-03-26 DIAGNOSIS — M54.42 CHRONIC LEFT-SIDED LOW BACK PAIN WITH LEFT-SIDED SCIATICA: ICD-10-CM

## 2021-03-26 DIAGNOSIS — G89.29 CHRONIC LEFT-SIDED LOW BACK PAIN WITH LEFT-SIDED SCIATICA: ICD-10-CM

## 2021-03-26 PROCEDURE — 72148 MRI LUMBAR SPINE W/O DYE: CPT

## 2021-03-29 DIAGNOSIS — M51.36 DEGENERATIVE DISC DISEASE, LUMBAR: Primary | ICD-10-CM

## 2021-04-08 RX ORDER — LOSARTAN POTASSIUM 25 MG/1
TABLET ORAL
Qty: 90 TABLET | Refills: 1 | Status: SHIPPED | OUTPATIENT
Start: 2021-04-08 | End: 2021-10-08

## 2021-05-19 ENCOUNTER — OFFICE VISIT (OUTPATIENT)
Dept: GASTROENTEROLOGY | Facility: CLINIC | Age: 71
End: 2021-05-19

## 2021-05-19 VITALS
HEIGHT: 69 IN | BODY MASS INDEX: 28.58 KG/M2 | SYSTOLIC BLOOD PRESSURE: 110 MMHG | WEIGHT: 193 LBS | DIASTOLIC BLOOD PRESSURE: 65 MMHG

## 2021-05-19 DIAGNOSIS — K21.9 GASTROESOPHAGEAL REFLUX DISEASE, UNSPECIFIED WHETHER ESOPHAGITIS PRESENT: ICD-10-CM

## 2021-05-19 DIAGNOSIS — Z80.0 FAMILY HISTORY OF COLON CANCER: Primary | ICD-10-CM

## 2021-05-19 DIAGNOSIS — R05.9 COUGH: ICD-10-CM

## 2021-05-19 DIAGNOSIS — K63.5 POLYP OF COLON, UNSPECIFIED PART OF COLON, UNSPECIFIED TYPE: ICD-10-CM

## 2021-05-19 DIAGNOSIS — R19.7 DIARRHEA, UNSPECIFIED TYPE: ICD-10-CM

## 2021-05-19 PROCEDURE — 99204 OFFICE O/P NEW MOD 45 MIN: CPT | Performed by: INTERNAL MEDICINE

## 2021-05-19 RX ORDER — PANTOPRAZOLE SODIUM 40 MG/1
40 TABLET, DELAYED RELEASE ORAL DAILY
Qty: 30 TABLET | Refills: 11 | Status: SHIPPED | OUTPATIENT
Start: 2021-05-19 | End: 2022-02-15

## 2021-05-19 NOTE — PROGRESS NOTES
Chief Complaint   Patient presents with   • Colon Cancer Screening   • Heartburn       History of Present Illness:   71 y.o. male who c/o heartburn despite katty fundoplication in 2000. He is on Lanzoprazole OTC 2/day. It helps. No dysphagia. Drinks 3-4 lite beers/day and 4 cups of decaffeinated coffee/day.        C/o coughing since 2000. He has seen a Pulmonologist years ago for the cough. He has also seen Dr. Moralez at [facility]. NO chest pain or SOA. No abdominal pain.        He has awakened rarely with choking on his secretions. The HOB is now elevated and that has helped.        He has a history of colon polyps.  He also has a family history of colon cancer.       He is due for an EGD and colonoscopy. He has been doing this every 3 yrs. He was originally diagnosed with Pearson's but most recenlty they say he does not have Barretts.        C/o mucous with loose stools that is better with imodium x one year. . That makes him constipated. No rectal bleeding or melena. 5-6 BM/day. Last antibiotics long time ago. No recent [place] travel.            Past Medical History:   Diagnosis Date   • Arthritis    • Pearson's esophagus    • Blood in stool    • Colon polyps    • GERD (gastroesophageal reflux disease)    • History of colonic polyps    • Hyperlipidemia    • Hypertension        Past Surgical History:   Procedure Laterality Date   • CHOLECYSTECTOMY N/A 2010    Dr. Angel Gordon, [facility]   • COLONOSCOPY N/A 6/20/2018    Procedure: COLONOSCOPY to ccecum with cold bx polypectomy;  Surgeon: Stuart Hernandez MD;  Location: [facility];  Service: Gastroenterology   • ENDOSCOPY N/A 6/20/2018    Procedure: ESOPHAGOGASTRODUODENOSCOPY with bx;  Surgeon: Stuart Hernandez MD;  Location: [facility] END[facility];  Service: Gastroenterology   • ENDOSCOPY AND COLONOSCOPY N/A 2015    Dr. Angel Gordon, [facility]   • INGUINAL HERNIA REPAIR Bilateral 2010, 2014    Dr. Angel Gordon, [facility]   • NISSEN FUNDOPLICATION LAPAROSCOPIC N/A    •  UMBILICAL HERNIA REPAIR N/A 2010    Dr. Angel Gordon, [facility]         Current Outpatient Medications:   •  atorvastatin (LIPITOR) 10 MG tablet, TAKE 1 TABLET BY MOUTH EVERY DAY, Disp: 90 tablet, Rfl: 1  •  clonazePAM (KlonoPIN) 0.5 MG tablet, Take 1 tablet by mouth Daily As Needed (HAND TREMORS)., Disp: 90 tablet, Rfl: 0  •  Glucosamine-Chondroit-Vit C-Mn (GLUCOSAMINE CHONDR 1500 COMPLX PO), Take 1 tablet by mouth Daily., Disp: , Rfl:   •  hydrocortisone 1 % cream, Apply 1 application topically to the appropriate area as directed As Needed., Disp: , Rfl:   •  hydrocortisone 2.5 % cream, Apply 1 application topically to the appropriate area as directed As Needed., Disp: , Rfl: 3  •  hydrOXYzine (ATARAX) 25 MG tablet, Take 25 mg by mouth 3 (Three) Times a Day As Needed for Itching., Disp: , Rfl:   •  Loratadine (CLARITIN PO), Take 30 mg by mouth Daily., Disp: , Rfl:   •  losartan (COZAAR) 25 MG tablet, TAKE 1 TABLET BY MOUTH EVERY DAY, Disp: 90 tablet, Rfl: 1  •  Mirabegron ER (Myrbetriq) 50 MG tablet sustained-release 24 hour 24 hr tablet, Take 50 mg by mouth Daily., Disp: 90 tablet, Rfl: 1  •  Multiple Vitamins-Minerals (PRESERVISION AREDS 2 PO), Take 2 capsules by mouth Daily., Disp: , Rfl:   •  psyllium (METAMUCIL) 58.6 % packet, Take 1 packet by mouth Daily., Disp: , Rfl:   •  triamcinolone (KENALOG) 0.1 % ointment, Apply 1 application topically to the appropriate area as directed As Needed., Disp: , Rfl:   •  triamcinolone (NASACORT AQ) 55 MCG/ACT nasal inhaler, 4 Squirts into each nostril daily., Disp: , Rfl:   •  pantoprazole (PROTONIX) 40 MG EC tablet, Take 1 tablet by mouth Daily., Disp: 90 tablet, Rfl: 1  •  pantoprazole (PROTONIX) 40 MG EC tablet, Take 1 tablet by mouth Daily., Disp: 30 tablet, Rfl: 11    No Known Allergies    Family History   Problem Relation Age of Onset   • Parkinsonism Mother    • Colon cancer Mother    • Colon polyps Mother    • Tremor Father    • Coronary artery disease Father    •  Malig Hyperthermia Neg Hx        Social History     Socioeconomic History   • Marital status:      Spouse name: Not on file   • Number of children: Not on file   • Years of education: Not on file   • Highest education level: Not on file   Tobacco Use   • Smoking status: Never Smoker   • Smokeless tobacco: Never Used   Substance and Sexual Activity   • Alcohol use: Yes     Alcohol/week: 21.0 - 24.0 standard drinks     Types: 21 - 24 Cans of beer per week   • Drug use: No   • Sexual activity: Defer       Review of Systems   Gastrointestinal: Negative for abdominal pain.     Pertinent positives and negatives documented in the HPI and all other systems reviewed and were found to be negative.  Vitals:    05/19/21 0841   BP: 110/65       Physical Exam  Vitals reviewed.   Constitutional:       General: He is not in acute distress.     Appearance: Normal appearance. He is well-developed. He is not diaphoretic.   HENT:      Head: Normocephalic and atraumatic. Hair is normal.      Right Ear: Hearing, tympanic membrane, ear canal and external ear normal.      Left Ear: Hearing, tympanic membrane, ear canal and external ear normal.      Nose: Nose normal. No nasal deformity.      Mouth/Throat:      Mouth: Mucous membranes are moist. No oral lesions.      Pharynx: Uvula midline. No uvula swelling.   Eyes:      General: Lids are normal. No scleral icterus.        Right eye: No discharge.         Left eye: No discharge.      Extraocular Movements: Extraocular movements intact.      Right eye: Normal extraocular motion and no nystagmus.      Left eye: Normal extraocular motion and no nystagmus.      Conjunctiva/sclera: Conjunctivae normal.      Pupils: Pupils are equal, round, and reactive to light.   Neck:      Thyroid: No thyromegaly.      Vascular: No JVD.   Cardiovascular:      Rate and Rhythm: Normal rate and regular rhythm.      Pulses: Normal pulses.      Heart sounds: Normal heart sounds. No murmur heard.   No  gallop.    Pulmonary:      Effort: Pulmonary effort is normal. No respiratory distress.      Breath sounds: Normal breath sounds. No wheezing or rales.   Chest:      Chest wall: No tenderness.   Abdominal:      General: Bowel sounds are normal. There is no distension.      Palpations: Abdomen is soft. There is no mass.      Tenderness: There is no abdominal tenderness. There is no guarding.      Hernia: No hernia is present.   Musculoskeletal:         General: No tenderness or deformity. Normal range of motion.      Cervical back: Normal range of motion and neck supple.   Lymphadenopathy:      Cervical: No cervical adenopathy.   Skin:     General: Skin is warm and dry.      Findings: No rash.   Neurological:      Mental Status: He is alert and oriented to person, place, and time.      Cranial Nerves: No cranial nerve deficit.      Motor: No abnormal muscle tone.      Coordination: Coordination normal.      Deep Tendon Reflexes: Reflexes are normal and symmetric. Reflexes normal.   Psychiatric:         Mood and Affect: Mood normal.         Behavior: Behavior normal.         Thought Content: Thought content normal.         Judgment: Judgment normal.         Diagnoses and all orders for this visit:    1. Family history of colon cancer (Primary)  -     Case Request; Standing  -     Case Request    2. Gastroesophageal reflux disease, unspecified whether esophagitis present  -     pantoprazole (PROTONIX) 40 MG EC tablet; Take 1 tablet by mouth Daily.  Dispense: 30 tablet; Refill: 11  -     Case Request; Standing  -     Case Request    3. Polyp of colon, unspecified part of colon, unspecified type  -     Case Request; Standing  -     Case Request    4. Cough  -     XR chest pa and lateral; Future  -     Case Request; Standing  -     Case Request    5. Diarrhea, unspecified type  -     Celiac Ab tTG DGP TIgA  -     Clostridium Difficile EIA - Stool, Per Rectum  -     Fecal Fat, Qualitative - Stool, Per Rectum  -     Fecal  Leukocytes - Stool, Per Rectum  -     Giardia Antigen - Stool, Per Rectum  -     Ova & Parasite Examination - Stool, Per Rectum  -     Stool Culture (Reference Lab) - Stool, Per Rectum  -     Case Request; Standing  -     Case Request    Other orders  -     Follow Anesthesia Guidelines / Standing Orders; Future  -     Obtain Informed Consent; Future  -     Implement Anesthesia orders day of procedure.; Standing  -     Obtain informed consent; Standing  -     Verify bowel prep was successful; Standing  -     Give tap water enema if bowel prep was insufficient; Standing      Assessment:  1. Heartburn despite Francisco Fundoplicagtion  2. Chronic cough  3. H/o colon polyps.  4. FH (mom) colon cancer.   5. Loose bowels with mucous.    Recommendations:  1. EGD and colonoscopy  2. Pantoprazole 40 mg/day  3. Decrease ETOH and decrease coffee and decafeinated coffee.    4. CXR  5. Stool studies.   6. Celiac sprue antibodies.     No follow-ups on file.    Klaus Omalley MD  5/19/2021

## 2021-05-20 LAB
GLIADIN PEPTIDE IGA SER-ACNC: 7 UNITS (ref 0–19)
GLIADIN PEPTIDE IGG SER-ACNC: 2 UNITS (ref 0–19)
IGA SERPL-MCNC: 181 MG/DL (ref 61–437)
TTG IGA SER-ACNC: <2 U/ML (ref 0–3)
TTG IGG SER-ACNC: <2 U/ML (ref 0–5)

## 2021-05-20 NOTE — PROGRESS NOTES
05/20/21  Tell him that his celiac sprue antibody panel came back negative.  He with the stool studies show, what the chest x-ray shows, and what the EGD and colonoscopy show.  Please fax a copy of this report to his PCP  Dorothy da silva

## 2021-05-21 ENCOUNTER — TELEPHONE (OUTPATIENT)
Dept: GASTROENTEROLOGY | Facility: CLINIC | Age: 71
End: 2021-05-21

## 2021-05-21 NOTE — TELEPHONE ENCOUNTER
----- Message from Klaus Omalley MD sent at 5/20/2021  4:54 PM EDT -----  05/20/21  Tell him that his celiac sprue antibody panel came back negative.  He with the stool studies show, what the chest x-ray shows, and what the EGD and colonoscopy show.  Please fax a copy of this report to his PCP  Rojas. [email or web address]

## 2021-05-21 NOTE — TELEPHONE ENCOUNTER
Called pt and left vm for pt to call back.     Results sent to Dr Bradford thru [facility].

## 2021-05-25 ENCOUNTER — TELEPHONE (OUTPATIENT)
Dept: GASTROENTEROLOGY | Facility: CLINIC | Age: 71
End: 2021-05-25

## 2021-05-25 NOTE — TELEPHONE ENCOUNTER
----- Message from Katia Brothers sent at 5/25/2021  8:14 AM EDT -----  Regarding: Call Back  Contact: [phone]  Pt is returning your call

## 2021-06-02 ENCOUNTER — HOSPITAL ENCOUNTER (OUTPATIENT)
Dept: GENERAL RADIOLOGY | Facility: HOSPITAL | Age: 71
Discharge: HOME | End: 2021-06-02
Admitting: INTERNAL MEDICINE

## 2021-06-02 ENCOUNTER — TELEPHONE (OUTPATIENT)
Dept: GASTROENTEROLOGY | Facility: CLINIC | Age: 71
End: 2021-06-02

## 2021-06-02 DIAGNOSIS — R05.9 COUGH: ICD-10-CM

## 2021-06-02 PROCEDURE — 71046 X-RAY EXAM CHEST 2 VIEWS: CPT

## 2021-06-02 NOTE — TELEPHONE ENCOUNTER
Call from pt.  States stool kit inadvertently discarded.     Advise will have new kit at .  Verb understanding.

## 2021-06-04 LAB
FA STL QL: NORMAL
NEUTRAL FAT STL QL: NORMAL
WBC STL QL MICRO: NORMAL
WBC STL QL MICRO: NORMAL

## 2021-06-05 LAB
C DIFF TOX A+B STL QL IA: NEGATIVE
G LAMBLIA AG STL QL IA: NEGATIVE

## 2021-06-07 LAB
BACTERIA SPEC CULT: NORMAL
BACTERIA SPEC CULT: NORMAL
CAMPYLOBACTER STL CULT: NORMAL
E COLI SXT STL QL IA: NEGATIVE
SALM + SHIG STL CULT: NORMAL

## 2021-06-10 LAB
O+P SPEC MICRO: NORMAL
O+P STL CONC: NORMAL

## 2021-06-11 ENCOUNTER — TELEPHONE (OUTPATIENT)
Dept: GASTROENTEROLOGY | Facility: CLINIC | Age: 71
End: 2021-06-11

## 2021-06-11 NOTE — TELEPHONE ENCOUNTER
Called pt and advised of Dr Omalley's note. Verb understanding.     Results sent to Dr Bradford thru [facility].

## 2021-06-11 NOTE — TELEPHONE ENCOUNTER
----- Message from Klaus Omalley MD sent at 6/3/2021  6:13 PM EDT -----  06/03/21       Tell him that his chest x-ray looked normal.  Please fax a copy of this report to his PCP.  Rojas. [email or web address]

## 2021-06-22 ENCOUNTER — TRANSCRIBE ORDERS (OUTPATIENT)
Dept: SLEEP MEDICINE | Facility: HOSPITAL | Age: 71
End: 2021-06-22

## 2021-06-22 DIAGNOSIS — Z01.818 OTHER SPECIFIED PRE-OPERATIVE EXAMINATION: Primary | ICD-10-CM

## 2021-06-24 ENCOUNTER — TELEPHONE (OUTPATIENT)
Dept: GASTROENTEROLOGY | Facility: CLINIC | Age: 71
End: 2021-06-24

## 2021-06-24 DIAGNOSIS — R19.7 DIARRHEA, UNSPECIFIED TYPE: Primary | ICD-10-CM

## 2021-06-24 NOTE — PROGRESS NOTES
06/23/21       Tell him that his stool studies look mostly normal. One stool study shows too much fat in his stool. I would recommend that we repeat a stool study for qualitative fecal fat. I would have him make sure that he is off of all fat containing supplements including MVI, vitamin D, fish oil. Have him stop those supplements for one week then repeat the stool for qualitative fecal fat. We will see what the EGD and colonoscopy show? FAX to his PCP.   Thx. kjh

## 2021-06-24 NOTE — TELEPHONE ENCOUNTER
----- Message from Klaus Omalley MD sent at 6/23/2021  8:58 PM EDT -----  06/23/21       Tell him that his stool studies look mostly normal. One stool study shows too much fat in his stool. I would recommend that we repeat a stool study for qualitative fecal fat. I would have him make sure that he is off of all fat containing supplements including MVI, vitamin D, fish oil. Have him stop those supplements for one week then repeat the stool for qualitative fecal fat. We will see what the EGD and colonoscopy show? FAX to his PCP.   Rojas. kj

## 2021-06-28 NOTE — TELEPHONE ENCOUNTER
Called pt and advised of Dr Omalley's note.Advised we will have stool kit at  for him.  Pt verb understanding.

## 2021-07-29 ENCOUNTER — LAB (OUTPATIENT)
Dept: LAB | Facility: HOSPITAL | Age: 71
End: 2021-07-29

## 2021-07-29 ENCOUNTER — TRANSCRIBE ORDERS (OUTPATIENT)
Dept: SLEEP MEDICINE | Facility: HOSPITAL | Age: 71
End: 2021-07-29

## 2021-07-29 ENCOUNTER — TELEPHONE (OUTPATIENT)
Dept: GASTROENTEROLOGY | Facility: CLINIC | Age: 71
End: 2021-07-29

## 2021-07-29 DIAGNOSIS — Z01.818 OTHER SPECIFIED PRE-OPERATIVE EXAMINATION: Primary | ICD-10-CM

## 2021-07-29 LAB — SARS-COV-2 ORF1AB RESP QL NAA+PROBE: NOT DETECTED

## 2021-07-29 PROCEDURE — U0004 COV-19 TEST NON-CDC HGH THRU: HCPCS | Performed by: INTERNAL MEDICINE

## 2021-07-29 PROCEDURE — C9803 HOPD COVID-19 SPEC COLLECT: HCPCS | Performed by: INTERNAL MEDICINE

## 2021-07-29 PROCEDURE — U0005 INFEC AGEN DETEC AMPLI PROBE: HCPCS | Performed by: INTERNAL MEDICINE

## 2021-07-29 NOTE — TELEPHONE ENCOUNTER
PT stating that he missed his COVID test, advised PT that with him having an EGD done tomorrow the test has to be done 48 hours prior to his procedure. PT stated that he will need to re-schedule. Transferred to Dr. Omalley's  Chasidy miller.

## 2021-07-30 ENCOUNTER — ANESTHESIA (OUTPATIENT)
Dept: GASTROENTEROLOGY | Facility: HOSPITAL | Age: 71
End: 2021-07-30

## 2021-07-30 ENCOUNTER — HOSPITAL ENCOUNTER (OUTPATIENT)
Facility: HOSPITAL | Age: 71
Setting detail: HOSPITAL OUTPATIENT SURGERY
Discharge: HOME | End: 2021-07-30
Attending: INTERNAL MEDICINE | Admitting: INTERNAL MEDICINE

## 2021-07-30 ENCOUNTER — ANESTHESIA EVENT (OUTPATIENT)
Dept: GASTROENTEROLOGY | Facility: HOSPITAL | Age: 71
End: 2021-07-30

## 2021-07-30 VITALS
HEART RATE: 60 BPM | HEIGHT: 69 IN | TEMPERATURE: 97.9 F | SYSTOLIC BLOOD PRESSURE: 123 MMHG | BODY MASS INDEX: 28.08 KG/M2 | WEIGHT: 189.6 LBS | OXYGEN SATURATION: 99 % | DIASTOLIC BLOOD PRESSURE: 86 MMHG | RESPIRATION RATE: 18 BRPM

## 2021-07-30 DIAGNOSIS — K21.9 GASTROESOPHAGEAL REFLUX DISEASE, UNSPECIFIED WHETHER ESOPHAGITIS PRESENT: ICD-10-CM

## 2021-07-30 DIAGNOSIS — Z80.0 FAMILY HISTORY OF COLON CANCER: ICD-10-CM

## 2021-07-30 DIAGNOSIS — K63.5 POLYP OF COLON, UNSPECIFIED PART OF COLON, UNSPECIFIED TYPE: ICD-10-CM

## 2021-07-30 DIAGNOSIS — R05.9 COUGH: ICD-10-CM

## 2021-07-30 DIAGNOSIS — R19.7 DIARRHEA, UNSPECIFIED TYPE: ICD-10-CM

## 2021-07-30 PROCEDURE — 88305 TISSUE EXAM BY PATHOLOGIST: CPT | Performed by: INTERNAL MEDICINE

## 2021-07-30 PROCEDURE — 25010000002 PROPOFOL 10 MG/ML EMULSION: Performed by: ANESTHESIOLOGY

## 2021-07-30 PROCEDURE — 45385 COLONOSCOPY W/LESION REMOVAL: CPT | Performed by: INTERNAL MEDICINE

## 2021-07-30 PROCEDURE — 25010000002 GLUCAGON (RDNA) PER 1 MG: Performed by: INTERNAL MEDICINE

## 2021-07-30 PROCEDURE — 45381 COLONOSCOPY SUBMUCOUS NJX: CPT | Performed by: INTERNAL MEDICINE

## 2021-07-30 PROCEDURE — 43239 EGD BIOPSY SINGLE/MULTIPLE: CPT | Performed by: INTERNAL MEDICINE

## 2021-07-30 PROCEDURE — 45380 COLONOSCOPY AND BIOPSY: CPT | Performed by: INTERNAL MEDICINE

## 2021-07-30 RX ORDER — PROPOFOL 10 MG/ML
VIAL (ML) INTRAVENOUS CONTINUOUS PRN
Status: DISCONTINUED | OUTPATIENT
Start: 2021-07-30 | End: 2021-07-30 | Stop reason: SURG

## 2021-07-30 RX ORDER — SODIUM CHLORIDE, SODIUM LACTATE, POTASSIUM CHLORIDE, CALCIUM CHLORIDE 600; 310; 30; 20 MG/100ML; MG/100ML; MG/100ML; MG/100ML
30 INJECTION, SOLUTION INTRAVENOUS CONTINUOUS PRN
Status: DISCONTINUED | OUTPATIENT
Start: 2021-07-30 | End: 2021-07-30 | Stop reason: HOSPADM

## 2021-07-30 RX ORDER — LIDOCAINE HYDROCHLORIDE 20 MG/ML
INJECTION, SOLUTION INFILTRATION; PERINEURAL AS NEEDED
Status: DISCONTINUED | OUTPATIENT
Start: 2021-07-30 | End: 2021-07-30 | Stop reason: SURG

## 2021-07-30 RX ORDER — PROPOFOL 10 MG/ML
VIAL (ML) INTRAVENOUS AS NEEDED
Status: DISCONTINUED | OUTPATIENT
Start: 2021-07-30 | End: 2021-07-30 | Stop reason: SURG

## 2021-07-30 RX ADMIN — Medication 300 MCG/KG/MIN: at 10:51

## 2021-07-30 RX ADMIN — SODIUM CHLORIDE, POTASSIUM CHLORIDE, SODIUM LACTATE AND CALCIUM CHLORIDE 30 ML/HR: 600; 310; 30; 20 INJECTION, SOLUTION INTRAVENOUS at 10:17

## 2021-07-30 RX ADMIN — LIDOCAINE HYDROCHLORIDE 100 MG: 20 INJECTION, SOLUTION INFILTRATION; PERINEURAL at 10:48

## 2021-07-30 RX ADMIN — PROPOFOL 200 MG: 10 INJECTION, EMULSION INTRAVENOUS at 10:50

## 2021-07-30 NOTE — ANESTHESIA POSTPROCEDURE EVALUATION
"Patient: Paul Sterling    Procedure Summary     Date: 07/30/21 [facility] / [facility]    Anesthesia Start: 1042 Anesthesia Stop: 1159    Procedures:       ESOPHAGOGASTRODUODENOSCOPY WITH COLD BIOPSIES (N/A Esophagus)      COLONOSCOPY  TO CECUM WITH COLD BIOPSIES AND HOT SNARE POLYPECTOMY AND SALINE LIFT INJECTION AND ORISE GEL (N/A ) Diagnosis:       Family history of colon cancer      Gastroesophageal reflux disease, unspecified whether esophagitis present      Polyp of colon, unspecified part of colon, unspecified type      Cough      Diarrhea, unspecified type      (Family history of colon cancer [Z80.0])      (Gastroesophageal reflux disease, unspecified whether esophagitis present [K21.9])      (Polyp of colon, unspecified part of colon, unspecified type [K63.5])      (Cough [R05])      (Diarrhea, unspecified type [R19.7])    Surgeons: Klaus Omalley MD Provider: Andrea Franco MD    Anesthesia Type: MAC ASA Status: 3          Anesthesia Type: MAC    Vitals  No vitals data found for the desired time range.          Post Anesthesia Care and Evaluation    Patient location during evaluation: bedside  Patient participation: complete - patient participated  Level of consciousness: sleepy but conscious  Pain score: 0  Pain management: adequate  Airway patency: patent  Anesthetic complications: No anesthetic complications    Cardiovascular status: acceptable  Respiratory status: acceptable  Hydration status: acceptable    Comments: /91 (BP Location: Left arm, Patient Position: Sitting)   Pulse 57   Temp 36.6 °C (97.9 °F) (Oral)   Resp 12   Ht 175.3 cm (69\")   Wt 86 kg (189 lb 9.6 oz)   SpO2 100%   BMI 28.00 kg/m²         "

## 2021-07-30 NOTE — ANESTHESIA PREPROCEDURE EVALUATION
Anesthesia Evaluation     Patient summary reviewed and Nursing notes reviewed   NPO Solid Status: > 8 hours  NPO Liquid Status: > 4 hours           Airway   Mallampati: II  Neck ROM: full  No difficulty expected  Dental      Comment: Bridge upper    Pulmonary     breath sounds clear to auscultation  Cardiovascular     Rhythm: regular    (+) hypertension, hyperlipidemia,       Neuro/Psych  (+) tremors,     GI/Hepatic/Renal/Endo    (+)  GERD, GI bleeding ,     Musculoskeletal     Abdominal    Substance History      OB/GYN          Other   arthritis,                    Anesthesia Plan    ASA 3     MAC     intravenous induction     Anesthetic plan, all risks, benefits, and alternatives have been provided, discussed and informed consent has been obtained with: patient.

## 2021-08-02 LAB
CYTO UR: NORMAL
LAB AP CASE REPORT: NORMAL
PATH REPORT.FINAL DX SPEC: NORMAL
PATH REPORT.GROSS SPEC: NORMAL

## 2021-08-03 ENCOUNTER — TELEPHONE (OUTPATIENT)
Dept: GASTROENTEROLOGY | Facility: CLINIC | Age: 71
End: 2021-08-03

## 2021-08-03 DIAGNOSIS — K90.9 STEATORRHEA: Primary | ICD-10-CM

## 2021-08-03 NOTE — TELEPHONE ENCOUNTER
----- Message from Paul Sterling sent at 8/2/2021  9:40 PM EDT -----  Regarding: Test Results Question  Contact: [phone]  Regarding my colonoscopy & egd biopsies, what is the significance of the prominent lymphoid aggregates in biopsies 7 and 8?

## 2021-08-03 NOTE — TELEPHONE ENCOUNTER
I reviewed the results of pathology from his EGD and colonoscopy.  I recommend that he have a repeat colonoscopy in 1 year.  I would want to use a more aggressive colonoscopy prep next year such as a split dose GoLYTELY prep.       Also the patient does still have loose stool, 5-6 bowel movements a day.  The colon biopsies came back normal.  He is going to hand and a repeat stool study for fecal fat.  I told him that we would schedule him for a CT of the abdomen and pelvis with pancreatic protocol because of the stearrhea.  I would like him to follow-up with me in the office about a week after the CAT scan of the abdomen and pelvis is done.       SA/MT - I ordered the above CT abd/pelvis with pancreatic protocol. Please schedule the patient to see me in the office about a week after the CAT scan of the abdomen and pelvis is done.

## 2021-08-07 LAB
FA STL QL: NORMAL
NEUTRAL FAT STL QL: NORMAL

## 2021-08-10 ENCOUNTER — TELEPHONE (OUTPATIENT)
Dept: GASTROENTEROLOGY | Facility: CLINIC | Age: 71
End: 2021-08-10

## 2021-08-10 NOTE — TELEPHONE ENCOUNTER
----- Message from Klaus Omalley MD sent at 8/8/2021  4:12 PM EDT -----  08/08/21  Tell him that his second stool for qualitative fecal fat came back + for fecal fat. We will see what the CT abd/pelvis shows.   Thx. kjh

## 2021-08-11 ENCOUNTER — TELEPHONE (OUTPATIENT)
Dept: GASTROENTEROLOGY | Facility: CLINIC | Age: 71
End: 2021-08-11

## 2021-08-12 ENCOUNTER — HOSPITAL ENCOUNTER (OUTPATIENT)
Dept: CT IMAGING | Facility: HOSPITAL | Age: 71
Discharge: HOME | End: 2021-08-12
Admitting: INTERNAL MEDICINE

## 2021-08-12 DIAGNOSIS — K90.9 STEATORRHEA: ICD-10-CM

## 2021-08-12 PROCEDURE — 25010000002 IOPAMIDOL 61 % SOLUTION: Performed by: INTERNAL MEDICINE

## 2021-08-12 PROCEDURE — 74177 CT ABD & PELVIS W/CONTRAST: CPT

## 2021-08-12 PROCEDURE — 82565 ASSAY OF CREATININE: CPT

## 2021-08-12 PROCEDURE — 0 DIATRIZOATE MEGLUMINE & SODIUM PER 1 ML: Performed by: INTERNAL MEDICINE

## 2021-08-12 RX ADMIN — IOPAMIDOL 100 ML: 612 INJECTION, SOLUTION INTRAVENOUS at 08:36

## 2021-08-12 RX ADMIN — DIATRIZOATE MEGLUMINE AND DIATRIZOATE SODIUM 30 ML: 660; 100 LIQUID ORAL; RECTAL at 07:30

## 2021-08-13 NOTE — PROGRESS NOTES
08/13/21  Tell Dr. Sterling that his CT abd/pelvis shows a normal pancreas.  There is a small hiatal hernia with suspected mild distal esophagitis although I really think this is probably from the fundoplication wrap.  The CT also shows evidence of constipation.  The prostate gland is mildly enlarged.  There is a small periumbilical hernia containing fat.        Have him come see me in the office and we can discuss in more detail.       Please fax a copy of this report to his PCP.  Dorothy da silva

## 2021-08-16 LAB — CREAT BLDA-MCNC: 0.9 MG/DL (ref 0.6–1.3)

## 2021-08-17 ENCOUNTER — TELEPHONE (OUTPATIENT)
Dept: GASTROENTEROLOGY | Facility: CLINIC | Age: 71
End: 2021-08-17

## 2021-08-17 NOTE — TELEPHONE ENCOUNTER
I had placed referral for urology in 2/2021 dr farzana mckee.  Don't see a note from urology.  With CT showing enlarged prostate, can you call and let pt know that I recommend urology f/u if he hasn't already had his prostate checked by Dr. Mckee. thanks

## 2021-08-17 NOTE — TELEPHONE ENCOUNTER
----- Message from Klaus Omalley MD sent at 8/13/2021  4:53 PM EDT -----  08/13/21  Tell Dr. Sterling that his CT abd/pelvis shows a normal pancreas.  There is a small hiatal hernia with suspected mild distal esophagitis although I really think this is probably from the fundoplication wrap.  The CT also shows evidence of constipation.  The prostate gland is mildly enlarged.  There is a small periumbilical hernia containing fat.        Have him come see me in the office and we can discuss in more detail.       Please fax a copy of this report to his PCP.  Rojas. [email or web address]

## 2021-08-19 ENCOUNTER — OFFICE VISIT (OUTPATIENT)
Dept: GASTROENTEROLOGY | Facility: CLINIC | Age: 71
End: 2021-08-19

## 2021-08-19 VITALS
HEIGHT: 69 IN | WEIGHT: 189.8 LBS | DIASTOLIC BLOOD PRESSURE: 60 MMHG | BODY MASS INDEX: 28.11 KG/M2 | SYSTOLIC BLOOD PRESSURE: 110 MMHG | TEMPERATURE: 96.9 F

## 2021-08-19 DIAGNOSIS — Z80.0 FAMILY HISTORY OF COLON CANCER: ICD-10-CM

## 2021-08-19 DIAGNOSIS — R05.9 COUGH: ICD-10-CM

## 2021-08-19 DIAGNOSIS — K90.9 STEATORRHEA: Primary | ICD-10-CM

## 2021-08-19 DIAGNOSIS — K63.5 POLYP OF COLON, UNSPECIFIED PART OF COLON, UNSPECIFIED TYPE: ICD-10-CM

## 2021-08-19 DIAGNOSIS — R19.7 DIARRHEA, UNSPECIFIED TYPE: ICD-10-CM

## 2021-08-19 PROCEDURE — 99214 OFFICE O/P EST MOD 30 MIN: CPT | Performed by: INTERNAL MEDICINE

## 2021-08-19 NOTE — TELEPHONE ENCOUNTER
Pt did not see  because he had several other health concerns he was having addressed,but will call and scheudule with Graham Holbrook.

## 2021-08-19 NOTE — PROGRESS NOTES
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  My assessment and plan was as follows:  Assessment:  1. Retired dentist with heartburn despite Francisco Fundoplicagtion  2. Chronic cough  3. H/o colon polyps.  4. FH (mom) colon cancer.   5. Loose bowels with mucous and fat in the stool.     Recommendations:  1. EGD and colonoscopy  2. Pantoprazole 40 mg/day  3. Decrease ETOH and decrease coffee and decafeinated coffee.    4. CXR  5. Stool studies.   6. Celiac sprue antibodies.        On 7/30/2021 he had an EGD and colonoscopy.  The EGD showed:  Assessment:  - Z-line irregular, 38 cm from the incisors. Biopsied.  - Erythematous mucosa in the stomach. Biopsied. I didn't see what I clearly thought was a fundoplication wrap.  - No gross lesions in the entire examined duodenum. Biopsied.       Colonoscopy done 7/30/2021 showed only a fair colonic preparation.  There were 6 small colon polyps that were removed.  I had recommended repeat colonoscopy in 1 year because he only had a fair colonic preparation.       He had a CT of the abdomen and pelvis.  My recommendations after reviewing the CT were as follows:  08/13/21  Tell Dr. Sterling that his CT abd/pelvis shows a normal pancreas.  There is a small hiatal hernia with suspected mild distal esophagitis although I really think this is probably from the fundoplication wrap.  The CT also shows evidence of constipation.  The prostate gland is mildly enlarged.  There is a small periumbilical hernia containing fat.        He has 3-5 BM/day. Drinks 3-4 beers/day. No abdominal or chest pain. No nasuea or vomiting. NO fevers, chills. Weeight stable.     Past Medical History:   Diagnosis Date   • Arthritis    • Noonan's esophagus    • Blood in stool    • Colon polyps    • GERD (gastroesophageal reflux disease)    • History of colonic polyps     • Hyperlipidemia    • Hypertension        Past Surgical History:   Procedure Laterality Date   • CHOLECYSTECTOMY N/A 2010    Dr. Angel Gordon, [facility]   • COLONOSCOPY N/A 6/20/2018    Procedure: COLONOSCOPY to ccecum with cold bx polypectomy;  Surgeon: Stuart Hernandez MD;  [facility];  Service: Gastroenterology   • COLONOSCOPY N/A 7/30/2021    Procedure: COLONOSCOPY  TO CECUM WITH COLD BIOPSIES AND HOT SNARE POLYPECTOMY AND SALINE LIFT INJECTION AND ORISE GEL;  Surgeon: Klaus Omalley MD;  [facility];  Service: Gastroenterology;  Laterality: N/A;  PRE:FAMILY HISTORY OF COLON CANCER  POST: POLYPS, FAIR PREP   • ENDOSCOPY N/A 6/20/2018    Procedure: ESOPHAGOGASTRODUODENOSCOPY with bx;  Surgeon: Stuart Hernandez MD;  Location: [facility] ENDOSCOPY;  Service: Gastroenterology   • ENDOSCOPY N/A 7/30/2021    Procedure: ESOPHAGOGASTRODUODENOSCOPY WITH COLD BIOPSIES;  Surgeon: Klaus Omalley MD;  [facility];  Service: Gastroenterology;  Laterality: N/A;  PRE: DYSPEPSIAPOST:GASTRITIS   • ENDOSCOPY AND COLONOSCOPY N/A 2015    Dr. Angel Gordon, [facility]   • INGUINAL HERNIA REPAIR Bilateral 2010, 2014    Dr. Angel Gordon, [facility]   • NISSEN FUNDOPLICATION LAPAROSCOPIC N/A    • UMBILICAL HERNIA REPAIR N/A 2010    Dr. Angel Gordon, [facility]   • UPPER GASTROINTESTINAL ENDOSCOPY  07/30/2021         Current Outpatient Medications:   •  atorvastatin (LIPITOR) 10 MG tablet, TAKE 1 TABLET BY MOUTH EVERY DAY, Disp: 90 tablet, Rfl: 1  •  clonazePAM (KlonoPIN) 0.5 MG tablet, Take 1 tablet by mouth Daily As Needed (HAND TREMORS)., Disp: 90 tablet, Rfl: 0  •  Glucosamine-Chondroit-Vit C-Mn (GLUCOSAMINE CHONDR 1500 COMPLX PO), Take 1 tablet by mouth Daily., Disp: , Rfl:   •  hydrocortisone 1 % cream, Apply 1 application topically to the appropriate area as directed As Needed., Disp: , Rfl:   •  hydrocortisone 2.5 % cream, Apply 1 application topically to the appropriate area as directed As Needed.,  Disp: , Rfl: 3  •  hydrOXYzine (ATARAX) 25 MG tablet, Take 25 mg by mouth 3 (Three) Times a Day As Needed for Itching., Disp: , Rfl:   •  Loratadine (CLARITIN PO), Take 30 mg by mouth Daily., Disp: , Rfl:   •  losartan (COZAAR) 25 MG tablet, TAKE 1 TABLET BY MOUTH EVERY DAY, Disp: 90 tablet, Rfl: 1  •  Mirabegron ER (Myrbetriq) 50 MG tablet sustained-release 24 hour 24 hr tablet, Take 50 mg by mouth Daily., Disp: 90 tablet, Rfl: 1  •  Multiple Vitamins-Minerals (PRESERVISION AREDS 2 PO), Take 2 capsules by mouth Daily., Disp: , Rfl:   •  pantoprazole (PROTONIX) 40 MG EC tablet, Take 1 tablet by mouth Daily., Disp: 30 tablet, Rfl: 11  •  psyllium (METAMUCIL) 58.6 % packet, Take 1 packet by mouth Daily., Disp: , Rfl:   •  triamcinolone (KENALOG) 0.1 % ointment, Apply 1 application topically to the appropriate area as directed As Needed., Disp: , Rfl:   •  triamcinolone (NASACORT AQ) 55 MCG/ACT nasal inhaler, 4 Squirts into each nostril daily., Disp: , Rfl:     No Known Allergies    Family History   Problem Relation Age of Onset   • Parkinsonism Mother    • Colon cancer Mother    • Colon polyps Mother    • Tremor Father    • Coronary artery disease Father    • Malig Hyperthermia Neg Hx        Social History     Socioeconomic History   • Marital status:      Spouse name: Not on file   • Number of children: Not on file   • Years of education: Not on file   • Highest education level: Not on file   Tobacco Use   • Smoking status: Never Smoker   • Smokeless tobacco: Never Used   Vaping Use   • Vaping Use: Never used   Substance and Sexual Activity   • Alcohol use: Yes     Alcohol/week: 20.0 standard drinks     Types: 20 Cans of beer per week     Comment: weekly   • Drug use: No   • Sexual activity: Defer       Review of Systems   Gastrointestinal: Positive for diarrhea. Negative for abdominal pain.   All other systems reviewed and are negative.    Pertinent positives and negatives documented in the HPI and all other  systems reviewed and were found to be negative.  Vitals:    08/19/21 1153   BP: 110/60   Temp: 96.9 °F (36.1 °C)       Physical Exam  Vitals reviewed.   Constitutional:       General: He is not in acute distress.     Appearance: Normal appearance. He is well-developed. He is not diaphoretic.   HENT:      Head: Normocephalic and atraumatic. Hair is normal.      Right Ear: Hearing, tympanic membrane, ear canal and external ear normal.      Left Ear: Hearing, tympanic membrane, ear canal and external ear normal.      Nose: Nose normal. No nasal deformity.      Mouth/Throat:      Mouth: Mucous membranes are moist. No oral lesions.      Pharynx: Uvula midline. No uvula swelling.   Eyes:      General: Lids are normal. No scleral icterus.        Right eye: No discharge.         Left eye: No discharge.      Extraocular Movements: Extraocular movements intact.      Right eye: Normal extraocular motion and no nystagmus.      Left eye: Normal extraocular motion and no nystagmus.      Conjunctiva/sclera: Conjunctivae normal.      Pupils: Pupils are equal, round, and reactive to light.   Neck:      Thyroid: No thyromegaly.      Vascular: No JVD.   Cardiovascular:      Rate and Rhythm: Normal rate and regular rhythm.      Pulses: Normal pulses.      Heart sounds: Normal heart sounds. No murmur heard.   No gallop.    Pulmonary:      Effort: Pulmonary effort is normal. No respiratory distress.      Breath sounds: Normal breath sounds. No wheezing or rales.   Chest:      Chest wall: No tenderness.   Abdominal:      General: Bowel sounds are normal. There is no distension.      Palpations: Abdomen is soft. There is no mass.      Tenderness: There is no abdominal tenderness. There is no guarding.      Hernia: No hernia is present.   Musculoskeletal:         General: No tenderness or deformity. Normal range of motion.      Cervical back: Normal range of motion and neck supple.   Lymphadenopathy:      Cervical: No cervical adenopathy.    Skin:     General: Skin is warm and dry.      Findings: No rash.   Neurological:      Mental Status: He is alert and oriented to person, place, and time.      Cranial Nerves: No cranial nerve deficit.      Motor: No abnormal muscle tone.      Coordination: Coordination normal.      Deep Tendon Reflexes: Reflexes are normal and symmetric. Reflexes normal.   Psychiatric:         Mood and Affect: Mood normal.         Behavior: Behavior normal.         Thought Content: Thought content normal.         Judgment: Judgment normal.         Diagnoses and all orders for this visit:    1. Steatorrhea (Primary)  -     FL Small Bowel Follow Through Single-Contrast; Future    2. Diarrhea, unspecified type  -     FL Small Bowel Follow Through Single-Contrast; Future    3. Family history of colon cancer  -     FL Small Bowel Follow Through Single-Contrast; Future    4. Polyp of colon, unspecified part of colon, unspecified type  -     FL Small Bowel Follow Through Single-Contrast; Future    5. Cough  -     Ambulatory Referral to Pulmonology      Assessment:  1. Retired dentist with heartburn despite Francisco Fundoplicagtion  2. Chronic cough  3. H/o colon polyps.  4. FH (mom) colon cancer.   5. Loose bowels with mucous and fat in the stool. Could he have chronic pancreatitis associated with 3-4 beers/day  6.          Recommendations:  1. Trial of pancreatic enzymes to see if the stool frequency improves. Creon 36,000 units lipase 1 po with each meal or snack.   2. Decrease ETOH use.   3. Repeat colonoscopy in 7/22 and use a more aggressive colonoscopy prep.   4. SBFT to r/o a small bowel source of fat in stool.   5. I want him to see a Pulmonary MD about his chronic cough.   6. F/u 4 weeks.     No follow-ups on file.    Klaus Omalley MD  8/19/2021

## 2021-08-25 ENCOUNTER — TELEPHONE (OUTPATIENT)
Dept: GASTROENTEROLOGY | Facility: CLINIC | Age: 71
End: 2021-08-25

## 2021-08-30 NOTE — TELEPHONE ENCOUNTER
Call to pt.  Advise per Dr Omalley note.  Verb understanding.     Has upcoming appt with SHARI Natarajan on 9/13.     States has not heard from pulmonologist re: referral.  See o/v note of 8/19 and referral to Dr Diallo.  Message to Albina.

## 2021-08-31 RX ORDER — ATORVASTATIN CALCIUM 10 MG/1
TABLET, FILM COATED ORAL
Qty: 90 TABLET | Refills: 1 | Status: SHIPPED | OUTPATIENT
Start: 2021-08-31 | End: 2022-03-08

## 2021-08-31 NOTE — TELEPHONE ENCOUNTER
Rx Refill Note  Requested Prescriptions     Pending Prescriptions Disp Refills   • atorvastatin (LIPITOR) 10 MG tablet [Pharmacy Med Name: ATORVASTATIN 10 MG TABLET] 90 tablet 1     Sig: TAKE 1 TABLET BY MOUTH EVERY DAY      Last office visit with prescribing clinician: [id number]      Next office visit with prescribing clinician: 02/15/2022            Aryan Eugene MA  08/31/21, 14:38 EDT

## 2021-08-31 NOTE — TELEPHONE ENCOUNTER
Verified Referral has been received, Faxed all records and Pulmonary Office will call PT to schedule OV.

## 2021-09-14 NOTE — PROGRESS NOTES
Subjective   Patient ID: Paul Sterling is a 71 y.o. male is being seen for consultation today at the request of Ofelia Bradford MD     He is being seen for Degenerative disc disease, lumbar    2/9/21  MRI L spine [facility]    Today patient reports low back pain.  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  About 8 months ago he had the spontaneous onset of low back pain worse on the left without sciatica after waking up from sleep.  Previous to that has had some very mild off-and-on low back pain but never debilitating.  This was quite painful but it did take about 2 weeks for it to resolve and he was back to his baseline.  About 3 weeks ago it happened again and this time it did resolve but it took a little bit longer.  He is never had leg symptoms such as sciatic pain or numbness or tingling or weakness.  His neurological exam is unremarkable except for some mild decreased range of motion in flexion and rotation.  We discussed his MRI which did show rather severe disc degeneration at L2L3 with some endplate edema and a left L2-L3 disc protrusion.  I talked about the nature of degenerative disc disease and how he can have symptoms like this occur again and how in fact he might develop sciatic in the future.  I told the best thing to do is to get back to more core based exercise program.  He had done therapy for his back years ago but not anytime recently.  I asked him to have some visits with the therapist to set up a more updated home exercise program focusing on psoas muscle strengthening.  I told the can strongly consider yoga and Pilates in the future.  He can continue his walking.  We can keep it open-ended since he is back to his baseline but if he develops severe recurrent symptoms again he can contact us directly and we can evaluate him again.          Back Pain  The pain is present in the lumbar spine. The quality of the pain is described as aching. The pain  "does not radiate. The pain is at a severity of 2/10. The pain is worse during the day. The symptoms are aggravated by sitting. Pertinent negatives include no fever, numbness or tingling. He has tried home exercises (yoga) for the symptoms.       The following portions of the patient's history were reviewed and updated as appropriate: allergies, current medications, past family history, past medical history, past social history, past surgical history and problem list.    Review of Systems   Constitutional: Negative for chills and fever.   HENT: Negative for congestion.    Eyes: Negative for visual disturbance.   Respiratory: Negative for shortness of breath.    Cardiovascular: Negative for palpitations.   Gastrointestinal: Negative for nausea.   Endocrine: Negative for cold intolerance.   Genitourinary: Negative for urgency.   Musculoskeletal: Positive for back pain.   Skin: Negative for rash.   Allergic/Immunologic: Negative for environmental allergies.   Neurological: Negative for tingling and numbness.   Hematological: Negative for adenopathy.   Psychiatric/Behavioral: The patient is not nervous/anxious.    All other systems reviewed and are negative.          Objective     Vitals:    09/15/21 0905   BP: 102/68   Temp: 98.2 °F (36.8 °C)   Weight: 83.6 kg (184 lb 3.2 oz)   Height: 175.3 cm (69\")     Body mass index is 27.2 kg/m².      Physical Exam  Constitutional:       Appearance: He is well-developed.   HENT:      Head: Normocephalic and atraumatic.   Eyes:      Extraocular Movements: EOM normal.      Conjunctiva/sclera: Conjunctivae normal.      Pupils: Pupils are equal, round, and reactive to light.   Neck:      Vascular: No carotid bruit.   Neurological:      Mental Status: He is oriented to person, place, and time.      Coordination: Finger-Nose-Finger Test and Heel to Shin Test normal.      Gait: Gait is intact.      Deep Tendon Reflexes:      Reflex Scores:       Tricep reflexes are 2+ on the right side " and 2+ on the left side.       Bicep reflexes are 2+ on the right side and 2+ on the left side.       Brachioradialis reflexes are 2+ on the right side and 2+ on the left side.       Patellar reflexes are 2+ on the right side and 2+ on the left side.       Achilles reflexes are 2+ on the right side and 2+ on the left side.  Psychiatric:         Speech: Speech normal.       Neurologic Exam     Mental Status   Oriented to person, place, and time.   Registration of memory: Good recent and remote memory.   Attention: normal. Concentration: normal.   Speech: speech is normal   Level of consciousness: alert  Knowledge: consistent with education.     Cranial Nerves     CN II   Visual fields full to confrontation.   Visual acuity: normal    CN III, IV, VI   Pupils are equal, round, and reactive to light.  Extraocular motions are normal.     CN V   Facial sensation intact.   Right corneal reflex: normal  Left corneal reflex: normal    CN VII   Facial expression full, symmetric.   Right facial weakness: none  Left facial weakness: none    CN VIII   Hearing: intact    CN IX, X   Palate: symmetric    CN XI   Right sternocleidomastoid strength: normal  Left sternocleidomastoid strength: normal    CN XII   Tongue: not atrophic  Tongue deviation: none    Motor Exam   Muscle bulk: normal  Right arm tone: normal  Left arm tone: normal  Right leg tone: normal  Left leg tone: normal    Strength   Strength 5/5 except as noted.     Sensory Exam   Light touch normal.     Gait, Coordination, and Reflexes     Gait  Gait: normal    Coordination   Finger to nose coordination: normal  Heel to shin coordination: normal    Reflexes   Right brachioradialis: 2+  Left brachioradialis: 2+  Right biceps: 2+  Left biceps: 2+  Right triceps: 2+  Left triceps: 2+  Right patellar: 2+  Left patellar: 2+  Right achilles: 2+  Left achilles: 2+  Right : 2+  Left : 2+          Assessment/Plan   Independent Review of Radiographic Studies:      I  personally reviewed the images from the following studies.    I reviewed the lumbar MRI done on 3/26/2021 which shows multilevel disc degeneration.  The most significant finding is at L2-L3 in which there is a broad based disc osteophyte complex with some associated broad-based herniation coming into contact with the L2 root on the left.  Some endplate edema at that level as well.  Agree with the report.        Medical Decision Making:      He is back to his baseline so there is not much to do but I did suggest sending him to therapy to work on core strengthening and to set up a home exercise program.  I did tell him to look into starting some yoga or Pilates as a means to strengthen his core muscles in addition to his walking program.  We will keep it open-ended.  If symptoms worsen again or he has an episode of severe back pain or develops sciatica, he will let us know.      Diagnoses and all orders for this visit:    1. Acute low back pain due to spinal disorder (Primary)  -     Ambulatory Referral to Physical Therapy Evaluate and treat    2. DDD (degenerative disc disease), lumbar  -     Ambulatory Referral to Physical Therapy Evaluate and treat    3. Herniated lumbar intervertebral disc  -     Ambulatory Referral to Physical Therapy Evaluate and treat      Return if symptoms worsen or fail to improve.

## 2021-09-15 ENCOUNTER — OFFICE VISIT (OUTPATIENT)
Dept: NEUROSURGERY | Facility: CLINIC | Age: 71
End: 2021-09-15

## 2021-09-15 VITALS
HEIGHT: 69 IN | DIASTOLIC BLOOD PRESSURE: 68 MMHG | WEIGHT: 184.2 LBS | TEMPERATURE: 98.2 F | SYSTOLIC BLOOD PRESSURE: 102 MMHG | BODY MASS INDEX: 27.28 KG/M2

## 2021-09-15 DIAGNOSIS — M51.26 HERNIATED LUMBAR INTERVERTEBRAL DISC: ICD-10-CM

## 2021-09-15 DIAGNOSIS — M51.36 DDD (DEGENERATIVE DISC DISEASE), LUMBAR: ICD-10-CM

## 2021-09-15 DIAGNOSIS — M53.9 ACUTE LOW BACK PAIN DUE TO SPINAL DISORDER: Primary | ICD-10-CM

## 2021-09-15 DIAGNOSIS — M54.50 ACUTE LOW BACK PAIN DUE TO SPINAL DISORDER: Primary | ICD-10-CM

## 2021-09-15 PROBLEM — M51.369 DDD (DEGENERATIVE DISC DISEASE), LUMBAR: Status: ACTIVE | Noted: 2021-09-15

## 2021-09-15 PROCEDURE — 99204 OFFICE O/P NEW MOD 45 MIN: CPT | Performed by: NEUROLOGICAL SURGERY

## 2021-09-16 ENCOUNTER — HOSPITAL ENCOUNTER (OUTPATIENT)
Dept: GENERAL RADIOLOGY | Facility: HOSPITAL | Age: 71
Discharge: HOME | End: 2021-09-16
Admitting: INTERNAL MEDICINE

## 2021-09-16 DIAGNOSIS — Z80.0 FAMILY HISTORY OF COLON CANCER: ICD-10-CM

## 2021-09-16 DIAGNOSIS — R19.7 DIARRHEA, UNSPECIFIED TYPE: ICD-10-CM

## 2021-09-16 DIAGNOSIS — K90.9 STEATORRHEA: ICD-10-CM

## 2021-09-16 DIAGNOSIS — K63.5 POLYP OF COLON, UNSPECIFIED PART OF COLON, UNSPECIFIED TYPE: ICD-10-CM

## 2021-09-16 PROCEDURE — 74250 X-RAY XM SM INT 1CNTRST STD: CPT

## 2021-09-16 PROCEDURE — A9270 NON-COVERED ITEM OR SERVICE: HCPCS | Performed by: INTERNAL MEDICINE

## 2021-09-16 PROCEDURE — 63710000001 BARIUM SULFATE 96 % RECONSTITUTED SUSPENSION: Performed by: INTERNAL MEDICINE

## 2021-09-16 RX ADMIN — BARIUM SULFATE 240 ML: 960 POWDER, FOR SUSPENSION ORAL at 09:51

## 2021-09-16 NOTE — PROGRESS NOTES
09/16/21       Tell him that the small bowel follow-through showed a normal small bowel but a moderately large amount of stool in the colon.       Have the pancreatic enzymes worked to decrease his stool frequency?       Has he been scheduled to see one of the pulmonary doctors about his chronic cough?       Please fax a copy of this report to his PCP.  Rojas. kjh

## 2021-09-17 ENCOUNTER — PATIENT ROUNDING (BHMG ONLY) (OUTPATIENT)
Dept: NEUROSURGERY | Facility: CLINIC | Age: 71
End: 2021-09-17

## 2021-09-17 NOTE — PROGRESS NOTES
September 17, 2021    Hello, may I speak with Paul Sterling?    My name is Chela Stonelin, Clinical Coordinator     I am  with [facility]  [address] [place] [id number] [phone].    Before we get started may I verify your date of birth? 1950    I am calling to officially welcome you to our practice and ask about your recent visit. Is this a good time to talk? no    Tell me about your visit with us. What things went well?  Patient was unable to answer.  I left message for patient welcoming them to the practice and to call the office with any questions or concerns.          We're always looking for ways to make our patients' experiences even better. Do you have recommendations on ways we may improve? N/A    Overall were you satisfied with your first visit to our practice? N/A     I appreciate you taking the time to speak with me today. Is there anything else I can do for you? N/A    Thank you, and have a great day.

## 2021-09-22 ENCOUNTER — TELEPHONE (OUTPATIENT)
Dept: GASTROENTEROLOGY | Facility: CLINIC | Age: 71
End: 2021-09-22

## 2021-09-22 NOTE — TELEPHONE ENCOUNTER
----- Message from Klaus Omalley MD sent at 9/16/2021  6:56 PM EDT -----  09/16/21       Tell him that the small bowel follow-through showed a normal small bowel but a moderately large amount of stool in the colon.       Have the pancreatic enzymes worked to decrease his stool frequency?       Has he been scheduled to see one of the pulmonary doctors about his chronic cough?       Please fax a copy of this report to his PCP.  Thx. kjh

## 2021-09-29 ENCOUNTER — OFFICE VISIT (OUTPATIENT)
Dept: GASTROENTEROLOGY | Facility: CLINIC | Age: 71
End: 2021-09-29

## 2021-09-29 VITALS — TEMPERATURE: 96.9 F | HEIGHT: 69 IN | WEIGHT: 184.2 LBS | BODY MASS INDEX: 27.28 KG/M2

## 2021-09-29 DIAGNOSIS — Z87.19 HISTORY OF BARRETT'S ESOPHAGUS: ICD-10-CM

## 2021-09-29 DIAGNOSIS — Z86.010 HISTORY OF COLON POLYPS: ICD-10-CM

## 2021-09-29 DIAGNOSIS — R05.3 CHRONIC COUGH: ICD-10-CM

## 2021-09-29 DIAGNOSIS — K59.00 CONSTIPATION, UNSPECIFIED CONSTIPATION TYPE: ICD-10-CM

## 2021-09-29 DIAGNOSIS — K21.9 GASTROESOPHAGEAL REFLUX DISEASE WITHOUT ESOPHAGITIS: Primary | ICD-10-CM

## 2021-09-29 PROCEDURE — 99214 OFFICE O/P EST MOD 30 MIN: CPT | Performed by: NURSE PRACTITIONER

## 2021-09-29 NOTE — PROGRESS NOTES
Chief Complaint   Patient presents with   • Follow-up       Paul Sterling is a  71 y.o. male here for a follow up visit for constipation.    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  He is a patient of Dr. Omalley.  He was last seen in the office on 8/19/2021.  He is new to me today.  He recently underwent a small bowel follow-through that showed moderate to large stool burden.  Otherwise normal.  He does have a history of chronic constipation and admits he has been doing better he thought with daily fiber supplementation.  Unfortunately about a week ago when he was on vacation he took a lot more fiber than he usually does each day while on vacation and ended up by the end of the week with diarrhea.  So he is cut back since on the amount of fiber supplementation he has been taking.  He tells me the pancreatic enzymes that Dr. Omalley had him try did not seem to help at all.  He tells me he feels like what has helped the most is he has drastically changed his diet and cut out all fast food and is also decreasing the amount of alcohol intake.  He tells me this does seem to be helping.  He does have a history of GERD/gastritis/small hiatal hernia/Noonan's esophagus without dysplasia and admits he still having issues with breakthrough reflux and a chronic cough.  He was supposed to be referred to pulmonology for this cough but he is yet to hear back from anyone.  He does normally take pantoprazole every morning but he feels like by late afternoon and evening he is having more breakthrough reflux symptoms.  He does have history of cholecystectomy.  Last EGD and colonoscopy was on 7/30/2021.  EGD did show gastritis.  Path was negative.  Colonoscopy showed multiple small polyps in the colon but unfortunately with a fair prep visualization was not great.  Recommend repeat colonoscopy in 1 year for better evaluation.  He did have a CT scan of the abdomen and pelvis for further  evaluation done recently which did show a small hiatal hernia and constipation but was otherwise unremarkable.  He denies any dysphagia, nausea and vomiting, rectal bleeding or melena.  He admits his appetite is okay and his weight has dropped 5 pounds since July of this year.  He does feel like the frequency of his bowel movements have lessened since his dietary changes.  Now he reports maybe 2-3 soft stools a day.  Past Medical History:   Diagnosis Date   • Arthritis    • Noonan's esophagus    • Blood in stool    • Colon polyps    • GERD (gastroesophageal reflux disease)    • History of colonic polyps    • Hyperlipidemia    • Hypertension        Past Surgical History:   Procedure Laterality Date   • CHOLECYSTECTOMY N/A 2010    Dr. Angel Gordon, [facility]   • COLONOSCOPY N/A 6/20/2018    Procedure: COLONOSCOPY to ccecum with cold bx polypectomy;  Surgeon: Stuart Hernandez MD;  Location: [facility] ENDOSCOPY;  Service: Gastroenterology   • COLONOSCOPY N/A 7/30/2021    Procedure: COLONOSCOPY  TO CECUM WITH COLD BIOPSIES AND HOT SNARE POLYPECTOMY AND SALINE LIFT INJECTION AND ORISE GEL;  Surgeon: Klaus Omalley MD;  Location: [facility] ENDOSCOPY;  Service: Gastroenterology;  Laterality: N/A;  PRE:FAMILY HISTORY OF COLON CANCER  POST: POLYPS, FAIR PREP   • ENDOSCOPY N/A 6/20/2018    Procedure: ESOPHAGOGASTRODUODENOSCOPY with bx;  Surgeon: Stuart Hernandez MD;  Location: [facility] ENDOSCOPY;  Service: Gastroenterology   • ENDOSCOPY N/A 7/30/2021    Procedure: ESOPHAGOGASTRODUODENOSCOPY WITH COLD BIOPSIES;  Surgeon: Klaus Omalley MD;  Location: [facility] ENDOSCOPY;  Service: Gastroenterology;  Laterality: N/A;  PRE: DYSPEPSIAPOST:GASTRITIS   • ENDOSCOPY AND COLONOSCOPY N/A 2015    Dr. Angel Gordon, [facility]   • INGUINAL HERNIA REPAIR Bilateral 2010, 2014    Dr. Angel Gordon, [facility]   • NISSEN FUNDOPLICATION LAPAROSCOPIC N/A    • UMBILICAL HERNIA REPAIR N/A 2010    Dr. Angel Gordon [facility]   • UPPER GASTROINTESTINAL ENDOSCOPY  07/30/2021        Scheduled Meds:    Continuous Infusions:No current facility-administered medications for this visit.      PRN Meds:.    No Known Allergies    Social History     Socioeconomic History   • Marital status:      Spouse name: Not on file   • Number of children: Not on file   • Years of education: Not on file   • Highest education level: Not on file   Tobacco Use   • Smoking status: Never Smoker   • Smokeless tobacco: Never Used   Vaping Use   • Vaping Use: Never used   Substance and Sexual Activity   • Alcohol use: Yes     Alcohol/week: 20.0 standard drinks     Types: 20 Cans of beer per week     Comment: weekly   • Drug use: No   • Sexual activity: Defer       Family History   Problem Relation Age of Onset   • Parkinsonism Mother    • Colon cancer Mother    • Colon polyps Mother    • Tremor Father    • Coronary artery disease Father    • Malig Hyperthermia Neg Hx        Review of Systems   Constitutional: Negative for appetite change, chills, diaphoresis, fatigue, fever and unexpected weight change.   HENT: Negative for nosebleeds, postnasal drip, sore throat, trouble swallowing and voice change.    Respiratory: Positive for cough. Negative for choking, chest tightness, shortness of breath, wheezing and stridor.    Cardiovascular: Negative for chest pain, palpitations and leg swelling.   Gastrointestinal: Negative for abdominal distention, abdominal pain, anal bleeding, blood in stool, constipation, diarrhea, nausea, rectal pain and vomiting.   Endocrine: Negative for polydipsia, polyphagia and polyuria.   Musculoskeletal: Negative for gait problem.   Skin: Negative for rash and wound.   Allergic/Immunologic: Negative for food allergies.   Neurological: Negative for dizziness, speech difficulty and light-headedness.   Psychiatric/Behavioral: Negative for confusion, self-injury, sleep disturbance and suicidal ideas.       Vitals:    09/29/21 0859   Temp: 96.9 °F (36.1 °C)       Physical Exam  Constitutional:        General: He is not in acute distress.     Appearance: He is well-developed. He is not ill-appearing.   HENT:      Head: Normocephalic.   Eyes:      Pupils: Pupils are equal, round, and reactive to light.   Cardiovascular:      Rate and Rhythm: Normal rate and regular rhythm.      Heart sounds: Normal heart sounds.   Pulmonary:      Effort: Pulmonary effort is normal.      Breath sounds: Normal breath sounds.   Abdominal:      General: Bowel sounds are normal. There is no distension.      Palpations: Abdomen is soft. There is no mass.      Tenderness: There is no abdominal tenderness. There is no guarding or rebound.      Hernia: No hernia is present.   Musculoskeletal:         General: Normal range of motion.   Skin:     General: Skin is warm and dry.   Neurological:      Mental Status: He is alert and oriented to person, place, and time.   Psychiatric:         Speech: Speech normal.         Behavior: Behavior normal.         Judgment: Judgment normal.         No radiology results for the last 7 days     Diagnoses and all orders for this visit:    1. Gastroesophageal reflux disease without esophagitis (Primary)  Overview:  Added automatically from request for surgery [id number]      2. History of Noonan's esophagus  Overview:  Added automatically from request for surgery [id number]      3. Constipation, unspecified constipation type    4. History of colon polyps    5. Chronic cough  -     Ambulatory Referral to Pulmonology     Reviewed most recent EGD and colonoscopy results with him today.  I also went over his most recent labs and imaging studies.  Bowels do seem to be moving better since he has changed his diet and cut down his alcohol.  I do think increasing the fiber will help he just needs to make sure not to take too much of it as it can definitely cause looser stools.  GERD seems well controlled during the early part of the day on the pantoprazole 40 mg however he is having breakthrough reflux in the  evenings.  His chronic cough could definitely be reflux related since he is having breakthrough symptoms.  Recommend he start on Pepcid 20 mg before dinner.  Continue GERD precautions.  I will go ahead make referral to pulmonology for chronic cough for further evaluation.  Patient is agreeable to the referral.  Patient to call the office next week with an update.  Patient to follow-up with me in 3 to 4 months.  Patient is agreeable to the plan.

## 2021-10-07 NOTE — TELEPHONE ENCOUNTER
Rx Refill Note  Requested Prescriptions     Pending Prescriptions Disp Refills   • losartan (COZAAR) 25 MG tablet [Pharmacy Med Name: LOSARTAN POTASSIUM 25 MG TAB] 90 tablet 1     Sig: TAKE 1 TABLET BY MOUTH EVERY DAY      Last office visit with prescribing clinician: 2/9/2021      Next office visit with prescribing clinician: 2/15/2022       {TIP  Please add Last Relevant Lab Date if appropriate: 1/8/21     Toni Jackson MA  10/07/21, 13:24 EDT

## 2021-10-08 RX ORDER — LOSARTAN POTASSIUM 25 MG/1
TABLET ORAL
Qty: 90 TABLET | Refills: 1 | Status: SHIPPED | OUTPATIENT
Start: 2021-10-08 | End: 2022-04-01

## 2021-10-26 ENCOUNTER — TELEPHONE (OUTPATIENT)
Dept: GASTROENTEROLOGY | Facility: CLINIC | Age: 71
End: 2021-10-26

## 2022-02-07 DIAGNOSIS — I10 HYPERTENSION, UNSPECIFIED TYPE: ICD-10-CM

## 2022-02-07 DIAGNOSIS — E78.5 HYPERLIPIDEMIA, UNSPECIFIED HYPERLIPIDEMIA TYPE: Primary | ICD-10-CM

## 2022-02-07 DIAGNOSIS — R03.0 ELEVATED BLOOD-PRESSURE READING WITHOUT DIAGNOSIS OF HYPERTENSION: ICD-10-CM

## 2022-02-15 ENCOUNTER — OFFICE VISIT (OUTPATIENT)
Dept: FAMILY MEDICINE CLINIC | Facility: CLINIC | Age: 72
End: 2022-02-15

## 2022-02-15 VITALS
RESPIRATION RATE: 16 BRPM | SYSTOLIC BLOOD PRESSURE: 124 MMHG | HEART RATE: 68 BPM | BODY MASS INDEX: 26.82 KG/M2 | HEIGHT: 69 IN | TEMPERATURE: 96.4 F | DIASTOLIC BLOOD PRESSURE: 70 MMHG | OXYGEN SATURATION: 98 % | WEIGHT: 181.1 LBS

## 2022-02-15 DIAGNOSIS — N32.81 OVERACTIVE BLADDER: ICD-10-CM

## 2022-02-15 DIAGNOSIS — R03.0 ELEVATED BLOOD-PRESSURE READING WITHOUT DIAGNOSIS OF HYPERTENSION: ICD-10-CM

## 2022-02-15 DIAGNOSIS — G25.0 ESSENTIAL TREMOR: Primary | ICD-10-CM

## 2022-02-15 DIAGNOSIS — E78.5 HYPERLIPIDEMIA, UNSPECIFIED HYPERLIPIDEMIA TYPE: ICD-10-CM

## 2022-02-15 DIAGNOSIS — Z12.5 SCREENING PSA (PROSTATE SPECIFIC ANTIGEN): Primary | ICD-10-CM

## 2022-02-15 PROCEDURE — 99213 OFFICE O/P EST LOW 20 MIN: CPT | Performed by: INTERNAL MEDICINE

## 2022-02-15 PROCEDURE — G0439 PPPS, SUBSEQ VISIT: HCPCS | Performed by: INTERNAL MEDICINE

## 2022-02-15 PROCEDURE — 1170F FXNL STATUS ASSESSED: CPT | Performed by: INTERNAL MEDICINE

## 2022-02-15 PROCEDURE — 1160F RVW MEDS BY RX/DR IN RCRD: CPT | Performed by: INTERNAL MEDICINE

## 2022-02-15 RX ORDER — POLYETHYLENE GLYCOL 400 AND PROPYLENE GLYCOL 4; 3 MG/ML; MG/ML
1 SOLUTION/ DROPS OPHTHALMIC
COMMUNITY

## 2022-02-15 RX ORDER — DESONIDE 0.5 MG/G
1 CREAM TOPICAL 2 TIMES DAILY PRN
COMMUNITY
Start: 2021-12-03

## 2022-02-15 RX ORDER — SILDENAFIL CITRATE 20 MG/1
TABLET ORAL
COMMUNITY
Start: 2000-02-08 | End: 2022-10-14

## 2022-02-15 RX ORDER — CLONAZEPAM 0.5 MG/1
0.5 TABLET ORAL DAILY PRN
Qty: 90 TABLET | Refills: 0 | Status: SHIPPED | OUTPATIENT
Start: 2022-02-15

## 2022-02-15 NOTE — PROGRESS NOTES
The ABCs of the Annual Wellness Visit  Subsequent Medicare Wellness Visit    Chief Complaint   Patient presents with   • Annual Exam      AWV       Subjective    History of Present Illness:  Paul Sterling is a 71 y.o. male who presents for a Subsequent Medicare Wellness Visit.    The following portions of the patient's history were reviewed and   updated as appropriate: allergies, current medications, past family history, past medical history, past social history, past surgical history and problem list.    Compared to one year ago, the patient feels his physical   health is better.    Compared to one year ago, the patient feels his mental   health is the same.    Recent Hospitalizations:  He was not admitted to the hospital during the last year.       Current Medical Providers:  Patient Care Team:  Ofelia Bradford MD as PCP - General (Internal Medicine)    Outpatient Medications Prior to Visit   Medication Sig Dispense Refill   • atorvastatin (LIPITOR) 10 MG tablet TAKE 1 TABLET BY MOUTH EVERY DAY 90 tablet 1   • clonazePAM (KlonoPIN) 0.5 MG tablet Take 1 tablet by mouth Daily As Needed (HAND TREMORS). 90 tablet 0   • desonide (DESOWEN) 0.05 % cream      • Glucosamine-Chondroit-Vit C-Mn (GLUCOSAMINE CHONDR 1500 COMPLX PO) Take 1 tablet by mouth Daily.     • hydrocortisone 1 % cream Apply 1 application topically to the appropriate area as directed As Needed.     • hydrocortisone 2.5 % cream Apply 1 application topically to the appropriate area as directed As Needed.  3   • Loratadine (CLARITIN PO) Take 30 mg by mouth Daily.     • losartan (COZAAR) 25 MG tablet TAKE 1 TABLET BY MOUTH EVERY DAY 90 tablet 1   • Mirabegron ER (Myrbetriq) 50 MG tablet sustained-release 24 hour 24 hr tablet Take 50 mg by mouth Daily. 90 tablet 1   • Multiple Vitamins-Minerals (PRESERVISION AREDS 2 PO) Take 2 capsules by mouth Daily.     • pantoprazole (PROTONIX) 40 MG EC tablet Take 1 tablet by mouth Daily. 30 tablet 11   •  polyethyl glycol-propyl glycol (Systane) 0.4-0.3 % solution ophthalmic solution (artificial tears)      • sildenafil (REVATIO) 20 MG tablet      • triamcinolone (KENALOG) 0.1 % ointment Apply 1 application topically to the appropriate area as directed As Needed.     • triamcinolone (NASACORT AQ) 55 MCG/ACT nasal inhaler 4 Squirts into each nostril daily.     • hydrOXYzine (ATARAX) 25 MG tablet Take 25 mg by mouth 3 (Three) Times a Day As Needed for Itching.     • psyllium (METAMUCIL) 58.6 % packet Take 1 packet by mouth Daily.       No facility-administered medications prior to visit.       No opioid medication identified on active medication list. I have reviewed chart for other potential  high risk medication/s and harmful drug interactions in the elderly.          Aspirin is not on active medication list.  Aspirin use is not indicated based on review of current medical condition/s. Risk of harm outweighs potential benefits.  .    Patient Active Problem List   Diagnosis   • Well adult exam   • Rash   • Tremor of both hands   • Insomnia   • Encounter for immunization   • Hyperlipidemia   • Elevated blood-pressure reading without diagnosis of hypertension   • Rectal bleeding   • Family history of colon cancer   • Family history of colonic polyps   • Colon cancer screening   • History of Noonan's esophagus   • Gastroesophageal reflux disease   • Polyp of colon   • Cough   • Diarrhea   • Herniated lumbar intervertebral disc   • DDD (degenerative disc disease), lumbar   • Acute low back pain due to spinal disorder     Advance Care Planning  Advance Directive is not on file.  ACP discussion was held with the patient during this visit. Patient has an advance directive (not in EMR), copy requested. Patient does not have an advance directive, information provided.          Objective    Vitals:    02/15/22 0830   BP: 124/70   Pulse: 68   Resp: 16   Temp: 96.4 °F (35.8 °C)   TempSrc: Temporal   SpO2: 98%   Weight: 82.1 kg  "(181 lb 1.6 oz)   Height: 175.3 cm (69\")   PainSc: 0-No pain     BMI Readings from Last 1 Encounters:   02/15/22 26.74 kg/m²   BMI is above normal parameters. Recommendations include: none needed    Does the patient have evidence of cognitive impairment? No    Physical Exam  Lab Results   Component Value Date    CHLPL 148 02/10/2022    TRIG 61 02/10/2022    HDL 51 02/10/2022    LDL 84 02/10/2022    VLDL 13 02/10/2022            HEALTH RISK ASSESSMENT    Smoking Status:  Social History     Tobacco Use   Smoking Status Never Smoker   Smokeless Tobacco Never Used     Alcohol Consumption:  Social History     Substance and Sexual Activity   Alcohol Use Yes   • Alcohol/week: 20.0 standard drinks   • Types: 20 Cans of beer per week    Comment: weekly     Fall Risk Screen:    [facility] Fall Risk Assessment was completed, and patient is at LOW risk for falls.Assessment completed on:2/15/2022    Depression Screening:  PHQ-2/PHQ-9 Depression Screening 2/15/2022   Little interest or pleasure in doing things 0   Feeling down, depressed, or hopeless 0   Total Score 0       Health Habits and Functional and Cognitive Screening:  Functional & Cognitive Status 2/15/2022   Do you have difficulty preparing food and eating? No   Do you have difficulty bathing yourself, getting dressed or grooming yourself? No   Do you have difficulty using the toilet? No   Do you have difficulty moving around from place to place? No   Do you have trouble with steps or getting out of a bed or a chair? No   Current Diet Well Balanced Diet   Dental Exam Up to date   Eye Exam Up to date   Exercise (times per week) 7 times per week   Current Exercises Include Walking   Current Exercise Activities Include -   Do you need help using the phone?  No   Are you deaf or do you have serious difficulty hearing?  No   Do you need help with transportation? No   Do you need help shopping? No   Do you need help preparing meals?  No   Do you need help with housework?  No "   Do you need help with laundry? No   Do you need help taking your medications? No   Do you need help managing money? No   Do you ever drive or ride in a car without wearing a seat belt? No   Have you felt unusual stress, anger or loneliness in the last month? No   Who do you live with? Spouse   If you need help, do you have trouble finding someone available to you? No   Have you been bothered in the last four weeks by sexual problems? No   Do you have difficulty concentrating, remembering or making decisions? No       Age-appropriate Screening Schedule:  Refer to the list below for future screening recommendations based on patient's age, sex and/or medical conditions. Orders for these recommended tests are listed in the plan section. The patient has been provided with a written plan.    Health Maintenance   Topic Date Due   • TDAP/TD VACCINES (1 - Tdap) 03/23/2018   • ZOSTER VACCINE (2 of 2) 02/18/2022 (Originally 7/27/2012)   • LIPID PANEL  02/10/2023   • INFLUENZA VACCINE  Completed              Assessment/Plan   CMS Preventative Services Quick Reference  Risk Factors Identified During Encounter  Immunizations Discussed/Encouraged (specific Immunizations; he is up to date  The above risks/problems have been discussed with the patient.  Follow up actions/plans if indicated are seen below in the Assessment/Plan Section.  Pertinent information has been shared with the patient in the After Visit Summary.    There are no diagnoses linked to this encounter.    Follow Up:   No follow-ups on file.     An After Visit Summary and PPPS were made available to the patient.               Sees eye doc 2x/year  Living will requested  Labs reviewed with patient.  Add on PSA

## 2022-02-15 NOTE — PROGRESS NOTES
"Chief Complaint  Annual Exam ( AWV )    Subjective          Paul Sterling presents to [facility]  History of Present Illness  Here for AWV and f/u on meds.   HL on lipitor, OAB on myrbetriq, ED on silendafil prn and HTN on losartan.  Takes clonazepam for essential tremor prn and needs refill.  Last refill was 2019.  Labs reviewed today as well.    CN 2021   Objective   Vital Signs:   /70   Pulse 68   Temp 96.4 °F (35.8 °C) (Temporal)   Resp 16   Ht 175.3 cm (69\")   Wt 82.1 kg (181 lb 1.6 oz)   SpO2 98%   BMI 26.74 kg/m²     Physical Exam  Vitals and nursing note reviewed.   Constitutional:       Appearance: Normal appearance. He is well-developed.   HENT:      Head: Normocephalic and atraumatic.      Right Ear: External ear normal.      Left Ear: External ear normal.   Eyes:      Extraocular Movements: Extraocular movements intact.      Conjunctiva/sclera: Conjunctivae normal.   Neck:      Vascular: No carotid bruit.   Cardiovascular:      Rate and Rhythm: Normal rate and regular rhythm.      Heart sounds: Normal heart sounds.      Comments: No bruits  Pulmonary:      Effort: Pulmonary effort is normal. No respiratory distress.      Breath sounds: Normal breath sounds. No wheezing or rales.   Abdominal:      General: Bowel sounds are normal. There is no distension.      Palpations: Abdomen is soft. There is no mass.      Tenderness: There is no abdominal tenderness.   Musculoskeletal:      Cervical back: Neck supple.   Lymphadenopathy:      Cervical: No cervical adenopathy.   Skin:     General: Skin is warm.   Neurological:      General: No focal deficit present.      Mental Status: He is alert and oriented to person, place, and time.   Psychiatric:         Mood and Affect: Mood normal.         Behavior: Behavior normal.         Thought Content: Thought content normal.         Judgment: Judgment normal.        Result Review :                CBC & Differential " (02/10/2022 08:03)  Comprehensive Metabolic Panel (02/10/2022 08:03)  Lipid Panel With LDL / HDL Ratio (02/10/2022 08:03)  TSH (02/10/2022 08:03)  T4, Free (02/10/2022 08:03)    Assessment and Plan    Diagnoses and all orders for this visit:    1. Essential tremor (Primary)  -     clonazePAM (KlonoPIN) 0.5 MG tablet; Take 1 tablet by mouth Daily As Needed (HAND TREMORS).  Dispense: 90 tablet; Refill: 0    2. Hyperlipidemia, unspecified hyperlipidemia type    3. Overactive bladder    4. Elevated blood-pressure reading without diagnosis of hypertension        Follow Up   No follow-ups on file.  Patient was given instructions and counseling regarding his condition or for health maintenance advice. Please see specific information pulled into the AVS if appropriate.     Labs reviewed and look great.  No changes to medications.  He doesn't need refills yet.   All vaccinations are UTD.  Will add PSA to labs.  CN up to date.    Living will requested  Eye exam is 2x/yr.

## 2022-03-03 ENCOUNTER — OFFICE VISIT (OUTPATIENT)
Dept: SURGERY | Facility: CLINIC | Age: 72
End: 2022-03-03

## 2022-03-03 VITALS — BODY MASS INDEX: 26.57 KG/M2 | WEIGHT: 179.4 LBS | HEIGHT: 69 IN

## 2022-03-03 DIAGNOSIS — K42.9 UMBILICAL HERNIA WITHOUT OBSTRUCTION AND WITHOUT GANGRENE: Primary | ICD-10-CM

## 2022-03-03 PROCEDURE — 99213 OFFICE O/P EST LOW 20 MIN: CPT | Performed by: PHYSICIAN ASSISTANT

## 2022-03-03 RX ORDER — PANTOPRAZOLE SODIUM 20 MG/1
2 TABLET, DELAYED RELEASE ORAL
COMMUNITY
Start: 2021-09-01 | End: 2022-06-20 | Stop reason: ALTCHOICE

## 2022-03-03 NOTE — PROGRESS NOTES
"CC:    Recurrent umbilical hernia    HPI:    This is a 72-year-old gentleman returning to the office today after having last seen Dr. Gordon in December 2020.  He presents today with a bulge located just to the side of his previous umbilical hernia repair.  All this bulge only causes a minimal amount of discomfort he does feel as if it has been enlarging and is becoming more of a nuisance and would like to have it repaired.  He denies having nausea, vomiting, abdominal distention, abdominal pain or difficulty with bowel habits.  He did have a CT scan of the abdomen pelvis performed in August of 2021 which showed a small recurrent umbilical hernia.    PMH:    Reviewed and reconciled in epic; significant for history of Noonan's esophagus, GERD, diarrhea, history of colon polyps, hypertension    PAST ABDOMINAL SURGERIES:  EGD and colonoscopy July 2021, umbilical hernia repair 2010, cholecystectomy 2010, bilateral inguinal hernia repair 2010 2014, Nissen fundoplication    PAST RELEVANT  SURGERIES:  None    SH:  Does not smoke, does consume alcohol    FMH:  Mother with a history of colon cancer    ALLERGIES:   No known drug allergies    MEDICATIONS:  Reviewed and reconciled in Epic.    ROS:    All other systems reviewed and negative other than presenting complaints.    PE:  Vitals: Weight 179 height 69\" BMI 26.5  Constitutional: Well-nourished, well-developed male in no acute distress  HEENT: Normocephalic, atraumatic, sclera anicteric, normal conjunctiva  Pulmonary: Clear to auscultation bilaterally, normal respiratory effort, no wheezes, rales or rhonchi noted  Cardiac: Regular rate and rhythm, no rubs, gallops or rubs noted  Abdomen: Small easily reducible nontender recurrent umbilical hernia, abdomen is soft, nontender, nondistended, normal bowel sounds are present    CLINICAL SUMMARY (A/P):    This is a 72-year-old gentleman presenting with a recurrent umbilical hernia after repair without mesh in 2010.  Dr." Heidi and JANIE discussed surgical intervention in the form of an open umbilical hernia repair and he understands the nature of the procedure and the risks including but not limited to bleeding, infection, use of mesh, and recurrence.  All questions were answered at the time of this visit and they were willing to proceed with all recommendations.      Jl Cox PA-C

## 2022-03-08 RX ORDER — ATORVASTATIN CALCIUM 10 MG/1
TABLET, FILM COATED ORAL
Qty: 90 TABLET | Refills: 1 | Status: SHIPPED | OUTPATIENT
Start: 2022-03-08 | End: 2022-10-08

## 2022-03-08 NOTE — TELEPHONE ENCOUNTER
Rx Refill Note  Requested Prescriptions     Pending Prescriptions Disp Refills   • atorvastatin (LIPITOR) 10 MG tablet [Pharmacy Med Name: ATORVASTATIN 10 MG TABLET] 90 tablet 1     Sig: TAKE 1 TABLET BY MOUTH EVERY DAY      Last office visit with prescribing clinician: 2/15/2022      Next office visit with prescribing clinician: Visit date not found       {TIP  Please add Last Relevant Lab Date if appropriate:  2/10/2      Toni Jackson MA  03/08/22, 10:41 EST

## 2022-04-01 RX ORDER — LOSARTAN POTASSIUM 25 MG/1
TABLET ORAL
Qty: 90 TABLET | Refills: 1 | Status: SHIPPED | OUTPATIENT
Start: 2022-04-01 | End: 2022-11-04 | Stop reason: HOSPADM

## 2022-04-01 NOTE — TELEPHONE ENCOUNTER
Rx Refill Note  Requested Prescriptions     Pending Prescriptions Disp Refills   • losartan (COZAAR) 25 MG tablet [Pharmacy Med Name: LOSARTAN POTASSIUM 25 MG TAB] 90 tablet 1     Sig: TAKE 1 TABLET BY MOUTH EVERY DAY      Last office visit with prescribing clinician: 2/15/2022      Next office visit with prescribing clinician: 4/13/2023            Toni Jackson MA  04/01/22, 09:13 EDT

## 2022-04-04 ENCOUNTER — TELEPHONE (OUTPATIENT)
Dept: SURGERY | Facility: CLINIC | Age: 72
End: 2022-04-04

## 2022-04-06 ENCOUNTER — APPOINTMENT (OUTPATIENT)
Dept: PREADMISSION TESTING | Facility: HOSPITAL | Age: 72
End: 2022-04-06

## 2022-06-18 DIAGNOSIS — K21.9 GASTROESOPHAGEAL REFLUX DISEASE, UNSPECIFIED WHETHER ESOPHAGITIS PRESENT: ICD-10-CM

## 2022-06-20 RX ORDER — PANTOPRAZOLE SODIUM 40 MG/1
TABLET, DELAYED RELEASE ORAL
Qty: 90 TABLET | Refills: 0 | Status: SHIPPED | OUTPATIENT
Start: 2022-06-20 | End: 2022-10-03 | Stop reason: SDUPTHER

## 2022-09-25 DIAGNOSIS — K21.9 GASTROESOPHAGEAL REFLUX DISEASE, UNSPECIFIED WHETHER ESOPHAGITIS PRESENT: ICD-10-CM

## 2022-09-28 RX ORDER — PANTOPRAZOLE SODIUM 40 MG/1
TABLET, DELAYED RELEASE ORAL
Qty: 90 TABLET | Refills: 0 | OUTPATIENT
Start: 2022-09-28

## 2022-10-03 DIAGNOSIS — K21.9 GASTROESOPHAGEAL REFLUX DISEASE, UNSPECIFIED WHETHER ESOPHAGITIS PRESENT: ICD-10-CM

## 2022-10-03 RX ORDER — PANTOPRAZOLE SODIUM 40 MG/1
40 TABLET, DELAYED RELEASE ORAL DAILY
Qty: 90 TABLET | Refills: 1 | Status: SHIPPED | OUTPATIENT
Start: 2022-10-03

## 2022-10-07 NOTE — TELEPHONE ENCOUNTER
Rx Refill Note  Requested Prescriptions     Pending Prescriptions Disp Refills   • atorvastatin (LIPITOR) 10 MG tablet [Pharmacy Med Name: ATORVASTATIN 10 MG TABLET] 90 tablet 1     Sig: TAKE 1 TABLET BY MOUTH EVERY DAY      Last office visit with prescribing clinician: 2/15/2022      Next office visit with prescribing clinician: 4/13/2023       {TIP  Please add Last Relevant Lab Date if appropriate: 2/10/2022    Nirali Arreola, PCT  10/07/22, 15:22 EDT

## 2022-10-08 RX ORDER — ATORVASTATIN CALCIUM 10 MG/1
TABLET, FILM COATED ORAL
Qty: 90 TABLET | Refills: 1 | Status: SHIPPED | OUTPATIENT
Start: 2022-10-08

## 2022-10-14 ENCOUNTER — PRE-ADMISSION TESTING (OUTPATIENT)
Dept: PREADMISSION TESTING | Facility: HOSPITAL | Age: 72
End: 2022-10-14

## 2022-10-14 VITALS
OXYGEN SATURATION: 100 % | TEMPERATURE: 97.5 F | DIASTOLIC BLOOD PRESSURE: 81 MMHG | BODY MASS INDEX: 26.66 KG/M2 | HEIGHT: 69 IN | WEIGHT: 180 LBS | RESPIRATION RATE: 16 BRPM | SYSTOLIC BLOOD PRESSURE: 158 MMHG

## 2022-10-14 LAB
ANION GAP SERPL CALCULATED.3IONS-SCNC: 11.6 MMOL/L (ref 5–15)
BUN SERPL-MCNC: 12 MG/DL (ref 8–23)
BUN/CREAT SERPL: 14.8 (ref 7–25)
CALCIUM SPEC-SCNC: 9 MG/DL (ref 8.6–10.5)
CHLORIDE SERPL-SCNC: 103 MMOL/L (ref 98–107)
CO2 SERPL-SCNC: 23.4 MMOL/L (ref 22–29)
CREAT SERPL-MCNC: 0.81 MG/DL (ref 0.76–1.27)
DEPRECATED RDW RBC AUTO: 41.5 FL (ref 37–54)
EGFRCR SERPLBLD CKD-EPI 2021: 94.3 ML/MIN/1.73
ERYTHROCYTE [DISTWIDTH] IN BLOOD BY AUTOMATED COUNT: 12.7 % (ref 12.3–15.4)
GLUCOSE SERPL-MCNC: 131 MG/DL (ref 65–99)
HCT VFR BLD AUTO: 42.3 % (ref 37.5–51)
HGB BLD-MCNC: 14.4 G/DL (ref 13–17.7)
MCH RBC QN AUTO: 29.9 PG (ref 26.6–33)
MCHC RBC AUTO-ENTMCNC: 34 G/DL (ref 31.5–35.7)
MCV RBC AUTO: 87.8 FL (ref 79–97)
PLATELET # BLD AUTO: 171 10*3/MM3 (ref 140–450)
PMV BLD AUTO: 12.8 FL (ref 6–12)
POTASSIUM SERPL-SCNC: 4.3 MMOL/L (ref 3.5–5.2)
QT INTERVAL: 405 MS
RBC # BLD AUTO: 4.82 10*6/MM3 (ref 4.14–5.8)
SODIUM SERPL-SCNC: 138 MMOL/L (ref 136–145)
WBC NRBC COR # BLD: 4.81 10*3/MM3 (ref 3.4–10.8)

## 2022-10-14 PROCEDURE — 80048 BASIC METABOLIC PNL TOTAL CA: CPT

## 2022-10-14 PROCEDURE — 36415 COLL VENOUS BLD VENIPUNCTURE: CPT

## 2022-10-14 PROCEDURE — 93005 ELECTROCARDIOGRAM TRACING: CPT

## 2022-10-14 PROCEDURE — 93010 ELECTROCARDIOGRAM REPORT: CPT | Performed by: INTERNAL MEDICINE

## 2022-10-14 PROCEDURE — 85027 COMPLETE CBC AUTOMATED: CPT

## 2022-10-14 RX ORDER — CHLORHEXIDINE GLUCONATE 500 MG/1
CLOTH TOPICAL
COMMUNITY
End: 2022-11-04 | Stop reason: HOSPADM

## 2022-10-14 NOTE — DISCHARGE INSTRUCTIONS
Take the following medications the morning of surgery: PROTONIX      If you are on prescription narcotic pain medication to control your pain you may also take that medication the morning of surgery.    General Instructions:  Do not eat solid food after midnight the night before surgery.  You may drink clear liquids day of surgery but must stop at least one hour before your hospital arrival time.  It is beneficial for you to have a clear drink that contains carbohydrates the day of surgery.  We suggest a 12 to 20 ounce bottle of Gatorade or Powerade for non-diabetic patients or a 12 to 20 ounce bottle of G2 or Powerade Zero for diabetic patients.     Clear liquids are liquids you can see through.  Nothing red in color.     Plain water                               Sports drinks  Sodas                                   Gelatin (Jell-O)  Fruit juices without pulp such as white grape juice and apple juice  Popsicles that contain no fruit or yogurt  Tea or coffee (no cream or milk added)  Gatorade / Powerade  G2 / Powerade Zero      Patients who avoid smoking, chewing tobacco and alcohol for 4 weeks prior to surgery have a reduced risk of post-operative complications.  Quit smoking as many days before surgery as you can.  Do not smoke, use chewing tobacco or drink alcohol the day of surgery.   If applicable bring your C-PAP/ BI-PAP machine.  Bring any papers given to you in the doctor’s office.  Wear clean comfortable clothes.  Do not wear contact lenses, false eyelashes or make-up.  Bring a case for your glasses.   Remove all piercings.  Leave jewelry and any other valuables at home  The Pre-Admission Testing nurse will instruct you to bring medications if unable to obtain an accurate list in Pre-Admission Testing.            Preventing a Surgical Site Infection:  For 2 to 3 days before surgery, avoid shaving with a razor because the razor can irritate skin and make it easier to develop an infection.    Any areas of  open skin can increase the risk of a post-operative wound infection by allowing bacteria to enter and travel throughout the body.  Notify your surgeon if you have any skin wounds / rashes even if it is not near the expected surgical site.  The area will need assessed to determine if surgery should be delayed until it is healed.  The night prior to surgery shower using a fresh bar of anti-bacterial soap (such as Dial) and clean washcloth.  Sleep in a clean bed with clean clothing.  Do not allow pets to sleep with you.  Shower on the morning of surgery using a fresh bar of anti-bacterial soap (such as Dial) and clean washcloth.  Dry with a clean towel and dress in clean clothing.  Ask your surgeon if you will be receiving antibiotics prior to surgery.  Make sure you, your family, and all healthcare providers clean their hands with soap and water or an alcohol based hand  before caring for you or your wound.    Day of surgery:  Your arrival time is approximately two hours before your scheduled surgery time.  Upon arrival, a Pre-op nurse and Anesthesiologist will review your health history, obtain vital signs, and answer questions you may have.  The only belongings needed at this time will be a list of your home medications and if applicable your C-PAP/BI-PAP machine.  A Pre-op nurse will start an IV and you may receive medication in preparation for surgery, including something to help you relax.     Please be aware that surgery does come with discomfort.  We want to make every effort to control your discomfort so please discuss any uncontrolled symptoms with your nurse.   Your doctor will most likely have prescribed pain medications.      If you are going home after surgery you will receive individualized written care instructions before being discharged.  A responsible adult must drive you to and from the hospital on the day of your surgery and stay with you for 24 hours.  Discharge prescriptions can be  filled by the hospital pharmacy during regular pharmacy hours.  If you are having surgery late in the day/evening your prescription may be e-prescribed to your pharmacy.  Please verify your pharmacy hours or chose a 24 hour pharmacy to avoid not having access to your prescription because your pharmacy has closed for the day.    If you are staying overnight following surgery, you will be transported to your hospital room following the recovery period.  [facility] has all private rooms.    If you have any questions please call Pre-Admission Testing at [phone].  Deductibles and co-payments are collected on the day of service. Please be prepared to pay the required co-pay, deductible or deposit on the day of service as defined by your plan.    CHLORHEXIDINE CLOTH INSTRUCTIONS  The morning of surgery follow these instructions using the Chlorhexidine cloths you've been given.  These steps reduce bacteria on the body.  Do not use the cloths near your eyes, ears mouth, genitalia or on open wounds.  Throw the cloths away after use but do not try to flush them down a toilet.      Open and remove one cloth at a time from the package.    Leave the cloth unfolded and begin the bathing.  Massage the skin with the cloths using gentle pressure to remove bacteria.  Do not scrub harshly.   Follow the steps below with one 2% CHG cloth per area (6 total cloths).  One cloth for neck, shoulders and chest.  One cloth for both arms, hands, fingers and underarms (do underarms last).  One cloth for the abdomen followed by groin.  One cloth for right leg and foot including between the toes.  One cloth for left leg and foot including between the toes.  The last cloth is to be used for the back of the neck, back and buttocks.    Allow the CHG to air dry 3 minutes on the skin which will give it time to work and decrease the chance of irritation.  The skin may feel sticky until it is dry.  Do not rinse with water or any other  liquid or you will lose the beneficial effects of the CHG.  If mild skin irritation occurs, do rinse the skin to remove the CHG.  Report this to the nurse at time of admission.  Do not apply lotions, creams, ointments, deodorants or perfumes after using the clothes. Dress in clean clothes before coming to the hospital.     Call your surgeon immediately if you experience any of the following symptoms:  Sore Throat  Shortness of Breath or difficulty breathing  Cough  Chills  Body soreness or muscle pain  Headache  Fever  New loss of taste or smell  Do not arrive for your surgery ill.  Your procedure will need to be rescheduled to another time.  You will need to call your physician before the day of surgery to avoid any unnecessary exposure to hospital staff as well as other patients.

## 2022-11-04 ENCOUNTER — ANESTHESIA (OUTPATIENT)
Dept: PERIOP | Facility: HOSPITAL | Age: 72
End: 2022-11-04

## 2022-11-04 ENCOUNTER — HOSPITAL ENCOUNTER (OUTPATIENT)
Facility: HOSPITAL | Age: 72
Setting detail: HOSPITAL OUTPATIENT SURGERY
Discharge: HOME | End: 2022-11-04
Attending: SURGERY | Admitting: SURGERY

## 2022-11-04 ENCOUNTER — ANESTHESIA EVENT (OUTPATIENT)
Dept: PERIOP | Facility: HOSPITAL | Age: 72
End: 2022-11-04

## 2022-11-04 VITALS
HEART RATE: 51 BPM | OXYGEN SATURATION: 100 % | SYSTOLIC BLOOD PRESSURE: 127 MMHG | TEMPERATURE: 97.5 F | DIASTOLIC BLOOD PRESSURE: 85 MMHG | RESPIRATION RATE: 16 BRPM

## 2022-11-04 DIAGNOSIS — K43.2 INCISIONAL HERNIA, WITHOUT OBSTRUCTION OR GANGRENE: Primary | ICD-10-CM

## 2022-11-04 PROCEDURE — 25010000002 CEFAZOLIN IN DEXTROSE 2-4 GM/100ML-% SOLUTION: Performed by: SURGERY

## 2022-11-04 PROCEDURE — C1781 MESH (IMPLANTABLE): HCPCS | Performed by: SURGERY

## 2022-11-04 PROCEDURE — 25010000002 ONDANSETRON PER 1 MG: Performed by: NURSE ANESTHETIST, CERTIFIED REGISTERED

## 2022-11-04 PROCEDURE — 25010000002 PROPOFOL 10 MG/ML EMULSION: Performed by: NURSE ANESTHETIST, CERTIFIED REGISTERED

## 2022-11-04 PROCEDURE — G0463 HOSPITAL OUTPT CLINIC VISIT: HCPCS

## 2022-11-04 PROCEDURE — 49568 PR IMPLANT MESH HERNIA REPAIR/DEBRIDEMENT CLOSURE: CPT | Performed by: SURGERY

## 2022-11-04 PROCEDURE — 25010000002 FENTANYL CITRATE (PF) 100 MCG/2ML SOLUTION: Performed by: NURSE ANESTHETIST, CERTIFIED REGISTERED

## 2022-11-04 PROCEDURE — 25010000002 KETOROLAC TROMETHAMINE PER 15 MG: Performed by: NURSE ANESTHETIST, CERTIFIED REGISTERED

## 2022-11-04 PROCEDURE — 49560 PR REPAIR INCISIONAL HERNIA,REDUCIBLE: CPT | Performed by: SURGERY

## 2022-11-04 DEVICE — VENTRALEX ST HERNIA PATCH
Type: IMPLANTABLE DEVICE | Site: ABDOMEN | Status: FUNCTIONAL
Brand: VENTRALEX ST HERNIA PATCH

## 2022-11-04 RX ORDER — ONDANSETRON 4 MG/1
4 TABLET, FILM COATED ORAL EVERY 6 HOURS PRN
Qty: 10 TABLET | Refills: 1 | Status: SHIPPED | OUTPATIENT
Start: 2022-11-04 | End: 2022-11-10

## 2022-11-04 RX ORDER — PROMETHAZINE HYDROCHLORIDE 25 MG/1
25 TABLET ORAL ONCE AS NEEDED
Status: DISCONTINUED | OUTPATIENT
Start: 2022-11-04 | End: 2022-11-04 | Stop reason: HOSPADM

## 2022-11-04 RX ORDER — MIDAZOLAM HYDROCHLORIDE 1 MG/ML
0.5 INJECTION INTRAMUSCULAR; INTRAVENOUS
Status: DISCONTINUED | OUTPATIENT
Start: 2022-11-04 | End: 2022-11-04 | Stop reason: HOSPADM

## 2022-11-04 RX ORDER — PROMETHAZINE HYDROCHLORIDE 25 MG/1
25 SUPPOSITORY RECTAL ONCE AS NEEDED
Status: DISCONTINUED | OUTPATIENT
Start: 2022-11-04 | End: 2022-11-04 | Stop reason: HOSPADM

## 2022-11-04 RX ORDER — ONDANSETRON 2 MG/ML
4 INJECTION INTRAMUSCULAR; INTRAVENOUS ONCE AS NEEDED
Status: DISCONTINUED | OUTPATIENT
Start: 2022-11-04 | End: 2022-11-04 | Stop reason: HOSPADM

## 2022-11-04 RX ORDER — HYDROCODONE BITARTRATE AND ACETAMINOPHEN 5; 325 MG/1; MG/1
TABLET ORAL
Qty: 24 TABLET | Refills: 0 | Status: SHIPPED | OUTPATIENT
Start: 2022-11-04 | End: 2022-11-10

## 2022-11-04 RX ORDER — HYDROMORPHONE HYDROCHLORIDE 1 MG/ML
0.5 INJECTION, SOLUTION INTRAMUSCULAR; INTRAVENOUS; SUBCUTANEOUS
Status: DISCONTINUED | OUTPATIENT
Start: 2022-11-04 | End: 2022-11-04 | Stop reason: HOSPADM

## 2022-11-04 RX ORDER — LIDOCAINE HYDROCHLORIDE 20 MG/ML
INJECTION, SOLUTION INFILTRATION; PERINEURAL AS NEEDED
Status: DISCONTINUED | OUTPATIENT
Start: 2022-11-04 | End: 2022-11-04 | Stop reason: SURG

## 2022-11-04 RX ORDER — HYDROCODONE BITARTRATE AND ACETAMINOPHEN 7.5; 325 MG/1; MG/1
1 TABLET ORAL ONCE AS NEEDED
Status: DISCONTINUED | OUTPATIENT
Start: 2022-11-04 | End: 2022-11-04 | Stop reason: HOSPADM

## 2022-11-04 RX ORDER — FLUMAZENIL 0.1 MG/ML
0.2 INJECTION INTRAVENOUS AS NEEDED
Status: DISCONTINUED | OUTPATIENT
Start: 2022-11-04 | End: 2022-11-04 | Stop reason: HOSPADM

## 2022-11-04 RX ORDER — EPHEDRINE SULFATE 50 MG/ML
5 INJECTION, SOLUTION INTRAVENOUS ONCE AS NEEDED
Status: DISCONTINUED | OUTPATIENT
Start: 2022-11-04 | End: 2022-11-04 | Stop reason: HOSPADM

## 2022-11-04 RX ORDER — FENTANYL CITRATE 50 UG/ML
INJECTION, SOLUTION INTRAMUSCULAR; INTRAVENOUS AS NEEDED
Status: DISCONTINUED | OUTPATIENT
Start: 2022-11-04 | End: 2022-11-04 | Stop reason: SURG

## 2022-11-04 RX ORDER — LABETALOL HYDROCHLORIDE 5 MG/ML
5 INJECTION, SOLUTION INTRAVENOUS
Status: DISCONTINUED | OUTPATIENT
Start: 2022-11-04 | End: 2022-11-04 | Stop reason: HOSPADM

## 2022-11-04 RX ORDER — FAMOTIDINE 10 MG/ML
20 INJECTION, SOLUTION INTRAVENOUS ONCE
Status: COMPLETED | OUTPATIENT
Start: 2022-11-04 | End: 2022-11-04

## 2022-11-04 RX ORDER — SODIUM CHLORIDE 0.9 % (FLUSH) 0.9 %
3 SYRINGE (ML) INJECTION EVERY 12 HOURS SCHEDULED
Status: DISCONTINUED | OUTPATIENT
Start: 2022-11-04 | End: 2022-11-04 | Stop reason: HOSPADM

## 2022-11-04 RX ORDER — DIPHENHYDRAMINE HCL 25 MG
25 CAPSULE ORAL
Status: DISCONTINUED | OUTPATIENT
Start: 2022-11-04 | End: 2022-11-04 | Stop reason: HOSPADM

## 2022-11-04 RX ORDER — NALOXONE HCL 0.4 MG/ML
0.2 VIAL (ML) INJECTION AS NEEDED
Status: DISCONTINUED | OUTPATIENT
Start: 2022-11-04 | End: 2022-11-04 | Stop reason: HOSPADM

## 2022-11-04 RX ORDER — SODIUM CHLORIDE, SODIUM LACTATE, POTASSIUM CHLORIDE, CALCIUM CHLORIDE 600; 310; 30; 20 MG/100ML; MG/100ML; MG/100ML; MG/100ML
9 INJECTION, SOLUTION INTRAVENOUS CONTINUOUS
Status: DISCONTINUED | OUTPATIENT
Start: 2022-11-04 | End: 2022-11-04 | Stop reason: HOSPADM

## 2022-11-04 RX ORDER — FENTANYL CITRATE 50 UG/ML
50 INJECTION, SOLUTION INTRAMUSCULAR; INTRAVENOUS
Status: DISCONTINUED | OUTPATIENT
Start: 2022-11-04 | End: 2022-11-04 | Stop reason: HOSPADM

## 2022-11-04 RX ORDER — OXYCODONE AND ACETAMINOPHEN 7.5; 325 MG/1; MG/1
1 TABLET ORAL EVERY 4 HOURS PRN
Status: DISCONTINUED | OUTPATIENT
Start: 2022-11-04 | End: 2022-11-04 | Stop reason: HOSPADM

## 2022-11-04 RX ORDER — SODIUM CHLORIDE 0.9 % (FLUSH) 0.9 %
3-10 SYRINGE (ML) INJECTION AS NEEDED
Status: DISCONTINUED | OUTPATIENT
Start: 2022-11-04 | End: 2022-11-04 | Stop reason: HOSPADM

## 2022-11-04 RX ORDER — ACETAMINOPHEN 325 MG/1
650 TABLET ORAL ONCE AS NEEDED
Status: DISCONTINUED | OUTPATIENT
Start: 2022-11-04 | End: 2022-11-04 | Stop reason: HOSPADM

## 2022-11-04 RX ORDER — LIDOCAINE HYDROCHLORIDE 10 MG/ML
0.5 INJECTION, SOLUTION EPIDURAL; INFILTRATION; INTRACAUDAL; PERINEURAL ONCE AS NEEDED
Status: COMPLETED | OUTPATIENT
Start: 2022-11-04 | End: 2022-11-04

## 2022-11-04 RX ORDER — PROPOFOL 10 MG/ML
VIAL (ML) INTRAVENOUS AS NEEDED
Status: DISCONTINUED | OUTPATIENT
Start: 2022-11-04 | End: 2022-11-04 | Stop reason: SURG

## 2022-11-04 RX ORDER — KETOROLAC TROMETHAMINE 30 MG/ML
INJECTION, SOLUTION INTRAMUSCULAR; INTRAVENOUS AS NEEDED
Status: DISCONTINUED | OUTPATIENT
Start: 2022-11-04 | End: 2022-11-04 | Stop reason: SURG

## 2022-11-04 RX ORDER — MAGNESIUM HYDROXIDE 1200 MG/15ML
LIQUID ORAL AS NEEDED
Status: DISCONTINUED | OUTPATIENT
Start: 2022-11-04 | End: 2022-11-04 | Stop reason: HOSPADM

## 2022-11-04 RX ORDER — CEFAZOLIN SODIUM 2 G/100ML
2 INJECTION, SOLUTION INTRAVENOUS ONCE
Status: COMPLETED | OUTPATIENT
Start: 2022-11-04 | End: 2022-11-04

## 2022-11-04 RX ORDER — ONDANSETRON 2 MG/ML
INJECTION INTRAMUSCULAR; INTRAVENOUS AS NEEDED
Status: DISCONTINUED | OUTPATIENT
Start: 2022-11-04 | End: 2022-11-04 | Stop reason: SURG

## 2022-11-04 RX ORDER — ACETAMINOPHEN 650 MG/1
650 SUPPOSITORY RECTAL ONCE AS NEEDED
Status: DISCONTINUED | OUTPATIENT
Start: 2022-11-04 | End: 2022-11-04 | Stop reason: HOSPADM

## 2022-11-04 RX ORDER — HYDRALAZINE HYDROCHLORIDE 20 MG/ML
5 INJECTION INTRAMUSCULAR; INTRAVENOUS
Status: DISCONTINUED | OUTPATIENT
Start: 2022-11-04 | End: 2022-11-04 | Stop reason: HOSPADM

## 2022-11-04 RX ORDER — BUPIVACAINE HYDROCHLORIDE AND EPINEPHRINE 5; 5 MG/ML; UG/ML
INJECTION, SOLUTION EPIDURAL; INTRACAUDAL; PERINEURAL AS NEEDED
Status: DISCONTINUED | OUTPATIENT
Start: 2022-11-04 | End: 2022-11-04 | Stop reason: HOSPADM

## 2022-11-04 RX ORDER — DIPHENHYDRAMINE HYDROCHLORIDE 50 MG/ML
12.5 INJECTION INTRAMUSCULAR; INTRAVENOUS
Status: DISCONTINUED | OUTPATIENT
Start: 2022-11-04 | End: 2022-11-04 | Stop reason: HOSPADM

## 2022-11-04 RX ADMIN — PROPOFOL 30 MG: 10 INJECTION, EMULSION INTRAVENOUS at 10:19

## 2022-11-04 RX ADMIN — FENTANYL CITRATE 50 MCG: 50 INJECTION, SOLUTION INTRAMUSCULAR; INTRAVENOUS at 10:19

## 2022-11-04 RX ADMIN — LIDOCAINE HYDROCHLORIDE 0.5 ML: 10 INJECTION, SOLUTION EPIDURAL; INFILTRATION; INTRACAUDAL; PERINEURAL at 08:04

## 2022-11-04 RX ADMIN — FENTANYL CITRATE 50 MCG: 50 INJECTION, SOLUTION INTRAMUSCULAR; INTRAVENOUS at 10:09

## 2022-11-04 RX ADMIN — KETOROLAC TROMETHAMINE 30 MG: 30 INJECTION, SOLUTION INTRAMUSCULAR at 10:25

## 2022-11-04 RX ADMIN — CEFAZOLIN SODIUM 2 G: 2 INJECTION, SOLUTION INTRAVENOUS at 10:02

## 2022-11-04 RX ADMIN — FAMOTIDINE 20 MG: 10 INJECTION INTRAVENOUS at 08:11

## 2022-11-04 RX ADMIN — SODIUM CHLORIDE, POTASSIUM CHLORIDE, SODIUM LACTATE AND CALCIUM CHLORIDE 9 ML/HR: 600; 310; 30; 20 INJECTION, SOLUTION INTRAVENOUS at 08:06

## 2022-11-04 RX ADMIN — PROPOFOL 50 MG: 10 INJECTION, EMULSION INTRAVENOUS at 10:09

## 2022-11-04 RX ADMIN — ONDANSETRON 4 MG: 2 INJECTION INTRAMUSCULAR; INTRAVENOUS at 10:25

## 2022-11-04 RX ADMIN — PROPOFOL 125 MCG/KG/MIN: 10 INJECTION, EMULSION INTRAVENOUS at 10:09

## 2022-11-04 RX ADMIN — LIDOCAINE HYDROCHLORIDE 60 MG: 20 INJECTION, SOLUTION INFILTRATION; PERINEURAL at 10:09

## 2022-11-04 NOTE — OP NOTE
PREOPERATIVE DIAGNOSIS:  Incisional hernia (recurrent periumbilical)    POSTOPERATIVE DIAGNOSIS (FINDINGS):  Same    PROCEDURE:  Open incisional hernia repair    SURGEON:  Angel Gordon MD    ASSISTANT:  Lani Parnell, was responsible for performing the following activities: suction, irrigation, suturing, closing, retraction, and placing dressing, and their skilled assistance was necessary for the success of this case.    ANESTHESIA:  MAC    EBL:  Minimal    SPECIMEN(S):  none    DESCRIPTION:  In supine position under sedation, prepped and draped in the usual sterile manner.  Half percent Marcaine with epinephrine infiltrated locally.  Previous curvilinear incision beneath the umbilicus reopened and extended on both sides slightly.  Umbilicus  from the underlying recurrent hernia sac which was circumferentially dissected down to the level of the fascia.  The sutures from the previous closure were evident and the defect was just superior to the previous defect.  The umbilical hernia sac was excised and a small Ventralex ST mesh was inserted.  The fascia was closed over the mesh with interrupted 0 Ethibond sutures incorporating bites of the mesh in the closure.  Good hemostasis was noted.  The umbilicus was tacked to the fascia with 3-0 Vicryl.  The skin edges were closed with 0 Vicryl deep dermal and 5-0 Vicryl subcuticular.  Exofin applied.  Tolerated well.    Angel Gordon M.D.

## 2022-11-04 NOTE — DISCHARGE INSTRUCTIONS
Dr. Angel Gordon  [address]  [place] [id number] [phone]    Discharge Instructions for Hernia Surgery    Go home, rest and take it easy today; however, you should get up and move about several times today to reduce the risk of developing a clot in your legs.      You may experience some dizziness or memory loss from the anesthesia.  This may last for the next 24 hours.  Someone should plan on staying with you for the first 24 hours for your safety.    Do not make any important legal decisions or sign any legal papers for the next 24 hours.      Eat and drink lightly today.  Start off with liquids, jello, soup, crackers or other bland foods at first. You may advance your diet tomorrow as tolerated as long as you do not experience any nausea or vomiting.     If skin glue (Dermabond) was used, your incisions are protected and covered.  The invisible glue will dissolve on its own as your incision heals. If dressings were used, you may remove your outer dressings in 3 days.  The white tapes called steri-strips should stay in place.  They will fall off on their own in 1-2 weeks.  Do not worry if they come off sooner.      If dressings were used, you may notice some bleeding/drainage on your outer dressings. A little bloody drainage is normal. If the bleeding/drainage is such that the bandage cannot absorb it, remove the dressing, apply clean gauze and apply firm pressure for a full 15 minutes.  If the bleeding continues, please call me.    You may shower tomorrow allowing water to run over the incisions; however, do not scrub the incisions.  No tub baths until your incisions are completely healed.      No lifting > 20 lbs. until you are seen at your follow-up visit.         You have received a prescription for a narcotic pain medicine, as you will have some pain following surgery.   You will not be totally pain free, but your pain medicine should make the pain tolerable.  Please take your pain  medicine as prescribed and always take your pills with food to prevent nausea. If you are having severe pain that cannot be controlled by the pain medicine, please contact me.      You have also received a prescription for an anti-nausea medicine.  Please take this as prescribed for any nausea or vomiting.  Nausea could be a result of the anesthesia or a result of the narcotic pain medicine.  If you experience severe nausea and vomiting that cannot be controlled by the nausea medicine, please call me.      If you had a laparoscopic surgery, it is not unusual to experience pain/discomfort in your shoulders or under your ribs after surgery.  It is from the gas used during the laparoscopic procedure and usually lasts 1-3 days.  The prescription pain medicine is used to treat the surgical pain and does not typically alleviate this “gassy” pain.     No driving for 24 hours and for as long as you are taking your prescription pain medicine.    You will need to call the office at [phone] to schedule a follow-up appointment in 6-10 days.     Remember to contact me for any of the following:    Fever > 101 degrees  Severe pain that cannot be controlled by taking your pain pills  Severe nausea or vomiting that cannot be controlled by taking your nausea pills  Significant bleeding of your incisions  Drainage that has a bad smell or is yellow or green in appearance  Any other questions or concerns      Additional Instruction for Inguinal Hernia Patients Only    If you did not urinate at the hospital after your surgery or if you feel the need to urinate and cannot, this will necessitate a return to the Emergency Room for placement of a urinary catheter.  You should also notify me as well.  As a rule, you should be able to empty your bladder within 4-6 hours after discharge from the hospital.      You may notice some scrotal bruising and/or swelling. A scrotal support or briefs as well as ice packs may be used to alleviate  discomfort.

## 2022-11-04 NOTE — ANESTHESIA PREPROCEDURE EVALUATION
Anesthesia Evaluation     NPO Solid Status: > 8 hours  NPO Liquid Status: > 8 hours           Airway   Mallampati: II  TM distance: >3 FB  No difficulty expected  Dental      Pulmonary    Cardiovascular     (+) hypertension, hyperlipidemia,       Neuro/Psych  (+) dizziness/light headedness, tremors,    GI/Hepatic/Renal/Endo    (+)  GERD,      Musculoskeletal     Abdominal    Substance History      OB/GYN          Other   arthritis,                      Anesthesia Plan    ASA 3     MAC     intravenous induction     Anesthetic plan, risks, benefits, and alternatives have been provided, discussed and informed consent has been obtained with: patient.    Plan discussed with CRNA.        CODE STATUS:

## 2022-11-10 ENCOUNTER — OFFICE VISIT (OUTPATIENT)
Dept: SURGERY | Facility: CLINIC | Age: 72
End: 2022-11-10

## 2022-11-10 DIAGNOSIS — Z48.89 POSTOPERATIVE VISIT: Primary | ICD-10-CM

## 2022-11-10 PROCEDURE — 99024 POSTOP FOLLOW-UP VISIT: CPT | Performed by: SURGERY

## 2022-11-10 NOTE — PROGRESS NOTES
Postoperative visit    Open incisional hernia repair with mesh 11/4/2022    Office visit: His incision is healed well and the skin looks good with no evidence of infection.  He does have some swelling in the umbilicus is pulled away from the fascia.  I recommended aspiration.  After prepping the skin with alcohol I infiltrated 1% lidocaine with epinephrine and inserted an 18-gauge needle and aspirated 15 mL of liquefied hematoma with resolution of the swelling.  I have asked him to come back in 1 week.

## 2022-11-17 ENCOUNTER — OFFICE VISIT (OUTPATIENT)
Dept: SURGERY | Facility: CLINIC | Age: 72
End: 2022-11-17

## 2022-11-17 DIAGNOSIS — Z48.89 POSTOPERATIVE VISIT: Primary | ICD-10-CM

## 2022-11-17 PROCEDURE — 99024 POSTOP FOLLOW-UP VISIT: CPT | Performed by: SURGERY

## 2022-11-17 NOTE — PROGRESS NOTES
Incision remains well-healed.  Today I aspirated 10 mL of seroma fluid.  Compressive dressing applied and recommended.  Follow-up in 2 weeks.

## 2022-12-01 ENCOUNTER — OFFICE VISIT (OUTPATIENT)
Dept: SURGERY | Facility: CLINIC | Age: 72
End: 2022-12-01

## 2022-12-01 DIAGNOSIS — Z48.89 POSTOPERATIVE VISIT: Primary | ICD-10-CM

## 2022-12-01 PROCEDURE — 99024 POSTOP FOLLOW-UP VISIT: CPT | Performed by: SURGERY

## 2022-12-02 NOTE — PROGRESS NOTES
There appears to be minimal recurrence of seroma today.  I cleaned the skin with alcohol, infiltrated with 1% lidocaine with epinephrine, and inserted 18-gauge needle and today only withdrew 1 mL of serous fluid.  At this point this should not recur/resolve without further intervention.  Return as needed.

## 2023-01-23 NOTE — H&P
CC: Hernia    HPI: 72-year-old gentleman with visible and palpable bulge adjacent to previous umbilical hernia.  Umbilical hernia repair had previously been performed in 2010.    PMH, PSH, MEDS AND ALLERGIES reviewed and reconciled in  EPIC    PHYSICAL EXAM:  • Constitutional:  awake, alert, no acute distress  • VS: afebrile, VSS  • Respiratory:  normal inspiratory effort  • Cardiovascular: regular rate  • Gastrointestinal: Soft    ROS:  relevant systems negative other than any presenting complaints    ASSESSMENT/PLAN:    72-year-old gentleman with incisional hernia adjacent to previous umbilical hernia repair that was performed without mesh.  He wishes to proceed with incisional hernia repair today.  Understands the rationale for the procedures, the nature of the procedure, and the risks including but not limited to bleeding, infection, and use of mesh as well as recurrence.    Angel Gordon M.D.   Please call Merary/daughter in law  Requesting assistance to have patient moved to [address] [facility] [place]  Nabil Guthrie care  Please call Jory Olivasence to discuss/advise  Tasked to nursing

## 2023-04-06 ENCOUNTER — TELEPHONE (OUTPATIENT)
Dept: FAMILY MEDICINE CLINIC | Facility: CLINIC | Age: 73
End: 2023-04-06

## 2023-04-06 NOTE — TELEPHONE ENCOUNTER
"  Caller: Paul Sterling \"Nabeel\"    Relationship: Self    Best call back number: [phone]    What was the call regarding: PATIENT NEEDS LAB ORDERS TO SCHEDULE LABS FOR UPCOMING APPOINTMENT ON 4/13/2023.  "

## 2023-04-10 DIAGNOSIS — R03.0 ELEVATED BLOOD-PRESSURE READING WITHOUT DIAGNOSIS OF HYPERTENSION: ICD-10-CM

## 2023-04-10 DIAGNOSIS — Z12.5 SCREENING PSA (PROSTATE SPECIFIC ANTIGEN): Primary | ICD-10-CM

## 2023-04-10 DIAGNOSIS — E78.5 HYPERLIPIDEMIA, UNSPECIFIED HYPERLIPIDEMIA TYPE: ICD-10-CM

## 2023-04-11 DIAGNOSIS — E78.5 HYPERLIPIDEMIA, UNSPECIFIED HYPERLIPIDEMIA TYPE: ICD-10-CM

## 2023-04-11 DIAGNOSIS — Z12.5 SCREENING PSA (PROSTATE SPECIFIC ANTIGEN): ICD-10-CM

## 2023-04-11 DIAGNOSIS — R03.0 ELEVATED BLOOD-PRESSURE READING WITHOUT DIAGNOSIS OF HYPERTENSION: ICD-10-CM

## 2023-04-23 DIAGNOSIS — K21.9 GASTROESOPHAGEAL REFLUX DISEASE, UNSPECIFIED WHETHER ESOPHAGITIS PRESENT: ICD-10-CM

## 2023-04-24 RX ORDER — PANTOPRAZOLE SODIUM 40 MG/1
TABLET, DELAYED RELEASE ORAL
Qty: 90 TABLET | Refills: 1 | Status: SHIPPED | OUTPATIENT
Start: 2023-04-24

## 2023-04-24 RX ORDER — ATORVASTATIN CALCIUM 10 MG/1
TABLET, FILM COATED ORAL
Qty: 90 TABLET | Refills: 1 | Status: SHIPPED | OUTPATIENT
Start: 2023-04-24

## 2023-04-24 NOTE — TELEPHONE ENCOUNTER
Rx Refill Note  Requested Prescriptions     Pending Prescriptions Disp Refills   • pantoprazole (PROTONIX) 40 MG EC tablet [Pharmacy Med Name: PANTOPRAZOLE SOD DR 40 MG TAB] 90 tablet 1     Sig: TAKE 1 TABLET BY MOUTH EVERY DAY   • atorvastatin (LIPITOR) 10 MG tablet [Pharmacy Med Name: ATORVASTATIN 10 MG TABLET] 90 tablet 1     Sig: TAKE 1 TABLET BY MOUTH EVERY DAY      Last office visit with prescribing clinician: 2/15/2022   Last telemedicine visit with prescribing clinician: 5/18/2023   Next office visit with prescribing clinician: 5/18/2023       {TIP  Please add Last Relevant Lab Date if appropriate: 2/10/22                 Would you like a call back once the refill request has been completed: [] Yes [] No    If the office needs to give you a call back, can they leave a voicemail: [] Yes [] No    Toni Jackson MA  04/24/23, 10:36 EDT

## 2023-05-16 ENCOUNTER — TELEPHONE (OUTPATIENT)
Dept: FAMILY MEDICINE CLINIC | Facility: CLINIC | Age: 73
End: 2023-05-16

## 2023-05-16 NOTE — TELEPHONE ENCOUNTER
"Caller: Paul Sterling \"Nabeel\"    Relationship: Self    Best call back number: [phone]    What was the call regarding: PATIENT CALLED TO RESCHEDULE MEDICARE WELLNESS VISIT WITH DR. RESENDEZ BUT DID NOT WANT TO WAIT UNTIL June OF 2024. PATIENT IS NOW SCHEDULED WITH SANG HANNA FOR MEDICARE WELLNESS VISIT.     PATIENT STATES FOR LABS HE WILL GO TO LABS DOWNSTAIRS AS PLANNED.       "

## 2023-05-19 ENCOUNTER — LAB (OUTPATIENT)
Dept: LAB | Facility: HOSPITAL | Age: 73
End: 2023-05-19
Payer: MEDICARE

## 2023-05-19 LAB
ALBUMIN SERPL-MCNC: 4.1 G/DL (ref 3.5–5.2)
ALBUMIN/GLOB SERPL: 1.8 G/DL
ALP SERPL-CCNC: 56 U/L (ref 39–117)
ALT SERPL W P-5'-P-CCNC: 35 U/L (ref 1–41)
ANION GAP SERPL CALCULATED.3IONS-SCNC: 8.4 MMOL/L (ref 5–15)
AST SERPL-CCNC: 26 U/L (ref 1–40)
BASOPHILS # BLD AUTO: 0.03 10*3/MM3 (ref 0–0.2)
BASOPHILS NFR BLD AUTO: 0.6 % (ref 0–1.5)
BILIRUB SERPL-MCNC: 0.5 MG/DL (ref 0–1.2)
BUN SERPL-MCNC: 15 MG/DL (ref 8–23)
BUN/CREAT SERPL: 17 (ref 7–25)
CALCIUM SPEC-SCNC: 9.7 MG/DL (ref 8.6–10.5)
CHLORIDE SERPL-SCNC: 107 MMOL/L (ref 98–107)
CHOLEST SERPL-MCNC: 154 MG/DL (ref 0–200)
CO2 SERPL-SCNC: 22.6 MMOL/L (ref 22–29)
CREAT SERPL-MCNC: 0.88 MG/DL (ref 0.76–1.27)
DEPRECATED RDW RBC AUTO: 42.3 FL (ref 37–54)
EGFRCR SERPLBLD CKD-EPI 2021: 91.4 ML/MIN/1.73
EOSINOPHIL # BLD AUTO: 0.38 10*3/MM3 (ref 0–0.4)
EOSINOPHIL NFR BLD AUTO: 7.1 % (ref 0.3–6.2)
ERYTHROCYTE [DISTWIDTH] IN BLOOD BY AUTOMATED COUNT: 13.1 % (ref 12.3–15.4)
GLOBULIN UR ELPH-MCNC: 2.3 GM/DL
GLUCOSE SERPL-MCNC: 109 MG/DL (ref 65–99)
HCT VFR BLD AUTO: 46.1 % (ref 37.5–51)
HDLC SERPL-MCNC: 59 MG/DL (ref 40–60)
HGB BLD-MCNC: 15.8 G/DL (ref 13–17.7)
IMM GRANULOCYTES # BLD AUTO: 0.02 10*3/MM3 (ref 0–0.05)
IMM GRANULOCYTES NFR BLD AUTO: 0.4 % (ref 0–0.5)
LDLC SERPL CALC-MCNC: 85 MG/DL (ref 0–100)
LDLC/HDLC SERPL: 1.45 {RATIO}
LYMPHOCYTES # BLD AUTO: 1.09 10*3/MM3 (ref 0.7–3.1)
LYMPHOCYTES NFR BLD AUTO: 20.3 % (ref 19.6–45.3)
MCH RBC QN AUTO: 30.3 PG (ref 26.6–33)
MCHC RBC AUTO-ENTMCNC: 34.3 G/DL (ref 31.5–35.7)
MCV RBC AUTO: 88.3 FL (ref 79–97)
MONOCYTES # BLD AUTO: 0.56 10*3/MM3 (ref 0.1–0.9)
MONOCYTES NFR BLD AUTO: 10.4 % (ref 5–12)
NEUTROPHILS NFR BLD AUTO: 3.29 10*3/MM3 (ref 1.7–7)
NEUTROPHILS NFR BLD AUTO: 61.2 % (ref 42.7–76)
NRBC BLD AUTO-RTO: 0 /100 WBC (ref 0–0.2)
PLATELET # BLD AUTO: 164 10*3/MM3 (ref 140–450)
PMV BLD AUTO: 12.3 FL (ref 6–12)
POTASSIUM SERPL-SCNC: 4.4 MMOL/L (ref 3.5–5.2)
PROT SERPL-MCNC: 6.4 G/DL (ref 6–8.5)
PSA SERPL-MCNC: 0.48 NG/ML (ref 0–4)
RBC # BLD AUTO: 5.22 10*6/MM3 (ref 4.14–5.8)
SODIUM SERPL-SCNC: 138 MMOL/L (ref 136–145)
T4 FREE SERPL-MCNC: 1.08 NG/DL (ref 0.93–1.7)
TRIGL SERPL-MCNC: 48 MG/DL (ref 0–150)
TSH SERPL DL<=0.05 MIU/L-ACNC: 3.72 UIU/ML (ref 0.27–4.2)
VLDLC SERPL-MCNC: 10 MG/DL (ref 5–40)
WBC NRBC COR # BLD: 5.37 10*3/MM3 (ref 3.4–10.8)

## 2023-05-19 PROCEDURE — 80053 COMPREHEN METABOLIC PANEL: CPT | Performed by: INTERNAL MEDICINE

## 2023-05-19 PROCEDURE — 80061 LIPID PANEL: CPT | Performed by: INTERNAL MEDICINE

## 2023-05-19 PROCEDURE — G0103 PSA SCREENING: HCPCS | Performed by: INTERNAL MEDICINE

## 2023-05-19 PROCEDURE — 84439 ASSAY OF FREE THYROXINE: CPT | Performed by: INTERNAL MEDICINE

## 2023-05-19 PROCEDURE — 84443 ASSAY THYROID STIM HORMONE: CPT | Performed by: INTERNAL MEDICINE

## 2023-05-19 PROCEDURE — 85025 COMPLETE CBC W/AUTO DIFF WBC: CPT | Performed by: INTERNAL MEDICINE

## 2023-05-23 ENCOUNTER — OFFICE VISIT (OUTPATIENT)
Dept: FAMILY MEDICINE CLINIC | Facility: CLINIC | Age: 73
End: 2023-05-23
Payer: MEDICARE

## 2023-05-23 VITALS
SYSTOLIC BLOOD PRESSURE: 118 MMHG | RESPIRATION RATE: 18 BRPM | BODY MASS INDEX: 27.3 KG/M2 | HEIGHT: 69 IN | OXYGEN SATURATION: 97 % | HEART RATE: 65 BPM | WEIGHT: 184.3 LBS | TEMPERATURE: 96.9 F | DIASTOLIC BLOOD PRESSURE: 78 MMHG

## 2023-05-23 DIAGNOSIS — M79.642 BILATERAL HAND PAIN: ICD-10-CM

## 2023-05-23 DIAGNOSIS — R73.9 ELEVATED BLOOD SUGAR: ICD-10-CM

## 2023-05-23 DIAGNOSIS — M19.91 PRIMARY OSTEOARTHRITIS, UNSPECIFIED SITE: ICD-10-CM

## 2023-05-23 DIAGNOSIS — M79.641 BILATERAL HAND PAIN: ICD-10-CM

## 2023-05-23 DIAGNOSIS — Z00.00 MEDICARE ANNUAL WELLNESS VISIT, SUBSEQUENT: Primary | ICD-10-CM

## 2023-05-23 PROCEDURE — G0439 PPPS, SUBSEQ VISIT: HCPCS | Performed by: NURSE PRACTITIONER

## 2023-05-23 PROCEDURE — 1170F FXNL STATUS ASSESSED: CPT | Performed by: NURSE PRACTITIONER

## 2023-05-23 RX ORDER — MELOXICAM 15 MG/1
15 TABLET ORAL DAILY
Qty: 30 TABLET | Refills: 2 | Status: SHIPPED | OUTPATIENT
Start: 2023-05-23

## 2023-05-23 NOTE — PROGRESS NOTES
The ABCs of the Annual Wellness Visit  Subsequent Medicare Wellness Visit    Subjective      Paul Sterling is a 73 y.o. male who presents for a Subsequent Medicare Wellness Visit.    The following portions of the patient's history were reviewed and   updated as appropriate: allergies, current medications, past family history, past medical history, past social history, past surgical history and problem list.    Compared to one year ago, the patient feels his physical   health is the same. Feels better, weight loss with improved GI symptoms, worse in regards to arthritis    Compared to one year ago, the patient feels his mental   health is the same.    Recent Hospitalizations:  He was not admitted to the hospital during the last year.       Current Medical Providers:  Patient Care Team:  Ofelia Bradford MD as PCP - General (Internal Medicine)    Outpatient Medications Prior to Visit   Medication Sig Dispense Refill   • atorvastatin (LIPITOR) 10 MG tablet TAKE 1 TABLET BY MOUTH EVERY DAY 90 tablet 1   • pantoprazole (PROTONIX) 40 MG EC tablet TAKE 1 TABLET BY MOUTH EVERY DAY 90 tablet 1   • clonazePAM (KlonoPIN) 0.5 MG tablet Take 1 tablet by mouth Daily As Needed (HAND TREMORS). 90 tablet 0   • desonide (DESOWEN) 0.05 % cream Apply 1 application topically to the appropriate area as directed 2 (Two) Times a Day As Needed for Irritation.     • Glucosamine-Chondroit-Vit C-Mn (GLUCOSAMINE CHONDR 1500 COMPLX PO) Take 1 tablet by mouth Daily.     • hydrocortisone 1 % cream Apply 1 application topically to the appropriate area as directed As Needed for Irritation.     • hydrocortisone 2.5 % cream Apply 1 application topically to the appropriate area as directed As Needed for Irritation.  3   • Loratadine (CLARITIN PO) Take 10 mg by mouth Daily As Needed (ALLERGIES).     • methylcellulose, Laxative, (CITRUCEL) 500 MG tablet tablet Take 6 tablets by mouth Every Morning.     • methylcellulose, Laxative, (CITRUCEL) 500  MG tablet tablet Take 4 tablets by mouth Every Evening.     • Mirabegron ER (Myrbetriq) 50 MG tablet sustained-release 24 hour 24 hr tablet Take 50 mg by mouth Daily. 90 tablet 1   • Multiple Vitamins-Minerals (PRESERVISION AREDS 2 PO) Take 2 capsules by mouth Daily.     • polyethyl glycol-propyl glycol (Systane) 0.4-0.3 % solution ophthalmic solution (artificial tears) Administer 1 drop to both eyes Every 1 (One) Hour As Needed (DRYNESS).     • triamcinolone (KENALOG) 0.1 % ointment Apply 1 application topically to the appropriate area as directed 3 (Three) Times a Day As Needed for Irritation.       No facility-administered medications prior to visit.       No opioid medication identified on active medication list. I have reviewed chart for other potential  high risk medication/s and harmful drug interactions in the elderly.          Aspirin is not on active medication list.  Aspirin use is not indicated based on review of current medical condition/s. Risk of harm outweighs potential benefits.  .    Patient Active Problem List   Diagnosis   • Well adult exam   • Rash   • Tremor of both hands   • Insomnia   • Encounter for immunization   • Hyperlipidemia   • Elevated blood-pressure reading without diagnosis of hypertension   • Rectal bleeding   • Family history of colon cancer   • Family history of colonic polyps   • Colon cancer screening   • History of Noonan's esophagus   • Gastroesophageal reflux disease   • Polyp of colon   • Cough   • Diarrhea   • Herniated lumbar intervertebral disc   • DDD (degenerative disc disease), lumbar   • Acute low back pain due to spinal disorder   • Overactive bladder   • Essential tremor   • Umbilical hernia without obstruction and without gangrene     Advance Care Planning   Advance Care Planning     Advance Directive is not on file.  ACP discussion was held with the patient during this visit. Patient has an advance directive (not in EMR), copy requested.     Objective   "  Vitals:    23 1125   BP: 118/78   BP Location: Left arm   Patient Position: Sitting   Cuff Size: Adult   Pulse: 65   Resp: 18   Temp: 96.9 °F (36.1 °C)   TempSrc: Temporal   SpO2: 97%   Weight: 83.6 kg (184 lb 4.8 oz)   Height: 175.3 cm (69.02\")     Estimated body mass index is 27.2 kg/m² as calculated from the following:    Height as of this encounter: 175.3 cm (69.02\").    Weight as of this encounter: 83.6 kg (184 lb 4.8 oz).    BMI is >= 25 and <30. (Overweight) The following options were offered after discussion;: weight loss educational material (shared in after visit summary) and exercise counseling/recommendations      Does the patient have evidence of cognitive impairment?   No    Lab Results   Component Value Date    TRIG 48 2023    HDL 59 2023    LDL 85 2023    VLDL 10 2023          HEALTH RISK ASSESSMENT    Smoking Status:  Social History     Tobacco Use   Smoking Status Never   Smokeless Tobacco Never     Alcohol Consumption:  Social History     Substance and Sexual Activity   Alcohol Use Yes   • Alcohol/week: 5.0 standard drinks   • Types: 5 Cans of beer per week    Comment: weekly     Fall Risk Screen:    [facility] Fall Risk Assessment was completed, and patient is at LOW risk for falls.Assessment completed on:2023    Depression Screenin/23/2023    11:24 AM   PHQ-2/PHQ-9 Depression Screening   Little Interest or Pleasure in Doing Things 0-->not at all   Feeling Down, Depressed or Hopeless 0-->not at all   PHQ-9: Brief Depression Severity Measure Score 0       Health Habits and Functional and Cognitive Screenin/23/2023    11:24 AM   Functional & Cognitive Status   Do you have difficulty preparing food and eating? No   Do you have difficulty bathing yourself, getting dressed or grooming yourself? No   Do you have difficulty using the toilet? No   Do you have difficulty moving around from place to place? No   Do you have trouble with steps or getting out " of a bed or a chair? No   Current Diet Well Balanced Diet   Dental Exam Up to date   Eye Exam Up to date   Exercise (times per week) 5 times per week   Current Exercises Include Walking   Do you need help using the phone?  No   Are you deaf or do you have serious difficulty hearing?  No   Do you need help with transportation? No   Do you need help shopping? No   Do you need help preparing meals?  No   Do you need help with housework?  No   Do you need help with laundry? No   Do you need help taking your medications? No   Do you need help managing money? No   Do you ever drive or ride in a car without wearing a seat belt? No   Have you felt unusual stress, anger or loneliness in the last month? No   Who do you live with? Alone   If you need help, do you have trouble finding someone available to you? No   Have you been bothered in the last four weeks by sexual problems? No   Do you have difficulty concentrating, remembering or making decisions? No       Age-appropriate Screening Schedule:  Refer to the list below for future screening recommendations based on patient's age, sex and/or medical conditions. Orders for these recommended tests are listed in the plan section. The patient has been provided with a written plan.    Health Maintenance   Topic Date Due   • ZOSTER VACCINE (2 of 2) 05/23/2023 (Originally 7/27/2012)   • COVID-19 Vaccine (4 - Booster for Pfizer series) 05/13/2024 (Originally 12/1/2021)   • INFLUENZA VACCINE  08/01/2023   • LIPID PANEL  05/19/2024   • ANNUAL WELLNESS VISIT  05/23/2024   • COLORECTAL CANCER SCREENING  07/30/2026   • TDAP/TD VACCINES (2 - Td or Tdap) 03/22/2028   • HEPATITIS C SCREENING  Completed   • Pneumococcal Vaccine 65+  Completed                  CMS Preventative Services Quick Reference  Risk Factors Identified During Encounter:    Immunizations Discussed/Encouraged: Shingrix and COVID19    The above risks/problems have been discussed with the patient.  Pertinent information has  been shared with the patient in the After Visit Summary.    Diagnoses and all orders for this visit:    1. Medicare annual wellness visit, subsequent (Primary)    2. Bilateral hand pain  -     Ambulatory Referral to Hand Surgery    3. Elevated blood sugar  -     Hemoglobin A1c    4. Primary osteoarthritis, unspecified site    Other orders  -     meloxicam (MOBIC) 15 MG tablet; Take 1 tablet by mouth Daily.  Dispense: 30 tablet; Refill: 2    arthritis: start meloxicam daily, d/c ibuprofen use, advised to hold any NSAIDs with meloxicam including aleve/ibuprofen and name brands/generics  Ok to take tylenol arthrtis prn for breakthrough pain, for no significant improvement, consider nabumetone prn  Does have primary arthritis in bilateral hands, recommend follow up with hand specialist, referral placed    Labs reviewed, elevated glucose at 109, he does report fatigue and blurred vision at times, will proceed with A1C today    BP is well controlled, does report increased readings at home. He is able to come in for a nurse visit to monitor his BP on his electronic device and manual if he would like    He will continue current medication, follow up in one year, sooner if needed        Follow Up:   Next Medicare Wellness visit to be scheduled in 1 year.      An After Visit Summary and PPPS were made available to the patient.

## 2023-05-24 LAB — HBA1C MFR BLD: 5.7 % (ref 4.8–5.6)

## 2023-06-09 ENCOUNTER — PREP FOR SURGERY (OUTPATIENT)
Dept: SURGERY | Facility: CLINIC | Age: 73
End: 2023-06-09
Payer: MEDICARE

## 2023-06-09 DIAGNOSIS — Z80.0 FAMILY HISTORY OF COLON CANCER: Primary | ICD-10-CM

## 2023-06-19 RX ORDER — MELOXICAM 15 MG/1
15 TABLET ORAL DAILY
Qty: 90 TABLET | Refills: 0 | Status: SHIPPED | OUTPATIENT
Start: 2023-06-19

## 2023-07-26 ENCOUNTER — OFFICE VISIT (OUTPATIENT)
Dept: SURGERY | Facility: CLINIC | Age: 73
End: 2023-07-26
Payer: MEDICARE

## 2023-07-26 VITALS
WEIGHT: 185 LBS | BODY MASS INDEX: 27.4 KG/M2 | SYSTOLIC BLOOD PRESSURE: 135 MMHG | DIASTOLIC BLOOD PRESSURE: 78 MMHG | HEIGHT: 69 IN

## 2023-07-26 DIAGNOSIS — Z12.11 SCREEN FOR COLON CANCER: ICD-10-CM

## 2023-07-26 DIAGNOSIS — K21.00 GASTROESOPHAGEAL REFLUX DISEASE WITH ESOPHAGITIS WITHOUT HEMORRHAGE: Primary | ICD-10-CM

## 2023-07-26 DIAGNOSIS — Z87.19 HISTORY OF BARRETT'S ESOPHAGUS: ICD-10-CM

## 2023-07-26 NOTE — PROGRESS NOTES
ASSESSMENT/PLAN:    73-year-old gentleman in need of routine follow-up colonoscopy due to family history of his mother having had colon cancer, personal history of colon polyps and a fair prep on his last colonoscopy.  He wishes to proceed with an EGD at the same time and given his worsening reflux despite medication as well as his history of Noonan's esophagus in the past.  I have scheduled him for both an EGD and colonoscopy and have discussed the risks and rationale associated with this with him.  All questions were answered and he was willing to proceed with all recommendations.    CC:     Worsening reflux    HPI:    This is a 73-year-old gentleman presenting to the office after having last seen our practice approximately 1 year ago for an umbilical hernia repair.  He last underwent a colonoscopy in 2021 with Dr. Omalley however the prep was only fair and he was found to have colon polyps so a repeat colonoscopy was recommended in short order.  Additionally at that time he underwent an EGD as he has been undergoing routine EGDs due to a history of Noonan's esophagus.  Most recently he has noticed a worsening of his reflux, despite being on Protonix.  He does have a history of a previous Nissen fundoplication as well.  He denies dark tarry stools, bright red blood with his bowel movements, abdominal pain, abdominal distention or any additional complaints.    ENDOSCOPY:   Colonoscopy 2021: History of polyps  EGD 2021:    SOCIAL HISTORY:   Denies tobacco use  Occasional alcohol use    FAMILY HISTORY:    Colorectal cancer: Mother    PREVIOUS ABDOMINAL SURGERY    Cholecystectomy 2010  Bilateral inguinal hernia repair  Nissen fundoplication    OTHER SURGERY  Past Surgical History:   Procedure Laterality Date    CHOLECYSTECTOMY N/A 2010    Dr. Angel Gordon, [facility]    COLONOSCOPY N/A 6/20/2018    Procedure: COLONOSCOPY to ccecum with cold bx polypectomy;  Surgeon: Stuart Hernandez MD;  Location: [facility] ENDOSCOPY;   Service: Gastroenterology    COLONOSCOPY N/A 7/30/2021    Procedure: COLONOSCOPY  TO CECUM WITH COLD BIOPSIES AND HOT SNARE POLYPECTOMY AND SALINE LIFT INJECTION AND ORISE GEL;  Surgeon: Klaus Omalley MD;  Location[facility] END[facility]COPY;  Service: Gastroenterology;  Laterality: N/A;  PRE:FAMILY HISTORY OF COLON CANCER  POST: POLYPS, FAIR PREP    ENDOSCOPY N/A 6/20/2018    Procedure: ESOPHAGOGASTRODUODENOSCOPY with bx;  Surgeon: Stuart Hernandez MD;  Location: [facility] END[facility];  Service: Gastroenterology    ENDOSCOPY N/A 7/30/2021    Procedure: ESOPHAGOGASTRODUODENOSCOPY WITH COLD BIOPSIES;  Surgeon: Klaus Omalley MD;  Location: [facility] END[facility];  Service: Gastroenterology;  Laterality: N/A;  PRE: DYSPEPSIAPOST:GASTRITIS    ENDOSCOPY AND COLONOSCOPY N/A 2015    Dr. Angel Gordon, [facility]    INGUINAL HERNIA REPAIR Bilateral 2010, 2014    Dr. Angel Gordon, [facility]    NISSEN FUNDOPLICATION LAPAROSCOPIC N/A     UMBILICAL HERNIA REPAIR N/A 2010    Dr. Angel Gordon, [facility]    UMBILICAL HERNIA REPAIR N/A 11/4/2022    Procedure: UMBILICAL HERNIA REPAIR;  Surgeon: Angel Godron MD;  [place] [facility]  Service: General;  Laterality: N/A;    UPPER GASTROINTESTINAL ENDOSCOPY  07/30/2021       PAST MEDICAL HISTORY:    Past Medical History:   Diagnosis Date    Arthritis     Noonan's esophagus     Benign prostatic hyperplasia 2017    some possible symptoms    Cholelithiasis June 2009    Gallbladder removed    Colon polyp     Dizziness     AT TIMES WHEN STANDING    Dry eye     Erectile dysfunction     GERD (gastroesophageal reflux disease)     History of colonic polyps     HL (hearing loss) 2010    Hyperlipidemia     Hypertension     Low back pain 1995    off and on for many years OK now w/ pt exercise    Seasonal allergies     Tremor 1996    familial tremor    Umbilical hernia        MEDICATIONS:     Current Outpatient Medications:     atorvastatin (LIPITOR) 10 MG tablet, TAKE 1 TABLET BY MOUTH EVERY DAY, Disp: 90 tablet,  "Rfl: 1    clonazePAM (KlonoPIN) 0.5 MG tablet, Take 1 tablet by mouth Daily As Needed (HAND TREMORS)., Disp: 90 tablet, Rfl: 0    desonide (DESOWEN) 0.05 % cream, Apply 1 application  topically to the appropriate area as directed 2 (Two) Times a Day As Needed for Irritation., Disp: , Rfl:     Glucosamine-Chondroit-Vit C-Mn (GLUCOSAMINE CHONDR 1500 COMPLX PO), Take 1 tablet by mouth Daily., Disp: , Rfl:     hydrocortisone 1 % cream, Apply 1 application  topically to the appropriate area as directed As Needed for Irritation., Disp: , Rfl:     hydrocortisone 2.5 % cream, Apply 1 application  topically to the appropriate area as directed As Needed for Irritation., Disp: , Rfl: 3    Loratadine (CLARITIN PO), Take 10 mg by mouth Daily As Needed (ALLERGIES)., Disp: , Rfl:     meloxicam (MOBIC) 15 MG tablet, Take 1 tablet by mouth Daily., Disp: 90 tablet, Rfl: 0    methylcellulose, Laxative, (CITRUCEL) 500 MG tablet tablet, Take 6 tablets by mouth Every Morning., Disp: , Rfl:     Mirabegron ER (Myrbetriq) 50 MG tablet sustained-release 24 hour 24 hr tablet, Take 50 mg by mouth Daily., Disp: 90 tablet, Rfl: 1    Multiple Vitamins-Minerals (PRESERVISION AREDS 2 PO), Take 2 capsules by mouth Daily., Disp: , Rfl:     pantoprazole (PROTONIX) 40 MG EC tablet, TAKE 1 TABLET BY MOUTH EVERY DAY, Disp: 90 tablet, Rfl: 1    polyethyl glycol-propyl glycol (Systane) 0.4-0.3 % solution ophthalmic solution (artificial tears), Administer 1 drop to both eyes Every 1 (One) Hour As Needed (DRYNESS)., Disp: , Rfl:     triamcinolone (KENALOG) 0.1 % ointment, Apply 1 application  topically to the appropriate area as directed 3 (Three) Times a Day As Needed for Irritation., Disp: , Rfl:     ALLERGIES:   No Known Allergies    PHYSICAL EXAM:   Constitutional: Well-developed well-nourished, no acute distress  Vital signs:   Height 69\"  Weight 185  BMI 27.3  Psychiatric: Alert and oriented ×3, normal affect       Jl Cox PA-C    [facility] " [facility] - General Surgery   [address], [address]  [place] [address]     [address], [address]  [place] [address]     Office: [phone]  Fax: [phone]

## 2023-08-03 ENCOUNTER — TELEPHONE (OUTPATIENT)
Dept: SURGERY | Facility: CLINIC | Age: 73
End: 2023-08-03
Payer: MEDICARE

## 2023-09-05 DIAGNOSIS — R03.0 ELEVATED BLOOD-PRESSURE READING WITHOUT DIAGNOSIS OF HYPERTENSION: Primary | ICD-10-CM

## 2023-09-05 RX ORDER — LOSARTAN POTASSIUM 25 MG/1
25 TABLET ORAL DAILY
Qty: 30 TABLET | Refills: 0 | Status: SHIPPED | OUTPATIENT
Start: 2023-09-05

## 2023-09-28 DIAGNOSIS — R03.0 ELEVATED BLOOD-PRESSURE READING WITHOUT DIAGNOSIS OF HYPERTENSION: ICD-10-CM

## 2023-09-28 RX ORDER — LOSARTAN POTASSIUM 25 MG/1
TABLET ORAL
Qty: 30 TABLET | Refills: 0 | Status: SHIPPED | OUTPATIENT
Start: 2023-09-28

## 2023-10-10 RX ORDER — MELOXICAM 7.5 MG/1
7.5 TABLET ORAL DAILY PRN
Qty: 60 TABLET | Refills: 0 | Status: SHIPPED | OUTPATIENT
Start: 2023-10-10

## 2023-10-12 RX ORDER — ATORVASTATIN CALCIUM 10 MG/1
TABLET, FILM COATED ORAL
Qty: 90 TABLET | Refills: 1 | Status: SHIPPED | OUTPATIENT
Start: 2023-10-12

## 2023-11-22 ENCOUNTER — OFFICE VISIT (OUTPATIENT)
Dept: FAMILY MEDICINE CLINIC | Facility: CLINIC | Age: 73
End: 2023-11-22
Payer: MEDICARE

## 2023-11-22 VITALS
DIASTOLIC BLOOD PRESSURE: 80 MMHG | HEART RATE: 78 BPM | SYSTOLIC BLOOD PRESSURE: 140 MMHG | HEIGHT: 69 IN | BODY MASS INDEX: 27.53 KG/M2 | WEIGHT: 185.9 LBS | OXYGEN SATURATION: 97 % | TEMPERATURE: 98.5 F

## 2023-11-22 DIAGNOSIS — K21.9 GASTROESOPHAGEAL REFLUX DISEASE, UNSPECIFIED WHETHER ESOPHAGITIS PRESENT: ICD-10-CM

## 2023-11-22 DIAGNOSIS — G25.0 ESSENTIAL TREMOR: ICD-10-CM

## 2023-11-22 DIAGNOSIS — R03.0 ELEVATED BLOOD-PRESSURE READING WITHOUT DIAGNOSIS OF HYPERTENSION: ICD-10-CM

## 2023-11-22 PROCEDURE — 99214 OFFICE O/P EST MOD 30 MIN: CPT | Performed by: NURSE PRACTITIONER

## 2023-11-22 RX ORDER — LOSARTAN POTASSIUM 25 MG/1
25 TABLET ORAL DAILY
Qty: 90 TABLET | Refills: 1 | Status: SHIPPED | OUTPATIENT
Start: 2023-11-22

## 2023-11-22 RX ORDER — PANTOPRAZOLE SODIUM 40 MG/1
40 TABLET, DELAYED RELEASE ORAL DAILY
Qty: 90 TABLET | Refills: 1 | Status: SHIPPED | OUTPATIENT
Start: 2023-11-22

## 2023-11-22 RX ORDER — CLONAZEPAM 0.5 MG/1
0.5 TABLET ORAL DAILY PRN
Qty: 90 TABLET | Refills: 0 | Status: SHIPPED | OUTPATIENT
Start: 2023-11-22

## 2023-11-22 RX ORDER — ATORVASTATIN CALCIUM 10 MG/1
10 TABLET, FILM COATED ORAL DAILY
Qty: 90 TABLET | Refills: 1 | Status: SHIPPED | OUTPATIENT
Start: 2023-11-22

## 2023-11-22 RX ORDER — MELOXICAM 15 MG/1
15 TABLET ORAL DAILY
Qty: 90 TABLET | Refills: 1 | Status: SHIPPED | OUTPATIENT
Start: 2023-11-22

## 2023-11-22 NOTE — PROGRESS NOTES
Chief Complaint  medications (BP possibility.  Been off Losartan (2 weeks).  Meloxicam not working well at 7.5 mg, questions on staying long term.)    Subjective        Paul Sterling presents to [facility]  History of Present Illness    Paul Sterling is a 73-year-old male who presents today for medication follow-up.    The patient reports he is doing well. He inquires about his blood pressure and states he has been off of losartan because [facility] could not fill it. He notes when he started losartan, his blood pressure was running  around 155/95 mmHg. He adds he checks his blood pressure regularly at home, and it is an average. He states sometimes it is a little lower and sometimes a little higher than that. He states when he goes to the dentist, they do not get anything near that high and adds he has a second blood pressure electronic device, and it is a good one.     He states he is not sure if he should go back on losartan. and reports he was not on losartan when his blood pressure was elevated. He notes the last 2 weeks he has been off losartan; his blood pressure has been reading about the same as that. He notes his systolic has been around 150 mmHg and his diastolic has been in the low 90s mmHg.    The patient noted he is interested in obtaining a new prescription for atorvastatin 90 mg, meloxicam 7.5 mg, Protonix, and Myrbetriq. He states the meloxicam 7.5 mg is not working well for him and notes he was on 15 mg, and it was doing good. He adds he asked the hand specialist, and they told him to talk to his primary care provider. He confirms Dr. Bradford fills his clonazepam once a year and notes he does not remember the last time he had it filled. He states he only uses it when he has trouble with the tremor in his hand and reports it has gotten way better.    The patient reports he does not usually obtain the influenza vaccine and states he is avoiding vaccines as much as he  "can. He notes he does not plan on receiving any more COVID-19 vaccines.    Objective   Vital Signs:  /80   Pulse 78   Temp 98.5 °F (36.9 °C) (Temporal)   Ht 175.3 cm (69.02\")   Wt 84.3 kg (185 lb 14.4 oz)   SpO2 97%   BMI 27.44 kg/m²   Estimated body mass index is 27.44 kg/m² as calculated from the following:    Height as of this encounter: 175.3 cm (69.02\").    Weight as of this encounter: 84.3 kg (185 lb 14.4 oz).            Physical Exam  Vitals reviewed.   Constitutional:       General: He is not in acute distress.     Appearance: He is well-developed. He is not diaphoretic.   Cardiovascular:      Rate and Rhythm: Normal rate and regular rhythm.      Heart sounds: Normal heart sounds. No murmur heard.     No friction rub. No gallop.   Pulmonary:      Effort: Pulmonary effort is normal. No respiratory distress.      Breath sounds: Normal breath sounds. No wheezing or rales.   Musculoskeletal:      Cervical back: Neck supple.   Skin:     General: Skin is warm and dry.   Neurological:      Mental Status: He is alert and oriented to person, place, and time.        Result Review :                  Assessment and Plan   Diagnoses and all orders for this visit:    1. Elevated blood-pressure reading without diagnosis of hypertension  -     losartan (COZAAR) 25 MG tablet; Take 1 tablet by mouth Daily.  Dispense: 90 tablet; Refill: 1    2. Gastroesophageal reflux disease, unspecified whether esophagitis present  -     pantoprazole (PROTONIX) 40 MG EC tablet; Take 1 tablet by mouth Daily.  Dispense: 90 tablet; Refill: 1    3. Essential tremor  -     clonazePAM (KlonoPIN) 0.5 MG tablet; Take 1 tablet by mouth Daily As Needed (HAND TREMORS).  Dispense: 90 tablet; Refill: 0    Other orders  -     atorvastatin (LIPITOR) 10 MG tablet; Take 1 tablet by mouth Daily.  Dispense: 90 tablet; Refill: 1  -     meloxicam (MOBIC) 15 MG tablet; Take 1 tablet by mouth Daily.  Dispense: 90 tablet; Refill: 1      General health " maintenance  - The patient presents today for a follow up to review his medication regimen. The patient was advised to begin taking his hypertension medication once again and continue taking his medication for arthritic pain.    1. Health maintenance and medication review  - An order was placed for refill prescriptions for losartan 25 mg, atorvastatin 20 mg, Protonix 40 mg, clonazepam 0.5 mg as needed, and meloxicam 15 mg. The patient's dosage for meloxicam was increased from 7.5 mg to 15 mg.       Follow Up   No follow-ups on file.  Patient was given instructions and counseling regarding his condition or for health maintenance advice. Please see specific information pulled into the AVS if appropriate.       Transcribed from ambient dictation for ANALI Valdez by Chester Huerta.  11/22/23   09:09 EST    Patient or patient representative verbalized consent to the visit recording.  I have personally performed the services described in this document as transcribed by the above individual, and it is both accurate and complete.   Answers submitted by the patient for this visit:  Primary Reason for Visit (Submitted on 11/20/2023)  What is the primary reason for your visit?: Other  Other (Submitted on 11/20/2023)  Please describe your symptoms.: 1)   Need blood pressure checked and consult whether to continue Losartin, 2)  Consult on Meloxicam...7.5 mg not working, go back to 15mg  long term?, 3)  Need new prescriptions for all my meds sent to [facility]:  Losartin 25, Meloxicam 7.5 or 15, ,      Pantoprazole 40, Atorvastatin 10.   They tried and were unable to get [facility].  Have you had these symptoms before?: Yes  How long have you been having these symptoms?: Greater than 2 weeks  Please list any medications you are currently taking for this condition.: see comment

## 2023-12-14 ENCOUNTER — ANESTHESIA EVENT (OUTPATIENT)
Dept: GASTROENTEROLOGY | Facility: HOSPITAL | Age: 73
End: 2023-12-14
Payer: MEDICARE

## 2023-12-14 ENCOUNTER — ANESTHESIA (OUTPATIENT)
Dept: GASTROENTEROLOGY | Facility: HOSPITAL | Age: 73
End: 2023-12-14
Payer: MEDICARE

## 2023-12-14 ENCOUNTER — HOSPITAL ENCOUNTER (OUTPATIENT)
Facility: HOSPITAL | Age: 73
Setting detail: HOSPITAL OUTPATIENT SURGERY
Discharge: HOME | End: 2023-12-14
Attending: SURGERY | Admitting: SURGERY
Payer: MEDICARE

## 2023-12-14 VITALS
WEIGHT: 182 LBS | OXYGEN SATURATION: 99 % | HEART RATE: 63 BPM | SYSTOLIC BLOOD PRESSURE: 99 MMHG | BODY MASS INDEX: 26.96 KG/M2 | HEIGHT: 69 IN | DIASTOLIC BLOOD PRESSURE: 71 MMHG | RESPIRATION RATE: 11 BRPM

## 2023-12-14 PROCEDURE — 25810000003 LACTATED RINGERS PER 1000 ML: Performed by: SURGERY

## 2023-12-14 PROCEDURE — G0105 COLORECTAL SCRN; HI RISK IND: HCPCS | Performed by: SURGERY

## 2023-12-14 PROCEDURE — S0260 H&P FOR SURGERY: HCPCS | Performed by: SURGERY

## 2023-12-14 PROCEDURE — 25810000003 LACTATED RINGERS PER 1000 ML: Performed by: ANESTHESIOLOGY

## 2023-12-14 PROCEDURE — 25010000002 PROPOFOL 10 MG/ML EMULSION: Performed by: ANESTHESIOLOGY

## 2023-12-14 PROCEDURE — 43235 EGD DIAGNOSTIC BRUSH WASH: CPT | Performed by: SURGERY

## 2023-12-14 PROCEDURE — 25010000002 PHENYLEPHRINE 10 MG/ML SOLUTION: Performed by: ANESTHESIOLOGY

## 2023-12-14 RX ORDER — SODIUM CHLORIDE 0.9 % (FLUSH) 0.9 %
10 SYRINGE (ML) INJECTION EVERY 12 HOURS SCHEDULED
Status: DISCONTINUED | OUTPATIENT
Start: 2023-12-14 | End: 2023-12-14 | Stop reason: HOSPADM

## 2023-12-14 RX ORDER — SODIUM CHLORIDE, SODIUM LACTATE, POTASSIUM CHLORIDE, CALCIUM CHLORIDE 600; 310; 30; 20 MG/100ML; MG/100ML; MG/100ML; MG/100ML
INJECTION, SOLUTION INTRAVENOUS CONTINUOUS PRN
Status: DISCONTINUED | OUTPATIENT
Start: 2023-12-14 | End: 2023-12-14 | Stop reason: SURG

## 2023-12-14 RX ORDER — SODIUM CHLORIDE, SODIUM LACTATE, POTASSIUM CHLORIDE, CALCIUM CHLORIDE 600; 310; 30; 20 MG/100ML; MG/100ML; MG/100ML; MG/100ML
30 INJECTION, SOLUTION INTRAVENOUS CONTINUOUS PRN
Status: DISCONTINUED | OUTPATIENT
Start: 2023-12-14 | End: 2023-12-14 | Stop reason: HOSPADM

## 2023-12-14 RX ORDER — LIDOCAINE HYDROCHLORIDE 20 MG/ML
INJECTION, SOLUTION INFILTRATION; PERINEURAL AS NEEDED
Status: DISCONTINUED | OUTPATIENT
Start: 2023-12-14 | End: 2023-12-14 | Stop reason: SURG

## 2023-12-14 RX ORDER — SODIUM CHLORIDE 0.9 % (FLUSH) 0.9 %
10 SYRINGE (ML) INJECTION AS NEEDED
Status: DISCONTINUED | OUTPATIENT
Start: 2023-12-14 | End: 2023-12-14 | Stop reason: HOSPADM

## 2023-12-14 RX ORDER — PROPOFOL 10 MG/ML
VIAL (ML) INTRAVENOUS AS NEEDED
Status: DISCONTINUED | OUTPATIENT
Start: 2023-12-14 | End: 2023-12-14 | Stop reason: SURG

## 2023-12-14 RX ORDER — PHENYLEPHRINE HYDROCHLORIDE 10 MG/ML
INJECTION INTRAVENOUS AS NEEDED
Status: DISCONTINUED | OUTPATIENT
Start: 2023-12-14 | End: 2023-12-14 | Stop reason: SURG

## 2023-12-14 RX ORDER — SODIUM CHLORIDE 9 MG/ML
40 INJECTION, SOLUTION INTRAVENOUS AS NEEDED
Status: DISCONTINUED | OUTPATIENT
Start: 2023-12-14 | End: 2023-12-14 | Stop reason: HOSPADM

## 2023-12-14 RX ORDER — GLYCOPYRROLATE 0.2 MG/ML
INJECTION INTRAMUSCULAR; INTRAVENOUS AS NEEDED
Status: DISCONTINUED | OUTPATIENT
Start: 2023-12-14 | End: 2023-12-14 | Stop reason: SURG

## 2023-12-14 RX ADMIN — PROPOFOL 100 MG: 10 INJECTION, EMULSION INTRAVENOUS at 09:28

## 2023-12-14 RX ADMIN — GLYCOPYRROLATE 0.2 MG: 0.2 INJECTION INTRAMUSCULAR; INTRAVENOUS at 09:23

## 2023-12-14 RX ADMIN — SODIUM CHLORIDE, POTASSIUM CHLORIDE, SODIUM LACTATE AND CALCIUM CHLORIDE 30 ML/HR: 600; 310; 30; 20 INJECTION, SOLUTION INTRAVENOUS at 08:28

## 2023-12-14 RX ADMIN — PHENYLEPHRINE HYDROCHLORIDE 100 MCG: 10 INJECTION INTRAVENOUS at 10:06

## 2023-12-14 RX ADMIN — PHENYLEPHRINE HYDROCHLORIDE 100 MCG: 10 INJECTION INTRAVENOUS at 10:00

## 2023-12-14 RX ADMIN — PROPOFOL 200 MCG/KG/MIN: 10 INJECTION, EMULSION INTRAVENOUS at 09:26

## 2023-12-14 RX ADMIN — SODIUM CHLORIDE, POTASSIUM CHLORIDE, SODIUM LACTATE AND CALCIUM CHLORIDE: 600; 310; 30; 20 INJECTION, SOLUTION INTRAVENOUS at 09:20

## 2023-12-14 RX ADMIN — LIDOCAINE HYDROCHLORIDE 60 MG: 20 INJECTION, SOLUTION INFILTRATION; PERINEURAL at 09:23

## 2023-12-14 RX ADMIN — PROPOFOL 120 MG: 10 INJECTION, EMULSION INTRAVENOUS at 09:25

## 2023-12-14 NOTE — ANESTHESIA POSTPROCEDURE EVALUATION
"Patient: Paul Sterling    Procedure Summary       Date: 12/14/23 [facility]    Anesthesia Start: 0920 Anesthesia Stop: 1016    Procedures:       COLONOSCOPY into cecum      ESOPHAGOGASTRODUODENOSCOPY (Esophagus) Diagnosis:       Family history of colon cancer      (Family history of colon cancer [Z80.0])    Surgeons: Stuart Hernandez MD Provider: Antonietta Carson MD    Anesthesia Type: MAC ASA Status: 3            Anesthesia Type: MAC    Vitals  Vitals Value Taken Time   /90 12/14/23 1026   Temp     Pulse 70 12/14/23 1031   Resp 14 12/14/23 1026   SpO2 98 % 12/14/23 1031   Vitals shown include unfiled device data.        Post Anesthesia Care and Evaluation    Patient location during evaluation: bedside  Patient participation: complete - patient participated  Level of consciousness: awake  Pain management: adequate    Airway patency: patent  Anesthetic complications: No anesthetic complications    Cardiovascular status: acceptable  Respiratory status: acceptable  Hydration status: acceptable    Comments: /90 (BP Location: Left arm, Patient Position: Lying)   Pulse 66   Resp 14   Ht 175.3 cm (69\")   Wt 82.6 kg (182 lb)   SpO2 100%   BMI 26.88 kg/m²     "

## 2023-12-14 NOTE — OP NOTE
Operative Note :   Stuart Hernandez MD    Paul WETZEL Hallie  1950    Procedure Date: 12/14/23    Pre-op Diagnosis:  Personal history of colon polyps  Family history of colon cancer  History of Noonan's disease of the esophagus    Post-op Diagnosis:  Normal upper endoscopy  Grossly normal colon  Poor prep    Procedure:   Esophagogastroduodenoscopy  Colonoscopy to cecum    Surgeon: Stuart Hernandez MD      Anesthesia: MAC    Indications:  73-year-old gentleman with a family history of colon cancer in his mother.  Last colonoscopy done around 2 years ago with finding of 8 polyps at that time.  In addition he has a history of Noonan's disease of the esophagus in the past, he has undergone Nissen fundoplication, still has some reflux symptoms but no Noonan's has been identified on his last couple of endoscopies.    Findings:   Normal upper endoscopy with no evidence of Noonan's disease  Grossly normal colon with poor prep requiring extensive irrigation and suctioning    Recommendations:   Repeat colonoscopy in 3 years based on poor prep and past history of polyps    Description of procedure:    After obtaining informed consent, patient was brought to the endoscopy suite and was sedated.  The flexible gastroscope was introduced through the mouth, passed the cricopharyngeus into the esophagus, beyond the GE junction into the stomach and then passed the pylorus into the duodenum.  The junction of the second and third portion was reached.  The duodenum was normal.  Scope was pulled back past the major papilla, the duodenal bulb and pyloric channel all of which were normal.  The scope was pulled back into the stomach and the stomach was insufflated.  The mucosa had a normal appearance.  Retroflexion showed evidence of prior Nissen fundoplication.  The scope was pulled back to the level of the GE junction and there was no significant findings.  The esophagus was unremarkable on withdrawal of the scope.    The patient  was turned around.  Digital rectal exam was undertaken and showed evidence of some liquid and formed stool on the glove.  The colonoscope was introduced to the rectum and was slowly and carefully advanced to the level of the cecum under direct visualization.  Next there was an extensive irrigation and suctioning in order to gain reasonable visualization.  Throughout the colon there were no mass lesions or diverticula identified.  It is possible due to this poor prep that there could be some small polyps which were missed, but certainly no gross mass or lesion.    Stuart Hernandez MD  General and Endoscopic Surgery  [facility]    [address], [address]  [place], [place], [address]  P: [phone]  F: [phone]

## 2023-12-14 NOTE — ANESTHESIA PREPROCEDURE EVALUATION
Anesthesia Evaluation     NPO Solid Status: > 8 hours  NPO Liquid Status: > 8 hours           Airway   Mallampati: II  TM distance: >3 FB  No difficulty expected  Dental      Pulmonary    Cardiovascular   Exercise tolerance: good (4-7 METS)    (+) hypertension 2 medications or greater, hyperlipidemia      Neuro/Psych  (+) dizziness/light headedness, tremors  GI/Hepatic/Renal/Endo    (+) GERD, GI bleeding     Musculoskeletal     Abdominal    Substance History      OB/GYN          Other   arthritis,                       Anesthesia Plan    ASA 3     MAC     intravenous induction     Anesthetic plan, risks, benefits, and alternatives have been provided, discussed and informed consent has been obtained with: patient.    Plan discussed with CRNA.        CODE STATUS:

## 2023-12-14 NOTE — DISCHARGE INSTRUCTIONS
For the next 24 hours patient needs to be with a responsible adult.    For 24 hours DO NOT drive, operate machinery, appliances, drink alcohol, make important decisions or sign legal documents.    Start with a light or bland diet if you are feeling sick to your stomach otherwise advance to regular diet as tolerated.    Follow recommendations on procedure report if provided by your doctor.    Call Dr Tam for problems [phone]    Problems may include but not limited to: large amounts of bleeding, trouble breathing, repeated vomiting, severe unrelieved pain, fever or chills.

## 2023-12-14 NOTE — H&P
Cc: Family history of colon cancer, history of Noonan's esophagus    History of presenting illness: 73-year-old gentleman presenting for consideration of colonoscopy.  Last scope done 2021 with finding of polyps.  History of colon cancer in his mother.  Also has a history of Noonan's esophagus, last EGD having been done in 2021    Past medical history: Noonan's disease, hypertension, hyperlipidemia    Past surgical history: Laparoscopic Nissen fundoplication, inguinal hernia repair bilaterally 2010 and 2014, umbilical hernia repair 2010 and repeated 2022, most recent EGD and colonoscopy July 2021, cholecystectomy 2010    Medications: Reviewed and reconciled in    Allergies: None known    Social history: Non-smoker    Family history: Positive for colon cancer in his mother    Physical exam:  Body mass index: 26.9  General: Awake and alert, no distress  Head: Normocephalic, atraumatic  Neck: Supple, trachea midline  Eyes: Extraocular movements intact, no icterus  Respiratory: No use of accessory muscles, good bilateral chest expansion  Abdomen: Soft, benign, no hernia felt  Skin: Warm and dry        Assessment and plan:  -History of Noonan's esophagus  Personal history of colon polyps, family history of colon cancer  -Plan for EGD and colonoscopy today, risks include bleeding and perforation, patient agreeable to proceed    Stuart Hernandez MD  General and Endoscopic Surgery  [facility]    [address], [address]  [place], [place], [address]  P: [phone]  F: [phone]

## 2023-12-19 RX ORDER — MELOXICAM 7.5 MG/1
7.5 TABLET ORAL DAILY PRN
Qty: 60 TABLET | Refills: 0 | Status: SHIPPED | OUTPATIENT
Start: 2023-12-19

## 2023-12-19 NOTE — TELEPHONE ENCOUNTER
Rx Refill Note  Requested Prescriptions     Pending Prescriptions Disp Refills    meloxicam (MOBIC) 7.5 MG tablet [Pharmacy Med Name: MELOXICAM 7.5 MG TABLET] 60 tablet 0     Sig: TAKE 1 TABLET BY MOUTH DAILY AS NEEDED FOR MODERATE PAIN      Last office visit with prescribing clinician: 2/15/2022   Last telemedicine visit with prescribing clinician: Visit date not found   Next office visit with prescribing clinician: 5/28/2024                         Would you like a call back once the refill request has been completed: [] Yes [] No    If the office needs to give you a call back, can they leave a voicemail: [] Yes [] No    Jl Lamas MA  12/19/23, 14:33 EST

## 2024-03-26 ENCOUNTER — TRANSCRIBE ORDERS (OUTPATIENT)
Dept: ADMINISTRATIVE | Facility: HOSPITAL | Age: 74
End: 2024-03-26
Payer: MEDICARE

## 2024-03-26 ENCOUNTER — LAB (OUTPATIENT)
Dept: LAB | Facility: HOSPITAL | Age: 74
End: 2024-03-26
Payer: MEDICARE

## 2024-03-26 DIAGNOSIS — L30.9 ACUTE DERMATITIS: ICD-10-CM

## 2024-03-26 DIAGNOSIS — L30.9 ACUTE DERMATITIS: Primary | ICD-10-CM

## 2024-03-26 PROCEDURE — 86038 ANTINUCLEAR ANTIBODIES: CPT

## 2024-03-26 PROCEDURE — 86235 NUCLEAR ANTIGEN ANTIBODY: CPT

## 2024-03-26 PROCEDURE — 36415 COLL VENOUS BLD VENIPUNCTURE: CPT

## 2024-03-27 LAB
ENA SS-A AB SER-ACNC: <0.2 AI (ref 0–0.9)
ENA SS-B AB SER-ACNC: <0.2 AI (ref 0–0.9)

## 2024-03-28 LAB — ANA SER QL IF: NEGATIVE

## 2024-05-20 DIAGNOSIS — R03.0 ELEVATED BLOOD-PRESSURE READING WITHOUT DIAGNOSIS OF HYPERTENSION: ICD-10-CM

## 2024-05-20 DIAGNOSIS — K21.9 GASTROESOPHAGEAL REFLUX DISEASE, UNSPECIFIED WHETHER ESOPHAGITIS PRESENT: ICD-10-CM

## 2024-05-20 NOTE — TELEPHONE ENCOUNTER
Rx Refill Note  Requested Prescriptions     Pending Prescriptions Disp Refills    atorvastatin (LIPITOR) 10 MG tablet [Pharmacy Med Name: ATORVASTATIN 10MG TABLETS] 90 tablet 1     Sig: TAKE 1 TABLET BY MOUTH DAILY    losartan (COZAAR) 25 MG tablet [Pharmacy Med Name: LOSARTAN 25MG TABLETS] 90 tablet 1     Sig: TAKE 1 TABLET BY MOUTH DAILY    pantoprazole (PROTONIX) 40 MG EC tablet [Pharmacy Med Name: PANTOPRAZOLE 40MG TABLETS] 90 tablet 1     Sig: TAKE 1 TABLET BY MOUTH DAILY    meloxicam (MOBIC) 15 MG tablet [Pharmacy Med Name: MELOXICAM 15MG TABLETS] 90 tablet 1     Sig: TAKE 1 TABLET BY MOUTH DAILY      Last office visit with prescribing clinician: 11/22/2023   Last telemedicine visit with prescribing clinician: Visit date not found   Next office visit with prescribing clinician: Visit date not found                         Would you like a call back once the refill request has been completed: [] Yes [] No    If the office needs to give you a call back, can they leave a voicemail: [] Yes [] No    Jl Lamas MA  05/20/24, 08:24 EDT

## 2024-05-21 RX ORDER — ATORVASTATIN CALCIUM 10 MG/1
10 TABLET, FILM COATED ORAL DAILY
Qty: 90 TABLET | Refills: 1 | Status: SHIPPED | OUTPATIENT
Start: 2024-05-21

## 2024-05-21 RX ORDER — MELOXICAM 15 MG/1
15 TABLET ORAL DAILY
Qty: 90 TABLET | Refills: 1 | Status: SHIPPED | OUTPATIENT
Start: 2024-05-21

## 2024-05-21 RX ORDER — LOSARTAN POTASSIUM 25 MG/1
25 TABLET ORAL DAILY
Qty: 90 TABLET | Refills: 1 | Status: SHIPPED | OUTPATIENT
Start: 2024-05-21

## 2024-05-21 RX ORDER — PANTOPRAZOLE SODIUM 40 MG/1
40 TABLET, DELAYED RELEASE ORAL DAILY
Qty: 90 TABLET | Refills: 1 | Status: SHIPPED | OUTPATIENT
Start: 2024-05-21

## 2024-05-28 ENCOUNTER — TELEMEDICINE (OUTPATIENT)
Dept: FAMILY MEDICINE CLINIC | Facility: CLINIC | Age: 74
End: 2024-05-28
Payer: MEDICARE

## 2024-05-28 DIAGNOSIS — G47.09 OTHER INSOMNIA: ICD-10-CM

## 2024-05-28 DIAGNOSIS — M62.89 MUSCLE FATIGUE: ICD-10-CM

## 2024-05-28 DIAGNOSIS — E78.00 PURE HYPERCHOLESTEROLEMIA: Primary | ICD-10-CM

## 2024-05-28 DIAGNOSIS — K21.9 GASTROESOPHAGEAL REFLUX DISEASE, UNSPECIFIED WHETHER ESOPHAGITIS PRESENT: ICD-10-CM

## 2024-05-28 DIAGNOSIS — I10 ESSENTIAL HYPERTENSION: ICD-10-CM

## 2024-05-28 DIAGNOSIS — R61 NIGHT SWEATS: ICD-10-CM

## 2024-05-28 DIAGNOSIS — Z12.5 PROSTATE CANCER SCREENING: ICD-10-CM

## 2024-05-28 DIAGNOSIS — R53.83 FATIGUE, UNSPECIFIED TYPE: ICD-10-CM

## 2024-05-28 DIAGNOSIS — N32.81 OVERACTIVE BLADDER: ICD-10-CM

## 2024-05-28 DIAGNOSIS — L29.9 ITCHING: ICD-10-CM

## 2024-05-28 PROCEDURE — 99214 OFFICE O/P EST MOD 30 MIN: CPT | Performed by: INTERNAL MEDICINE

## 2024-05-28 PROCEDURE — G0439 PPPS, SUBSEQ VISIT: HCPCS | Performed by: INTERNAL MEDICINE

## 2024-05-28 PROCEDURE — 1126F AMNT PAIN NOTED NONE PRSNT: CPT | Performed by: INTERNAL MEDICINE

## 2024-05-28 PROCEDURE — 1160F RVW MEDS BY RX/DR IN RCRD: CPT | Performed by: INTERNAL MEDICINE

## 2024-05-28 PROCEDURE — 1159F MED LIST DOCD IN RCRD: CPT | Performed by: INTERNAL MEDICINE

## 2024-05-28 RX ORDER — HYDROXYZINE HYDROCHLORIDE 25 MG/1
25 TABLET, FILM COATED ORAL NIGHTLY PRN
Qty: 90 TABLET | Refills: 2 | Status: SHIPPED | OUTPATIENT
Start: 2024-05-28

## 2024-05-28 NOTE — PROGRESS NOTES
"Chief Complaint  No chief complaint on file.    Subjective    {Problem List  Visit Diagnosis   Encounters  Notes  Medications  Labs  Result Review Imaging  Media :23}    Paul Sterling presents to [facility]  History of Present Illness    Objective   Vital Signs:  There were no vitals taken for this visit.  Estimated body mass index is 26.88 kg/m² as calculated from the following:    Height as of 12/14/23: 175.3 cm (69\").    Weight as of 12/14/23: 82.6 kg (182 lb).       {BMI is >= 25 and <30. (Overweight) The following options were offered after discussion; (Optional):86786}      Physical Exam   Result Review :{Labs  Result Review  Imaging  Med Tab  Media  Procedures :23}    {The following data was reviewed by (Optional):27031}  {Ambulatory Labs (Optional):69908}  {Data reviewed (Optional):64606:::1}             Assessment and Plan {CC Problem List  Visit Diagnosis   ROS  Review (Popup)  Health Maintenance  Quality  BestPractice  Medications  SmartSets  SnapShot Encounters  Media :23}    There are no diagnoses linked to this encounter.       {Time Spent (Optional):94837}  Follow Up {Instructions Charge Capture  Follow-up Communications :23}    No follow-ups on file.  Patient was given instructions and counseling regarding his condition or for health maintenance advice. Please see specific information pulled into the AVS if appropriate.         "

## 2024-05-28 NOTE — PROGRESS NOTES
The ABCs of the Annual Wellness Visit  Subsequent Medicare Wellness Visit    Subjective    Paul Sterling is a 74 y.o. male who presents for a Subsequent Medicare Wellness Visit.    The following portions of the patient's history were reviewed and   updated as appropriate: allergies, current medications, past family history, past medical history, past social history, past surgical history, and problem list.    Compared to one year ago, the patient feels his physical   health is better.    Compared to one year ago, the patient feels his mental   health is the same.    Recent Hospitalizations:  He was not admitted to the hospital during the last year.       Current Medical Providers:  Patient Care Team:  Ofelia Bradford MD as PCP - General (Internal Medicine)    Outpatient Medications Prior to Visit   Medication Sig Dispense Refill    atorvastatin (LIPITOR) 10 MG tablet TAKE 1 TABLET BY MOUTH DAILY 90 tablet 1    clonazePAM (KlonoPIN) 0.5 MG tablet Take 1 tablet by mouth Daily As Needed (HAND TREMORS). 90 tablet 0    desonide (DESOWEN) 0.05 % cream Apply 1 Application topically to the appropriate area as directed 2 (Two) Times a Day As Needed for Irritation.      Glucosamine-Chondroit-Vit C-Mn (GLUCOSAMINE CHONDR 1500 COMPLX PO) Take 1 tablet by mouth Daily.      hydrocortisone 1 % cream Apply 1 Application topically to the appropriate area as directed As Needed for Irritation.      hydrocortisone 2.5 % cream Apply 1 Application topically to the appropriate area as directed As Needed for Irritation.  3    losartan (COZAAR) 25 MG tablet TAKE 1 TABLET BY MOUTH DAILY 90 tablet 1    meloxicam (MOBIC) 15 MG tablet TAKE 1 TABLET BY MOUTH DAILY 90 tablet 1    methylcellulose, Laxative, (CITRUCEL) 500 MG tablet tablet Take 6 tablets by mouth Every Morning.      Mirabegron ER (Myrbetriq) 50 MG tablet sustained-release 24 hour 24 hr tablet Take 50 mg by mouth Daily. 90 tablet 1    Multiple Vitamins-Minerals (PRESERVISION  AREDS 2 PO) Take 2 capsules by mouth Daily.      pantoprazole (PROTONIX) 40 MG EC tablet TAKE 1 TABLET BY MOUTH DAILY 90 tablet 1    polyethyl glycol-propyl glycol (Systane) 0.4-0.3 % solution ophthalmic solution (artificial tears) Administer 1 drop to both eyes Every 1 (One) Hour As Needed (DRYNESS).      triamcinolone (KENALOG) 0.1 % ointment Apply 1 Application topically to the appropriate area as directed 3 (Three) Times a Day As Needed for Irritation.      Loratadine (CLARITIN PO) Take 10 mg by mouth Daily As Needed (ALLERGIES).      meloxicam (MOBIC) 7.5 MG tablet TAKE 1 TABLET BY MOUTH DAILY AS NEEDED FOR MODERATE PAIN 60 tablet 0     No facility-administered medications prior to visit.       No opioid medication identified on active medication list. I have reviewed chart for other potential  high risk medication/s and harmful drug interactions in the elderly.        Aspirin is not on active medication list.  Aspirin use is not indicated based on review of current medical condition/s. Risk of harm outweighs potential benefits.  .    Patient Active Problem List   Diagnosis    Well adult exam    Rash    Tremor of both hands    Insomnia    Encounter for immunization    Hyperlipidemia    Elevated blood-pressure reading without diagnosis of hypertension    Rectal bleeding    Family history of colon cancer    Family history of colonic polyps    Colon cancer screening    History of Noonan's esophagus    Gastroesophageal reflux disease    Polyp of colon    Cough    Diarrhea    Herniated lumbar intervertebral disc    DDD (degenerative disc disease), lumbar    Acute low back pain due to spinal disorder    Overactive bladder    Essential tremor    Umbilical hernia without obstruction and without gangrene     Advance Care Planning   Advance Care Planning     Advance Directive is not on file.  ACP discussion was held with the patient during this visit. Patient has an advance directive (not in EMR), copy requested.    "  Objective    There were no vitals filed for this visit.  Estimated body mass index is 26.88 kg/m² as calculated from the following:    Height as of 23: 175.3 cm (69\").    Weight as of 23: 82.6 kg (182 lb).    BMI is >= 25 and <30. (Overweight) The following options were offered after discussion;: exercise counseling/recommendations      Does the patient have evidence of cognitive impairment? No          HEALTH RISK ASSESSMENT    Smoking Status:  Social History     Tobacco Use   Smoking Status Never    Passive exposure: Never   Smokeless Tobacco Never     Alcohol Consumption:  Social History     Substance and Sexual Activity   Alcohol Use Yes    Alcohol/week: 5.0 standard drinks of alcohol    Types: 5 Cans of beer per week    Comment: weekly     Fall Risk Screen:    [facility] Fall Risk Assessment has not been completed.    Depression Screenin/23/2023    11:24 AM   PHQ-2/PHQ-9 Depression Screening   Little Interest or Pleasure in Doing Things 0-->not at all   Feeling Down, Depressed or Hopeless 0-->not at all   PHQ-9: Brief Depression Severity Measure Score 0       Health Habits and Functional and Cognitive Screenin/23/2023    11:24 AM   Functional & Cognitive Status   Do you have difficulty preparing food and eating? No   Do you have difficulty bathing yourself, getting dressed or grooming yourself? No   Do you have difficulty using the toilet? No   Do you have difficulty moving around from place to place? No   Do you have trouble with steps or getting out of a bed or a chair? No   Current Diet Well Balanced Diet   Dental Exam Up to date   Eye Exam Up to date   Exercise (times per week) 5 times per week   Current Exercises Include Walking   Do you need help using the phone?  No   Are you deaf or do you have serious difficulty hearing?  No   Do you need help to go to places out of walking distance? No   Do you need help shopping? No   Do you need help preparing meals?  No   Do you need " help with housework?  No   Do you need help with laundry? No   Do you need help taking your medications? No   Do you need help managing money? No   Do you ever drive or ride in a car without wearing a seat belt? No   Have you felt unusual stress, anger or loneliness in the last month? No   Who do you live with? Alone   If you need help, do you have trouble finding someone available to you? No   Have you been bothered in the last four weeks by sexual problems? No   Do you have difficulty concentrating, remembering or making decisions? No       Age-appropriate Screening Schedule:  Refer to the list below for future screening recommendations based on patient's age, sex and/or medical conditions. Orders for these recommended tests are listed in the plan section. The patient has been provided with a written plan.    Health Maintenance   Topic Date Due    RSV Vaccine - Adults (1 - 1-dose 60+ series) Never done    ZOSTER VACCINE (2 of 2) 07/27/2012    LIPID PANEL  05/19/2024    ANNUAL WELLNESS VISIT  05/23/2024    INFLUENZA VACCINE  08/01/2024    BMI FOLLOWUP  05/28/2025    TDAP/TD VACCINES (2 - Td or Tdap) 03/22/2028    COLORECTAL CANCER SCREENING  12/14/2028    HEPATITIS C SCREENING  Completed    Pneumococcal Vaccine 65+  Completed    COVID-19 Vaccine  Discontinued                  CMS Preventative Services Quick Reference  Risk Factors Identified During Encounter  Hearing Problem:  he is not interested in hearing aids at this time.  Immunizations Discussed/Encouraged: Prevnar 20 (Pneumococcal 20-valent conjugate) and RSV (Respiratory Syncytial Virus)  Dental Screening Recommended  Vision Screening Recommended  The above risks/problems have been discussed with the patient.  Pertinent information has been shared with the patient in the After Visit Summary.  An After Visit Summary and PPPS were made available to the patient.    Follow Up:   Next Medicare Wellness visit to be scheduled in 1 year.       Additional E&M Note  during same encounter follows:  Patient has multiple medical problems which are significant and separately identifiable that require additional work above and beyond the Medicare Wellness Visit.      Chief Complaint  Medicare Wellness-subsequent and Hypertension (Fatigue/)    Subjective        HPI  Paul Headleycasperserenecoretta is also being seen today for AWV and for f/u on HTN on losartan 25 mg qd, lipitor 10 mg qd for HL, GERD on protonix 40 mg qd.  Has been seeing Dr. Hanna for eczema and itching   he was tried on dupxient which helped the eczema which helped but the cost was very high.   He c/o itching off and on which comes and goes but will sometimes be worse in the night and prevents him from sleeping.  He still uses clonzepam for tremors but it is not often  doesn't need refills b/c we gave them last week.  C/o easily fatigues after 9 holes of golf, walking for exercise, house work.  No weight loss .  Appetite is good.  Itching seems to be worse after showering.  You have chosen to receive care through a telehealth visit.  Do you consent to use a video/audio connection for your medical care today? Yes         Objective   Vital Signs:  There were no vitals taken for this visit.    Physical Exam  Constitutional:       Appearance: He is well-developed.   HENT:      Head: Normocephalic and atraumatic.      Nose: Nose normal.   Eyes:      Conjunctiva/sclera: Conjunctivae normal.   Pulmonary:      Effort: Pulmonary effort is normal. No respiratory distress.   Neurological:      Mental Status: He is alert.   Psychiatric:         Behavior: Behavior normal.         Thought Content: Thought content normal.         Judgment: Judgment normal.                         Assessment and Plan   Diagnoses and all orders for this visit:    1. Pure hypercholesterolemia (Primary)  -     Comprehensive Metabolic Panel; Future  -     Lipid Panel With LDL / HDL Ratio; Future  -     TSH; Future  -     T4, Free; Future  -     CBC &  Differential; Future  -     PSA Screen; Future  -     CK; Future  -     Vitamin B12; Future  -     Folate; Future  -     Sedimentation rate, automated; Future  -     C-reactive protein; Future    2. Overactive bladder    3. Gastroesophageal reflux disease, unspecified whether esophagitis present    4. Other insomnia    5. Fatigue, unspecified type  -     Comprehensive Metabolic Panel; Future  -     Lipid Panel With LDL / HDL Ratio; Future  -     TSH; Future  -     T4, Free; Future  -     CBC & Differential; Future  -     PSA Screen; Future  -     CK; Future  -     Vitamin B12; Future  -     Folate; Future  -     Sedimentation rate, automated; Future  -     C-reactive protein; Future  -     CT Abdomen Pelvis With & Without Contrast; Future  -     CT Chest With Contrast; Future    6. Muscle fatigue  -     CK; Future    7. Essential hypertension  -     Comprehensive Metabolic Panel; Future  -     Lipid Panel With LDL / HDL Ratio; Future  -     TSH; Future  -     T4, Free; Future  -     CBC & Differential; Future  -     PSA Screen; Future  -     CK; Future  -     Vitamin B12; Future  -     Folate; Future  -     Sedimentation rate, automated; Future  -     C-reactive protein; Future    8. Prostate cancer screening  -     PSA Screen; Future    9. Night sweats  -     CT Abdomen Pelvis With & Without Contrast; Future  -     CT Chest With Contrast; Future    10. Itching  -     CT Abdomen Pelvis With & Without Contrast; Future  -     CT Chest With Contrast; Future    Other orders  -     hydrOXYzine (ATARAX) 25 MG tablet; Take 1 tablet by mouth At Night As Needed for Itching.  Dispense: 90 tablet; Refill: 2    Labs ordered  CT of abd, pelvis and chest due to night sweats, itching and fatigue  Check labs for inflammatory d/o.  Reviewed labs from derm.  Skin bx have been done.  Atarax for night time insomnia and itching.    Continue losartan 25 mg qd for HTN  Continue lipitor for HL  Continue protonix for GERD  Continue  myrbetriq for OAB       I spent 30 minutes caring for Paul on this date of service. This time includes time spent by me in the following activities:preparing for the visit, reviewing tests, obtaining and/or reviewing a separately obtained history, and performing a medically appropriate examination and/or evaluation   Follow Up   No follow-ups on file.  Patient was given instructions and counseling regarding his condition or for health maintenance advice. Please see specific information pulled into the AVS if appropriate.

## 2024-06-10 ENCOUNTER — LAB (OUTPATIENT)
Dept: LAB | Facility: HOSPITAL | Age: 74
End: 2024-06-10
Payer: MEDICARE

## 2024-06-10 DIAGNOSIS — M62.89 MUSCLE FATIGUE: ICD-10-CM

## 2024-06-10 DIAGNOSIS — Z12.5 PROSTATE CANCER SCREENING: ICD-10-CM

## 2024-06-10 DIAGNOSIS — E78.00 PURE HYPERCHOLESTEROLEMIA: ICD-10-CM

## 2024-06-10 DIAGNOSIS — R53.83 FATIGUE, UNSPECIFIED TYPE: ICD-10-CM

## 2024-06-10 DIAGNOSIS — I10 ESSENTIAL HYPERTENSION: ICD-10-CM

## 2024-06-10 LAB
ALBUMIN SERPL-MCNC: 4.4 G/DL (ref 3.5–5.2)
ALBUMIN/GLOB SERPL: 2.3 G/DL
ALP SERPL-CCNC: 58 U/L (ref 39–117)
ALT SERPL W P-5'-P-CCNC: 28 U/L (ref 1–41)
ANION GAP SERPL CALCULATED.3IONS-SCNC: 9.3 MMOL/L (ref 5–15)
AST SERPL-CCNC: 23 U/L (ref 1–40)
BASOPHILS # BLD MANUAL: 0.05 10*3/MM3 (ref 0–0.2)
BASOPHILS NFR BLD MANUAL: 1 % (ref 0–1.5)
BILIRUB SERPL-MCNC: 0.4 MG/DL (ref 0–1.2)
BUN SERPL-MCNC: 14 MG/DL (ref 8–23)
BUN/CREAT SERPL: 14.6 (ref 7–25)
CALCIUM SPEC-SCNC: 9.3 MG/DL (ref 8.6–10.5)
CHLORIDE SERPL-SCNC: 106 MMOL/L (ref 98–107)
CHOLEST SERPL-MCNC: 136 MG/DL (ref 0–200)
CK SERPL-CCNC: 245 U/L (ref 20–200)
CO2 SERPL-SCNC: 24.7 MMOL/L (ref 22–29)
CREAT SERPL-MCNC: 0.96 MG/DL (ref 0.76–1.27)
CRP SERPL-MCNC: <0.3 MG/DL (ref 0–0.5)
DEPRECATED RDW RBC AUTO: 44.3 FL (ref 37–54)
EGFRCR SERPLBLD CKD-EPI 2021: 83.5 ML/MIN/1.73
EOSINOPHIL # BLD MANUAL: 0.1 10*3/MM3 (ref 0–0.4)
EOSINOPHIL NFR BLD MANUAL: 2 % (ref 0.3–6.2)
ERYTHROCYTE [DISTWIDTH] IN BLOOD BY AUTOMATED COUNT: 13.2 % (ref 12.3–15.4)
ERYTHROCYTE [SEDIMENTATION RATE] IN BLOOD: <1 MM/HR (ref 0–20)
FOLATE SERPL-MCNC: >20 NG/ML (ref 4.78–24.2)
GIANT PLATELETS: ABNORMAL
GLOBULIN UR ELPH-MCNC: 1.9 GM/DL
GLUCOSE SERPL-MCNC: 106 MG/DL (ref 65–99)
HCT VFR BLD AUTO: 47.4 % (ref 37.5–51)
HDLC SERPL-MCNC: 56 MG/DL (ref 40–60)
HGB BLD-MCNC: 15.4 G/DL (ref 13–17.7)
LDLC SERPL CALC-MCNC: 69 MG/DL (ref 0–100)
LDLC/HDLC SERPL: 1.25 {RATIO}
LYMPHOCYTES # BLD MANUAL: 0.69 10*3/MM3 (ref 0.7–3.1)
LYMPHOCYTES NFR BLD MANUAL: 12 % (ref 5–12)
MCH RBC QN AUTO: 29.6 PG (ref 26.6–33)
MCHC RBC AUTO-ENTMCNC: 32.5 G/DL (ref 31.5–35.7)
MCV RBC AUTO: 91 FL (ref 79–97)
MONOCYTES # BLD: 0.59 10*3/MM3 (ref 0.1–0.9)
NEUTROPHILS # BLD AUTO: 3.51 10*3/MM3 (ref 1.7–7)
NEUTROPHILS NFR BLD MANUAL: 71 % (ref 42.7–76)
PLATELET # BLD AUTO: 155 10*3/MM3 (ref 140–450)
PMV BLD AUTO: 13.2 FL (ref 6–12)
POTASSIUM SERPL-SCNC: 4.7 MMOL/L (ref 3.5–5.2)
PROT SERPL-MCNC: 6.3 G/DL (ref 6–8.5)
PSA SERPL-MCNC: 0.51 NG/ML (ref 0–4)
RBC # BLD AUTO: 5.21 10*6/MM3 (ref 4.14–5.8)
RBC MORPH BLD: NORMAL
SODIUM SERPL-SCNC: 140 MMOL/L (ref 136–145)
T4 FREE SERPL-MCNC: 1.09 NG/DL (ref 0.92–1.68)
TRIGL SERPL-MCNC: 50 MG/DL (ref 0–150)
TSH SERPL DL<=0.05 MIU/L-ACNC: 3.19 UIU/ML (ref 0.27–4.2)
VARIANT LYMPHS NFR BLD MANUAL: 14 % (ref 19.6–45.3)
VIT B12 BLD-MCNC: 500 PG/ML (ref 211–946)
VLDLC SERPL-MCNC: 11 MG/DL (ref 5–40)
WBC MORPH BLD: NORMAL
WBC NRBC COR # BLD AUTO: 4.95 10*3/MM3 (ref 3.4–10.8)

## 2024-06-10 PROCEDURE — 85652 RBC SED RATE AUTOMATED: CPT

## 2024-06-10 PROCEDURE — 84439 ASSAY OF FREE THYROXINE: CPT

## 2024-06-10 PROCEDURE — 80053 COMPREHEN METABOLIC PANEL: CPT

## 2024-06-10 PROCEDURE — 85007 BL SMEAR W/DIFF WBC COUNT: CPT

## 2024-06-10 PROCEDURE — 86140 C-REACTIVE PROTEIN: CPT

## 2024-06-10 PROCEDURE — 82607 VITAMIN B-12: CPT

## 2024-06-10 PROCEDURE — 36415 COLL VENOUS BLD VENIPUNCTURE: CPT

## 2024-06-10 PROCEDURE — 85025 COMPLETE CBC W/AUTO DIFF WBC: CPT

## 2024-06-10 PROCEDURE — 80061 LIPID PANEL: CPT

## 2024-06-10 PROCEDURE — G0103 PSA SCREENING: HCPCS

## 2024-06-10 PROCEDURE — 84443 ASSAY THYROID STIM HORMONE: CPT

## 2024-06-10 PROCEDURE — 82550 ASSAY OF CK (CPK): CPT

## 2024-06-10 PROCEDURE — 82746 ASSAY OF FOLIC ACID SERUM: CPT

## 2024-06-11 ENCOUNTER — HOSPITAL ENCOUNTER (OUTPATIENT)
Dept: PET IMAGING | Facility: HOSPITAL | Age: 74
Discharge: HOME | End: 2024-06-11
Admitting: INTERNAL MEDICINE
Payer: MEDICARE

## 2024-06-11 DIAGNOSIS — L29.9 ITCHING: ICD-10-CM

## 2024-06-11 DIAGNOSIS — E78.00 PURE HYPERCHOLESTEROLEMIA: Primary | ICD-10-CM

## 2024-06-11 DIAGNOSIS — R61 NIGHT SWEATS: ICD-10-CM

## 2024-06-11 DIAGNOSIS — D69.1 PLATELET DISORDER: ICD-10-CM

## 2024-06-11 DIAGNOSIS — R53.83 FATIGUE, UNSPECIFIED TYPE: ICD-10-CM

## 2024-06-11 DIAGNOSIS — R79.89 ABNORMAL CBC: ICD-10-CM

## 2024-06-11 DIAGNOSIS — R73.9 ELEVATED BLOOD SUGAR: ICD-10-CM

## 2024-06-11 DIAGNOSIS — R74.8 ELEVATED CK: ICD-10-CM

## 2024-06-11 LAB — CREAT BLDA-MCNC: 0.9 MG/DL (ref 0.6–1.3)

## 2024-06-11 PROCEDURE — 71260 CT THORAX DX C+: CPT

## 2024-06-11 PROCEDURE — 74177 CT ABD & PELVIS W/CONTRAST: CPT

## 2024-06-11 PROCEDURE — 25510000001 IOPAMIDOL 61 % SOLUTION: Performed by: INTERNAL MEDICINE

## 2024-06-11 PROCEDURE — 82565 ASSAY OF CREATININE: CPT

## 2024-06-11 RX ADMIN — IOPAMIDOL 85 ML: 612 INJECTION, SOLUTION INTRAVENOUS at 08:18

## 2024-08-02 ENCOUNTER — LAB (OUTPATIENT)
Dept: LAB | Facility: HOSPITAL | Age: 74
End: 2024-08-02
Payer: MEDICARE

## 2024-08-02 PROCEDURE — 83036 HEMOGLOBIN GLYCOSYLATED A1C: CPT | Performed by: INTERNAL MEDICINE

## 2024-08-02 PROCEDURE — 80053 COMPREHEN METABOLIC PANEL: CPT | Performed by: INTERNAL MEDICINE

## 2024-08-02 PROCEDURE — 82550 ASSAY OF CK (CPK): CPT | Performed by: INTERNAL MEDICINE

## 2024-08-02 PROCEDURE — 80061 LIPID PANEL: CPT | Performed by: INTERNAL MEDICINE

## 2024-10-21 NOTE — PROGRESS NOTES
REASON FOR CONSULTATION: Elevated MPV  Provide an opinion on any further workup or treatment                             REQUESTING PHYSICIAN: Ofelia Bradford MD    RECORDS OBTAINED:  Records of the patients history including those obtained from the referring provider were reviewed and summarized in detail.    HISTORY OF PRESENT ILLNESS:  The patient is a 74 y.o. year old male who is here for an opinion about the above issue.    History of Present Illness   The patient is a 74-year-old male seen for a Medicare well visit 5/28/2024 and evaluated for his history of hypertension, tremors and deconditioned fatigue and night sweats.  In discussing this with him 10/22/2024 he has had the night sweats for 30 years and he is, in particular, noting pain in his legs upon walking-dull ache-that he did not have previously but this has noticeably worsened in the last year.  Please see review of systems otherwise but this is led to at least a discussion that mean platelet volume is associated with vascular disease, cardiac disease and diabetes.    His studies included a normal CBC with mild elevation of MPV, normal PSA, CK of 245, B12 of 500, folate greater than 20, sed rate less than 1, CRP less than 0.3.    Additional CT of the chest, abdomen and pelvis was negative for adenopathy, splenomegaly or intrathoracic or intra-abdominal abnormality.  Past Medical History:   Diagnosis Date    Allergic over 40 yrs    seasonal    Arthritis     Noonan's esophagus     Benign prostatic hyperplasia 2017    some possible symptoms    Cholelithiasis June 2009    Gallbladder removed    Colon polyp     Dizziness     AT TIMES WHEN STANDING    Dry eye     Erectile dysfunction     GERD (gastroesophageal reflux disease)     History of colonic polyps     HL (hearing loss) 2010    Hyperlipidemia     Hypertension     Low back pain 1995    off and on for many years OK now w/ pt exercise    Seasonal allergies     Tremor 1996    familial tremor     Umbilical hernia         Past Surgical History:   Procedure Laterality Date    CHOLECYSTECTOMY N/A 2010    Dr. Angel Gordon, [facility]    COLONOSCOPY N/A 6/20/2018    Procedure: COLONOSCOPY to ccecum with cold bx polypectomy;  Surgeon: Stuart Hernandez MD;  Location: [facility] ENDOSCOPY;  Service: Gastroenterology    COLONOSCOPY N/A 7/30/2021    Procedure: COLONOSCOPY  TO CECUM WITH COLD BIOPSIES AND HOT SNARE POLYPECTOMY AND SALINE LIFT INJECTION AND ORISE GEL;  Surgeon: Klaus Omalley MD;  Location: [facility] ENDOSCOPY;  Service: Gastroenterology;  Laterality: N/A;  PRE:FAMILY HISTORY OF COLON CANCER  POST: POLYPS, FAIR PREP    COLONOSCOPY N/A 12/14/2023    Procedure: COLONOSCOPY into cecum;  Surgeon: Stuart Hernandez MD;  Location: [facility] ENDOSCOPY;  Service: General;  Laterality: N/A;  Pre: History of Polyps, Family history of colon cancer  Post: Normal, Inadequate prep    ENDOSCOPY N/A 6/20/2018    Procedure: ESOPHAGOGASTRODUODENOSCOPY with bx;  Surgeon: Stuart Hernandez MD;  Location: [facility] ENDOSCOPY;  Service: Gastroenterology    ENDOSCOPY N/A 7/30/2021    Procedure: ESOPHAGOGASTRODUODENOSCOPY WITH COLD BIOPSIES;  Surgeon: Kluas Omalley MD;  Location: [facility] ENDOSCOPY;  Service: Gastroenterology;  Laterality: N/A;  PRE: DYSPEPSIAPOST:GASTRITIS    ENDOSCOPY  12/14/2023    Procedure: ESOPHAGOGASTRODUODENOSCOPY;  Surgeon: Stuart Hernandez MD;  Location: [facility] ENDOSCOPY;  Service: General;;  Pre: History of Noonan's  Post: Normal    ENDOSCOPY AND COLONOSCOPY N/A 2015    Dr. Angel Gordon, [facility]    INGUINAL HERNIA REPAIR Bilateral 2010, 2014    Dr. Angel Gordon, [facility]    NISSEN FUNDOPLICATION LAPAROSCOPIC N/A     UMBILICAL HERNIA REPAIR N/A 2010    Dr. Angel Gordon, [facility]    UMBILICAL HERNIA REPAIR N/A 11/4/2022    Procedure: UMBILICAL HERNIA REPAIR;  Surgeon: Angel Gordon MD;  Location: [facility] OR [facility];  Service: General;  Laterality: N/A;    UPPER GASTROINTESTINAL ENDOSCOPY  07/30/2021        Current  Outpatient Medications on File Prior to Visit   Medication Sig Dispense Refill    clonazePAM (KlonoPIN) 0.5 MG tablet Take 1 tablet by mouth Daily As Needed (HAND TREMORS). 90 tablet 0    desonide (DESOWEN) 0.05 % cream Apply 1 Application topically to the appropriate area as directed 2 (Two) Times a Day As Needed for Irritation.      Glucosamine-Chondroit-Vit C-Mn (GLUCOSAMINE CHONDR 1500 COMPLX PO) Take 1 tablet by mouth Daily.      hydrocortisone 1 % cream Apply 1 Application topically to the appropriate area as directed As Needed for Irritation.      hydrocortisone 2.5 % cream Apply 1 Application topically to the appropriate area as directed As Needed for Irritation.  3    losartan (COZAAR) 25 MG tablet TAKE 1 TABLET BY MOUTH DAILY 90 tablet 1    meloxicam (MOBIC) 15 MG tablet TAKE 1 TABLET BY MOUTH DAILY 90 tablet 1    methylcellulose, Laxative, (CITRUCEL) 500 MG tablet tablet Take 6 tablets by mouth Every Morning.      Mirabegron ER (Myrbetriq) 50 MG tablet sustained-release 24 hour 24 hr tablet Take 50 mg by mouth Daily. 90 tablet 1    Multiple Vitamins-Minerals (PRESERVISION AREDS 2 PO) Take 2 capsules by mouth Daily.      pantoprazole (PROTONIX) 40 MG EC tablet TAKE 1 TABLET BY MOUTH DAILY 90 tablet 1    polyethyl glycol-propyl glycol (Systane) 0.4-0.3 % solution ophthalmic solution (artificial tears) Administer 1 drop to both eyes Every 1 (One) Hour As Needed (DRYNESS).      triamcinolone (KENALOG) 0.1 % ointment Apply 1 Application topically to the appropriate area as directed 3 (Three) Times a Day As Needed for Irritation.      atorvastatin (LIPITOR) 10 MG tablet TAKE 1 TABLET BY MOUTH DAILY 90 tablet 1    hydrOXYzine (ATARAX) 25 MG tablet Take 1 tablet by mouth At Night As Needed for Itching. 90 tablet 2     No current facility-administered medications on file prior to visit.        ALLERGIES:  No Known Allergies     Social History     Socioeconomic History    Marital status:    Tobacco Use     "Smoking status: Never     Passive exposure: Never    Smokeless tobacco: Never   Vaping Use    Vaping status: Never Used   Substance and Sexual Activity    Alcohol use: Yes     Alcohol/week: 5.0 standard drinks of alcohol     Types: 5 Cans of beer per week     Comment: weekly    Drug use: No    Sexual activity: Yes     Partners: Female     Birth control/protection: None        Family History   Problem Relation Age of Onset    Parkinsonism Mother     Colon cancer Mother     Colon polyps Mother     Cancer Mother         colon    Other Mother         Parkinsons    Tremor Father     Coronary artery disease Father     Arthritis Father     Cancer Father         lung    Hearing loss Father     Heart disease Father     Malig Hyperthermia Neg Hx         Review of Systems   Constitutional:  Positive for diaphoresis (30 years) and fatigue.   HENT: Negative.     Eyes:  Positive for visual disturbance.        Dry eyes   Respiratory:  Positive for shortness of breath.    Cardiovascular: Negative.    Gastrointestinal:  Positive for abdominal distention and diarrhea.        Reflux   Genitourinary:  Positive for frequency.   Musculoskeletal:  Positive for arthralgias, back pain and gait problem (Leg weakness after walking, mild hip pain).   Skin:  Positive for rash (Derm f/u- contact?).   Neurological: Negative.  Positive for tremors.        Dysosmia?   Psychiatric/Behavioral:  Positive for decreased concentration. Negative for sleep disturbance.         Objective     Vitals:    10/22/24 1450   BP: 148/88   Pulse: 61   Temp: 97.6 °F (36.4 °C)   TempSrc: Oral   SpO2: 98%   Weight: 84.4 kg (186 lb)   Height: 175.3 cm (69.02\")   PainSc: 0-No pain         10/22/2024     2:51 PM   Current Status   ECOG score 0       Physical Exam  Constitutional:       Appearance: Normal appearance. He is normal weight.   HENT:      Head: Normocephalic and atraumatic.      Nose: Nose normal.      Mouth/Throat:      Mouth: Mucous membranes are moist.      " Pharynx: Oropharynx is clear.   Eyes:      Extraocular Movements: Extraocular movements intact.      Conjunctiva/sclera: Conjunctivae normal.      Pupils: Pupils are equal, round, and reactive to light.   Cardiovascular:      Rate and Rhythm: Normal rate and regular rhythm.      Pulses: Normal pulses.      Heart sounds: Normal heart sounds.      Comments: Peripheral pulses reduced-dorsalis pedis, posterior tibial bilaterally  Pulmonary:      Effort: Pulmonary effort is normal.      Breath sounds: Normal breath sounds.   Abdominal:      General: Bowel sounds are normal.      Palpations: Abdomen is soft.   Musculoskeletal:         General: Normal range of motion.      Cervical back: Normal range of motion and neck supple.   Skin:     General: Skin is warm and dry.   Neurological:      General: No focal deficit present.      Mental Status: He is oriented to person, place, and time.   Psychiatric:         Mood and Affect: Mood normal.         Behavior: Behavior normal.           RECENT LABS:  Hematology WBC   Date Value Ref Range Status   10/22/2024 5.89 3.40 - 10.80 10*3/mm3 Final   02/10/2022 4.6 3.4 - 10.8 x10E3/uL Final     RBC   Date Value Ref Range Status   10/22/2024 5.18 4.14 - 5.80 10*6/mm3 Final   02/10/2022 5.36 4.14 - 5.80 x10E6/uL Final     Hemoglobin   Date Value Ref Range Status   10/22/2024 15.5 13.0 - 17.7 g/dL Final     Hematocrit   Date Value Ref Range Status   10/22/2024 45.3 37.5 - 51.0 % Final     Platelets   Date Value Ref Range Status   10/22/2024 170 140 - 450 10*3/mm3 Final        Peripheral smear without enlarged platelets, leukocytes.  Normal per number and differential, RBCs without morphologic abnormality.  Assessment & Plan         74-year-old male seen for follow-up on Medicare visit in May 2028 at which time he was assessed for his history of hypertension, tremors, deconditioning with associated fatigue and night sweats long-term.  Particularly when assessments with him he has noted pain  in his legs upon walking which is worse in particular last years we are asked to see him for elevated mean platelet volume the significance of which is not certain with a normal platelet count and a normal peripheral blood smear.  It is associated, incidentally, with cardiovascular disease and peripheral vascular disease as well as diabetes and, considering his extremity discomfort upon activity and symptoms of claudication, prompts a vascular assessment.  Please note that the MPV value is present over the last 2 years as being mildly elevated and is not noted on previous CBC studies prior to that time.  A chronic immunologic, albeit very mild, thrombocytopenia also remains an option.    Plan:  *Return to laboratory for CMP, hemoglobin A1c, lipid panel, platelet function testing, IPF testing    *Ambulatory referral to vascular surgery for lower extremity claudication.*    *Follow-up assessment in the next 3 to 6 weeks.

## 2024-10-22 ENCOUNTER — LAB (OUTPATIENT)
Dept: OTHER | Facility: HOSPITAL | Age: 74
End: 2024-10-22
Payer: MEDICARE

## 2024-10-22 ENCOUNTER — CONSULT (OUTPATIENT)
Dept: ONCOLOGY | Facility: CLINIC | Age: 74
End: 2024-10-22
Payer: MEDICARE

## 2024-10-22 VITALS
DIASTOLIC BLOOD PRESSURE: 88 MMHG | HEIGHT: 69 IN | WEIGHT: 186 LBS | HEART RATE: 61 BPM | OXYGEN SATURATION: 98 % | SYSTOLIC BLOOD PRESSURE: 148 MMHG | BODY MASS INDEX: 27.55 KG/M2 | TEMPERATURE: 97.6 F

## 2024-10-22 DIAGNOSIS — I73.9 CLAUDICATION: ICD-10-CM

## 2024-10-22 DIAGNOSIS — R03.0 ELEVATED BLOOD-PRESSURE READING WITHOUT DIAGNOSIS OF HYPERTENSION: Primary | ICD-10-CM

## 2024-10-22 DIAGNOSIS — E78.5 HYPERLIPIDEMIA, UNSPECIFIED HYPERLIPIDEMIA TYPE: Primary | ICD-10-CM

## 2024-10-22 DIAGNOSIS — R79.89 ABNORMAL CBC: Primary | ICD-10-CM

## 2024-10-22 DIAGNOSIS — D69.1 LARGE PLATELETS: ICD-10-CM

## 2024-10-22 DIAGNOSIS — R79.89 OTHER SPECIFIED ABNORMAL FINDINGS OF BLOOD CHEMISTRY: ICD-10-CM

## 2024-10-22 DIAGNOSIS — R73.03 PREDIABETES: ICD-10-CM

## 2024-10-22 LAB
ALBUMIN SERPL-MCNC: 4.5 G/DL (ref 3.5–5.2)
ALBUMIN/GLOB SERPL: 1.9 G/DL
ALP SERPL-CCNC: 57 U/L (ref 39–117)
ALT SERPL W P-5'-P-CCNC: 26 U/L (ref 1–41)
ANION GAP SERPL CALCULATED.3IONS-SCNC: 9.6 MMOL/L (ref 5–15)
AST SERPL-CCNC: 20 U/L (ref 1–40)
BASOPHILS # BLD AUTO: 0.02 10*3/MM3 (ref 0–0.2)
BASOPHILS NFR BLD AUTO: 0.3 % (ref 0–1.5)
BILIRUB SERPL-MCNC: 0.4 MG/DL (ref 0–1.2)
BUN SERPL-MCNC: 15 MG/DL (ref 8–23)
BUN/CREAT SERPL: 17.4 (ref 7–25)
CALCIUM SPEC-SCNC: 10.1 MG/DL (ref 8.6–10.5)
CHLORIDE SERPL-SCNC: 105 MMOL/L (ref 98–107)
CHOLEST SERPL-MCNC: 201 MG/DL (ref 0–200)
CLOSURE TME COLL+ADP BLD: 106 SECONDS (ref 52–122)
CLOSURE TME COLL+EPINEP BLD: 149 SECONDS (ref 68–148)
CO2 SERPL-SCNC: 28.4 MMOL/L (ref 22–29)
CREAT SERPL-MCNC: 0.86 MG/DL (ref 0.76–1.27)
DEPRECATED RDW RBC AUTO: 42.7 FL (ref 37–54)
EGFRCR SERPLBLD CKD-EPI 2021: 91.4 ML/MIN/1.73
EOSINOPHIL # BLD AUTO: 0.11 10*3/MM3 (ref 0–0.4)
EOSINOPHIL NFR BLD AUTO: 1.9 % (ref 0.3–6.2)
ERYTHROCYTE [DISTWIDTH] IN BLOOD BY AUTOMATED COUNT: 13.2 % (ref 12.3–15.4)
GLOBULIN UR ELPH-MCNC: 2.4 GM/DL
GLUCOSE SERPL-MCNC: 90 MG/DL (ref 65–99)
HBA1C MFR BLD: 5.6 % (ref 4.8–5.6)
HCT VFR BLD AUTO: 45.3 % (ref 37.5–51)
HDLC SERPL-MCNC: 61 MG/DL (ref 40–60)
HGB BLD-MCNC: 15.5 G/DL (ref 13–17.7)
IMM GRANULOCYTES # BLD AUTO: 0.04 10*3/MM3 (ref 0–0.05)
IMM GRANULOCYTES NFR BLD AUTO: 0.7 % (ref 0–0.5)
LDLC SERPL CALC-MCNC: 119 MG/DL (ref 0–100)
LDLC/HDLC SERPL: 1.9 {RATIO}
LYMPHOCYTES # BLD AUTO: 0.9 10*3/MM3 (ref 0.7–3.1)
LYMPHOCYTES NFR BLD AUTO: 15.3 % (ref 19.6–45.3)
MCH RBC QN AUTO: 29.9 PG (ref 26.6–33)
MCHC RBC AUTO-ENTMCNC: 34.2 G/DL (ref 31.5–35.7)
MCV RBC AUTO: 87.5 FL (ref 79–97)
MONOCYTES # BLD AUTO: 0.5 10*3/MM3 (ref 0.1–0.9)
MONOCYTES NFR BLD AUTO: 8.5 % (ref 5–12)
NEUTROPHILS NFR BLD AUTO: 4.32 10*3/MM3 (ref 1.7–7)
NEUTROPHILS NFR BLD AUTO: 73.3 % (ref 42.7–76)
NRBC BLD AUTO-RTO: 0 /100 WBC (ref 0–0.2)
PLATELET # BLD AUTO: 170 10*3/MM3 (ref 140–450)
PLATELET # BLD AUTO: 183 10*3/MM3 (ref 140–450)
PLATELETS.RETICULATED NFR BLD AUTO: 15.4 % (ref 0.9–6.5)
PMV BLD AUTO: 12.6 FL (ref 6–12)
POTASSIUM SERPL-SCNC: 3.9 MMOL/L (ref 3.5–5.2)
PROT SERPL-MCNC: 6.9 G/DL (ref 6–8.5)
RBC # BLD AUTO: 5.18 10*6/MM3 (ref 4.14–5.8)
SODIUM SERPL-SCNC: 143 MMOL/L (ref 136–145)
TRIGL SERPL-MCNC: 119 MG/DL (ref 0–150)
VLDLC SERPL-MCNC: 21 MG/DL (ref 5–40)
WBC NRBC COR # BLD AUTO: 5.89 10*3/MM3 (ref 3.4–10.8)

## 2024-10-22 PROCEDURE — 80061 LIPID PANEL: CPT | Performed by: INTERNAL MEDICINE

## 2024-10-22 PROCEDURE — 83036 HEMOGLOBIN GLYCOSYLATED A1C: CPT | Performed by: INTERNAL MEDICINE

## 2024-10-22 PROCEDURE — 85055 RETICULATED PLATELET ASSAY: CPT | Performed by: INTERNAL MEDICINE

## 2024-10-22 PROCEDURE — 80053 COMPREHEN METABOLIC PANEL: CPT | Performed by: INTERNAL MEDICINE

## 2024-10-22 PROCEDURE — 99204 OFFICE O/P NEW MOD 45 MIN: CPT | Performed by: INTERNAL MEDICINE

## 2024-10-22 PROCEDURE — 36415 COLL VENOUS BLD VENIPUNCTURE: CPT | Performed by: INTERNAL MEDICINE

## 2024-10-22 PROCEDURE — 85576 BLOOD PLATELET AGGREGATION: CPT | Performed by: INTERNAL MEDICINE

## 2024-10-22 PROCEDURE — 1126F AMNT PAIN NOTED NONE PRSNT: CPT | Performed by: INTERNAL MEDICINE

## 2024-10-22 PROCEDURE — 85025 COMPLETE CBC W/AUTO DIFF WBC: CPT | Performed by: INTERNAL MEDICINE

## 2024-10-28 ENCOUNTER — PATIENT MESSAGE (OUTPATIENT)
Dept: FAMILY MEDICINE CLINIC | Facility: CLINIC | Age: 74
End: 2024-10-28

## 2024-10-29 ENCOUNTER — OFFICE VISIT (OUTPATIENT)
Age: 74
End: 2024-10-29
Payer: MEDICARE

## 2024-10-29 VITALS
DIASTOLIC BLOOD PRESSURE: 85 MMHG | BODY MASS INDEX: 27.55 KG/M2 | HEIGHT: 69 IN | SYSTOLIC BLOOD PRESSURE: 156 MMHG | HEART RATE: 68 BPM | WEIGHT: 186 LBS

## 2024-10-29 DIAGNOSIS — M54.10 RADICULAR PAIN OF LEFT LOWER EXTREMITY: Primary | ICD-10-CM

## 2024-10-29 DIAGNOSIS — M79.604 RIGHT LEG PAIN: Primary | ICD-10-CM

## 2024-10-29 DIAGNOSIS — M54.50 LOW BACK PAIN RADIATING TO LEFT LEG: ICD-10-CM

## 2024-10-29 DIAGNOSIS — M79.605 LEFT LEG PAIN: ICD-10-CM

## 2024-10-29 DIAGNOSIS — M79.605 LOW BACK PAIN RADIATING TO LEFT LEG: ICD-10-CM

## 2024-10-29 PROCEDURE — 99203 OFFICE O/P NEW LOW 30 MIN: CPT | Performed by: SURGERY

## 2024-10-29 PROCEDURE — 1159F MED LIST DOCD IN RCRD: CPT | Performed by: SURGERY

## 2024-10-29 PROCEDURE — 1160F RVW MEDS BY RX/DR IN RCRD: CPT | Performed by: SURGERY

## 2024-10-29 NOTE — PROGRESS NOTES
"Chief Complaint  Leg Pain ( )    Subjective        Paul Sterling presents to [facility] VASCULAR SURGERY  History of Present Illness    New patient evaluation for lower extremity weakness.  On detailed direct questioning patient does not express symptoms of vasculogenic claudication.  His symptoms to me seem more consistent with neurogenic claudication as he has a known lumbar back issue and seen on previous MRIs.  Denies ischemic rest pain or tissue loss.    Objective   Vital Signs:  /85   Pulse 68   Ht 175.3 cm (69.02\")   Wt 84.4 kg (186 lb)   BMI 27.45 kg/m²   Estimated body mass index is 27.45 kg/m² as calculated from the following:    Height as of this encounter: 175.3 cm (69.02\").    Weight as of this encounter: 84.4 kg (186 lb).         Paul Sterling  reports that he has never smoked. He has never been exposed to tobacco smoke. He has never used smokeless tobacco. I     Physical Exam  Constitutional:       Appearance: He is well-developed.   Pulmonary:      Effort: Pulmonary effort is normal. No respiratory distress.   Abdominal:      General: There is no distension.      Palpations: Abdomen is soft.   Neurological:      Mental Status: He is alert and oriented to person, place, and time.     2+ palpable bilateral dorsalis pedis and posterior tibial pulses.    Result Review :    The following data was reviewed by: Fredy Clay MD on 10/29/24  CBC          6/10/2024    07:50 10/22/2024    14:42 10/22/2024    15:58   CBC   WBC 4.95  5.89     RBC 5.21  5.18     Hemoglobin 15.4  15.5     Hematocrit 47.4  45.3     MCV 91.0  87.5     MCH 29.6  29.9     MCHC 32.5  34.2     RDW 13.2  13.2     Platelets 155  170  183       BMP          6/11/2024    08:18 8/2/2024    10:30 10/22/2024    15:58   BMP   BUN  16  15    Creatinine 0.90  0.86  0.86    Sodium  136  143    Potassium  4.4  3.9    Chloride  103  105    CO2  25.4  28.4    Calcium  9.2  10.1       A1C Last 3 Results  "         8/2/2024    10:30 10/22/2024    15:58   HGBA1C Last 3 Results   Hemoglobin A1C 5.70  5.60        Data reviewed : Radiologic studies as below  Vascular Surgical History:    Vascular Imaging History:  MRI of the lumbar spine 2021:  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 See  above. This is most prominent at L2-L3 where there is loss of disc  height, posterior element degenerative disease and a broad-based disc  osteophyte complex which is more prominent to the left. There is mild  lateral recess narrowing to the left. The disc osteophyte complex  approaches but does not definitively involve the left L2 nerve as it  exits the neural foramen.    (Text in bold font has been individually reviewed by myself and confirmed)         Assessment and Plan     Vascular surgery following for:  Lower extremity pain    Diagnoses and all orders for this visit:    1. Right leg pain (Primary)    2. Left leg pain    Patient with bilateral lower extremity weakness and not consistent with vasculogenic claudication.  Palpable pedal pulses.  Offered him a lower extremity arterial Doppler to confirm but do not find it necessary.  Patient also agrees not necessary.  He is going to work with his primary care doctor Dr. Bradford about further workup of his back issues.  This was last imaged on MRI done in 2021.     Follow Up     No follow-ups on file.  Patient was given instructions and counseling regarding his condition or for health maintenance advice. Please see specific information pulled into the AVS if appropriate.

## 2024-11-05 ENCOUNTER — TREATMENT (OUTPATIENT)
Dept: PHYSICAL THERAPY | Facility: CLINIC | Age: 74
End: 2024-11-05
Payer: MEDICARE

## 2024-11-05 DIAGNOSIS — M54.42 BILATERAL LOW BACK PAIN WITH LEFT-SIDED SCIATICA, UNSPECIFIED CHRONICITY: Primary | ICD-10-CM

## 2024-11-05 NOTE — PROGRESS NOTES
Physical Therapy Initial Evaluation and Plan of Care  [address], [address]  [place] [address]    Patient: Paul Sterling   : 1950  Diagnosis/ICD-10 Code:  Bilateral low back pain with left-sided sciatica, unspecified chronicity [M54.42]  Referring practitioner: AMY Hawthorne  Date of Initial Visit: 2024  Today's Date: 2024  Patient seen for 1 session         Visit Diagnoses:    ICD-10-CM ICD-9-CM   1. Bilateral low back pain with left-sided sciatica, unspecified chronicity  M54.42 724.3         Subjective Questionnaire: Oswestry: 28      Subjective Evaluation    History of Present Illness  Mechanism of injury: Patient is a 74 year old male who presents with lumbar pain that has been bothering him for about 2 weeks.  Denies an injury at this time.  Reports having back pain off and on for about 30 years.  Denies any surgeries on the back.  Reports having PT on  2 occasions, with the most recent being about 10 years ago.    Reports pain with standing, sitting, laying down.  Reports that it feels better when he moves around.  Reports being able to sleep 2-3 hours before the pain wakes him up.      Patient Occupation: Retired Pain  Current pain rating: 3  At worst pain rating: 10  Location: left lumbar spine to left glut  Quality: sharp  Alleviating factors: moving around.  Aggravating factors: standing and ambulation (sitting)  Progression: improved             Objective          Postural Observations    Additional Postural Observation Details  Normal sit to stand.  Patient ambulates with decreased arm swing during gait.    Palpation     Additional Palpation Details  TTP to left PSIS and left glut region.    Active Range of Motion     Lumbar   Flexion: 102 degrees with pain  Extension: 33 degrees with pain  Left lateral flexion: 31 degrees   Right lateral flexion: 27 degrees     Strength/Myotome Testing     Left Hip   Planes of Motion   Flexion: 4+  Abduction:  4  Adduction: 4    Right Hip   Planes of Motion   Flexion: 4+  Abduction: 4  Adduction: 4    Left Knee   Extension: 4+    Right Knee   Extension: 4+    Left Ankle/Foot   Dorsiflexion: 5    Right Ankle/Foot   Dorsiflexion: 5    Tests     Additional Tests Details  + SLR on left for LBP  + JAIME on left for LBP          Assessment & Plan       Assessment  Impairments: abnormal gait, abnormal or restricted ROM, activity intolerance, lacks appropriate home exercise program and pain with function   Assessment details: Patient is a 74 year old who presents with c/o pain, TTP, limited lumbar AROM, positive special testing and an antalgic gait pattern which is limiting his ability to perform activities.  Barriers to therapy: none  Prognosis: good  Prognosis details: STG's to be met by 2 weeks  1)  Independent with HEP to show compliance  2)  Decrease pain by 50% or more to allow patient to perform self care and activities more comfortably  3)  AROM lumbar SB to 35 for improved trunk mobility during gait  4)  Min to no TTP present to indicate improved healing response  5)  Patient to sleep up to 5 hours without lumbar pain waking him up    LTG's to be met by 4 weeks  1)  Independent with HEP progression to show continued compliance  2)  Decrease pain by 75% or more to allow patient to return to activities and hobbies with minimal limitations   3)  Negative special testing  4)  No TTP present to indicate improved healing response  5)  Patient to return to walking 2 miles a day for exercise without reports of pain        Plan  Therapy options: will be seen for skilled therapy services  Planned therapy interventions: strengthening, stretching, therapeutic activities, home exercise program and neuromuscular re-education  Frequency: 2x week  Duration in weeks: 4  Treatment plan discussed with: patient            Timed:         Manual Therapy:    0     mins  34422;     Therapeutic Exercise:    13     mins  20214;     Neuromuscular  João:    8    mins  62308;    Therapeutic Activity:     8     mins  29846;     Gait Trainin     mins  19745;     Ultrasound:     0     mins  42081;          Un-Timed:  Electrical Stimulation:    0     mins  62808 ( );    Low Eval     16     Mins  91102  Mod Eval     0     Mins  20056  High Eval                       0     Mins  62075        Timed Treatment:   29   mins   Total Treatment:     45   mins          PT: Norman Morales PT     [place] License [id number]  Electronically signed by Norman Morales PT, 24, 8:09 AM EST    Certification Period: 2024 thru 2025  I certify that the therapy services are furnished while this patient is under my care.  The services outlined above are required by this patient, and will be reviewed every 90 days.    Jesus Alberto Robbins Pa  [address] [place] [address]   NPI: [id number]      Norman Morales PT   License number: [id number]        Physician Signature:__________________________________________________    PHYSICIAN: Jesus Alberto Robbins PA      DATE:     Please sign and return via fax to [email or web address] . Thank you, [facility].

## 2024-11-07 ENCOUNTER — TREATMENT (OUTPATIENT)
Dept: PHYSICAL THERAPY | Facility: CLINIC | Age: 74
End: 2024-11-07
Payer: MEDICARE

## 2024-11-07 DIAGNOSIS — M54.42 BILATERAL LOW BACK PAIN WITH LEFT-SIDED SCIATICA, UNSPECIFIED CHRONICITY: Primary | ICD-10-CM

## 2024-11-07 NOTE — PROGRESS NOTES
Physical Therapy Daily Treatment Note  [address], [address]  [place] [address]      Patient: Paul Sterling   : 1950  Referring practitioner: AMY Hawthorne  Date of Initial Visit: Type: THERAPY  Noted: 2024  Today's Date: 2024  Patient seen for 2 sessions       Visit Diagnoses:    ICD-10-CM ICD-9-CM   1. Bilateral low back pain with left-sided sciatica, unspecified chronicity  M54.42 724.3           Subjective   Patient reports pain in the back rated at 3/10 due to sitting a couple minutes in the waiting room, had no pain walking into the building.    Objective   See Exercise, Manual, and Modality Logs for complete treatment.       Assessment/Plan  Discussed sleeping positions with the patient and instructed him to use a pillow b/t the knees while on his side at night.  Instructed him to use a heating pad first thing in the morning, then do the exercises to help loosen him up.  Added NuStep for strength and endurance.  Added PPT and glut sets for core strengthening which will improve lumbar stability with activities.  Added spinal rotations to improve lumbar mobility which will allow patient to rotate during the without issues.  Continued with the KT to help the symptoms.  Patient tolerated the increase in the routine without visual or verbal signs of pain.    Timed:         Manual Therapy:    0     mins  98953;     Therapeutic Exercise:    24     mins  01435;     Neuromuscular João:    8    mins  46830;    Therapeutic Activity:     8     mins  22689;     Gait Training      0    mins  63882;  Work Conditioning     0   mins  31617       Untimed:  Electrical Stimulation:    0     mins  06871 ( );      Timed Treatment:   40   mins   Total Treatment:     40   mins    Norman Morales, PT  [place] License: [id number]

## 2024-11-11 ENCOUNTER — HOSPITAL ENCOUNTER (OUTPATIENT)
Dept: MRI IMAGING | Facility: HOSPITAL | Age: 74
Discharge: HOME | End: 2024-11-11
Admitting: INTERNAL MEDICINE
Payer: MEDICARE

## 2024-11-11 ENCOUNTER — TREATMENT (OUTPATIENT)
Dept: PHYSICAL THERAPY | Facility: CLINIC | Age: 74
End: 2024-11-11
Payer: MEDICARE

## 2024-11-11 DIAGNOSIS — M79.605 LOW BACK PAIN RADIATING TO LEFT LEG: ICD-10-CM

## 2024-11-11 DIAGNOSIS — M54.10 RADICULAR PAIN OF LEFT LOWER EXTREMITY: ICD-10-CM

## 2024-11-11 DIAGNOSIS — M54.50 LOW BACK PAIN RADIATING TO LEFT LEG: ICD-10-CM

## 2024-11-11 DIAGNOSIS — M54.42 BILATERAL LOW BACK PAIN WITH LEFT-SIDED SCIATICA, UNSPECIFIED CHRONICITY: Primary | ICD-10-CM

## 2024-11-11 PROCEDURE — 97110 THERAPEUTIC EXERCISES: CPT | Performed by: PHYSICAL THERAPIST

## 2024-11-11 PROCEDURE — 72148 MRI LUMBAR SPINE W/O DYE: CPT

## 2024-11-11 PROCEDURE — 97112 NEUROMUSCULAR REEDUCATION: CPT | Performed by: PHYSICAL THERAPIST

## 2024-11-11 PROCEDURE — 97530 THERAPEUTIC ACTIVITIES: CPT | Performed by: PHYSICAL THERAPIST

## 2024-11-11 NOTE — PROGRESS NOTES
Physical Therapy Daily Treatment Note  [address], [address]  [place] [address]      Patient: Paul Sterling   : 1950  Referring practitioner: AMY Hawthorne  Date of Initial Visit: Type: THERAPY  Noted: 2024  Today's Date: 2024  Patient seen for 3 sessions       Visit Diagnoses:    ICD-10-CM ICD-9-CM   1. Bilateral low back pain with left-sided sciatica, unspecified chronicity  M54.42 724.3           Subjective   Patient reports that he is slowly improving, reports pain in the left glut rated at 2/10.    Objective   See Exercise, Manual, and Modality Logs for complete treatment.       Assessment/Plan  Subjective reports are gradually improving (pain 3/10 at the previous visit).  Continued with the KT to help the symptoms.  Added bridges and prone opp arm/leg for core strengthening, which will improve lumbar stability with functional tasks.  Patient performed the routine without reports of pain in the lumbar spine region.      Timed:         Manual Therapy:    0     mins  26461;     Therapeutic Exercise:    22     mins  50873;     Neuromuscular João:    8    mins  71500;    Therapeutic Activity:     8     mins  15831;     Gait Training      0    mins  60620;  Work Conditioning     0   mins  82636       Untimed:  Electrical Stimulation:    0     mins  99815 ( );      Timed Treatment:   38   mins   Total Treatment:     38   mins    Norman Morales, PT  [place] License: [id number]

## 2024-11-21 ENCOUNTER — TREATMENT (OUTPATIENT)
Dept: PHYSICAL THERAPY | Facility: CLINIC | Age: 74
End: 2024-11-21
Payer: MEDICARE

## 2024-11-21 ENCOUNTER — DOCUMENTATION (OUTPATIENT)
Dept: PHYSICAL THERAPY | Facility: CLINIC | Age: 74
End: 2024-11-21

## 2024-11-21 DIAGNOSIS — M54.42 BILATERAL LOW BACK PAIN WITH LEFT-SIDED SCIATICA, UNSPECIFIED CHRONICITY: Primary | ICD-10-CM

## 2024-11-21 DIAGNOSIS — R03.0 ELEVATED BLOOD-PRESSURE READING WITHOUT DIAGNOSIS OF HYPERTENSION: ICD-10-CM

## 2024-11-21 DIAGNOSIS — K21.9 GASTROESOPHAGEAL REFLUX DISEASE, UNSPECIFIED WHETHER ESOPHAGITIS PRESENT: ICD-10-CM

## 2024-11-21 RX ORDER — MELOXICAM 15 MG/1
15 TABLET ORAL DAILY
Qty: 90 TABLET | Refills: 1 | Status: SHIPPED | OUTPATIENT
Start: 2024-11-21

## 2024-11-21 RX ORDER — PANTOPRAZOLE SODIUM 40 MG/1
40 TABLET, DELAYED RELEASE ORAL DAILY
Qty: 90 TABLET | Refills: 1 | Status: SHIPPED | OUTPATIENT
Start: 2024-11-21

## 2024-11-21 RX ORDER — LOSARTAN POTASSIUM 25 MG/1
25 TABLET ORAL DAILY
Qty: 90 TABLET | Refills: 1 | Status: SHIPPED | OUTPATIENT
Start: 2024-11-21

## 2024-11-21 NOTE — TELEPHONE ENCOUNTER
Rx Refill Note  Requested Prescriptions     Pending Prescriptions Disp Refills    meloxicam (MOBIC) 15 MG tablet [Pharmacy Med Name: MELOXICAM 15MG TABLETS] 90 tablet 1     Sig: TAKE 1 TABLET BY MOUTH DAILY    losartan (COZAAR) 25 MG tablet [Pharmacy Med Name: LOSARTAN 25MG TABLETS] 90 tablet 1     Sig: TAKE 1 TABLET BY MOUTH DAILY    pantoprazole (PROTONIX) 40 MG EC tablet [Pharmacy Med Name: PANTOPRAZOLE 40MG TABLETS] 90 tablet 1     Sig: TAKE 1 TABLET BY MOUTH DAILY      Last office visit with prescribing clinician: Visit date not found   Last telemedicine visit with prescribing clinician: 5/28/2024   Next office visit with prescribing clinician: Visit date not found                         Would you like a call back once the refill request has been completed: [] Yes [] No    If the office needs to give you a call back, can they leave a voicemail: [] Yes [] No    Wagner García MA  11/21/24, 09:33 EST

## 2024-11-21 NOTE — PROGRESS NOTES
Physical Therapy Daily Treatment Note  [address] [facility], [address]  [place] [address]      Patient: Paul Sterling   : 1950  Referring practitioner: AMY Hawthorne  Date of Initial Visit: Type: THERAPY  Noted: 2024  Today's Date: 2024  Patient seen for 4 sessions       Visit Diagnoses:    ICD-10-CM ICD-9-CM   1. Bilateral low back pain with left-sided sciatica, unspecified chronicity  M54.42 724.3           Subjective   Patient reports a little pain in the low back rated at 2/10.  Reports weakness and fatigue in the legs near the end of the day.    Objective   See Exercise, Manual, and Modality Logs for complete treatment.   Added PPT with steps, single leg RDL and suitcase carrys.    Assessment/Plan  Subjective reports of pain remain low.  Added PPT with steps, single leg RDL and suitcase carrys for core strengthening which will improve lumbar stability with lifting tasks.  Flexibility has improved since beginning PT as evident by his ability to push more with the stretches.  Patient had no reports of pain with the strengthening progressions.  Plan to continue to increase the lumbar stabilization exercises as the patient can tolerate.      Timed:         Manual Therapy:    0     mins  99112;     Therapeutic Exercise:    23     mins  42095;     Neuromuscular João:    8    mins  80658;    Therapeutic Activity:     8     mins  82603;     Gait Training      0    mins  74582;  Work Conditioning     0   mins  78159       Untimed:  Electrical Stimulation:    0     mins  02464 ( );      Timed Treatment:   39   mins   Total Treatment:     39   mins    Norman Morales, PT  [place] License: [id number]

## 2024-12-02 NOTE — PROGRESS NOTES
REASON FOR CONSULTATION: Elevated MPV  Provide an opinion on any further workup or treatment                             REQUESTING PHYSICIAN: Ofelia Bradford MD    RECORDS OBTAINED:  Records of the patients history including those obtained from the referring provider were reviewed and summarized in detail.    HISTORY OF PRESENT ILLNESS:  The patient is a 74 y.o. year old male who is here for an opinion about the above issue.    History of Present Illness   The patient is a 74-year-old male seen for a Medicare well visit 5/28/2024 and evaluated for his history of hypertension, tremors and deconditioned fatigue and night sweats.  In discussing this with him 10/22/2024 he has had the night sweats for 30 years and he is, in particular, noting pain in his legs upon walking-dull ache-that he did not have previously but this has noticeably worsened in the last year.  Please see review of systems otherwise but this is led to at least a discussion that mean platelet volume is associated with vascular disease, cardiac disease and diabetes.    His studies included a normal CBC with mild elevation of MPV, normal PSA, CK of 245, B12 of 500, folate greater than 20, sed rate less than 1, CRP less than 0.3.    Additional CT of the chest, abdomen and pelvis was negative for adenopathy, splenomegaly or intrathoracic or intra-abdominal abnormality.    Patient underwent testing revealing elevated IPF of 15.4, hemoglobin A1c of 5.6, normal CMP, lipid profile with total cholesterol 201, HDL 61, , essentially normal platelet function testing.  Additional examinations 10/29 included vascular surgery assessment that was felt not consistent with vasculogenic claudication.  As to the possibility of neurogenic claudication is referred for back assessment-low back pain 10/31/2024 and felt to have lumbar spondylosis with radiculopathy referred for physical therapy.    He is next seen 12/3/2024.  Wonderfully he describes that  physical therapy is improving his back pain as well as his leg pain.  He is to be seen by neurosurgery today but does not expect surgical intervention at this point.  As concerns his hematologic findings his peripheral smear, indices and IPF level would be consistent with a very mild chronic ITP for which he is extremely well compensated, and has likely been so, for many years.  No intervention is necessary at this point but yearly follow-up is felt reasonable as well as attention to any new medications that might affect this or significant viral illness that might suppress his marrow as well.        Past Medical History:   Diagnosis Date    Allergic over 40 yrs    seasonal    Arthritis     Noonan's esophagus     Benign prostatic hyperplasia 2017    some possible symptoms    Cholelithiasis June 2009    Gallbladder removed    Colon polyp     Dizziness     AT TIMES WHEN STANDING    Dry eye     Erectile dysfunction     GERD (gastroesophageal reflux disease)     History of colonic polyps     HL (hearing loss) 2010    Hyperlipidemia     Hypertension     Low back pain 1995    off and on for many years OK now w/ pt exercise    Seasonal allergies     Tremor 1996    familial tremor    Umbilical hernia         Past Surgical History:   Procedure Laterality Date    CHOLECYSTECTOMY N/A 2010    Dr. Angel Gordon, [facility]    COLONOSCOPY N/A 6/20/2018    Procedure: COLONOSCOPY to ccecum with cold bx polypectomy;  Surgeon: Stuart Hernandez MD;  Location: [facility] ENDOSCOPY;  Service: Gastroenterology    COLONOSCOPY N/A 7/30/2021    Procedure: COLONOSCOPY  TO CECUM WITH COLD BIOPSIES AND HOT SNARE POLYPECTOMY AND SALINE LIFT INJECTION AND ORISE GEL;  Surgeon: Klaus Omalley MD;  Location: [facility] ENDOSCOPY;  Service: Gastroenterology;  Laterality: N/A;  PRE:FAMILY HISTORY OF COLON CANCER  POST: POLYPS, FAIR PREP    COLONOSCOPY N/A 12/14/2023    Procedure: COLONOSCOPY into cecum;  Surgeon: Stuart Hernandez MD;  Location: [facility]  ENDOSCOPY;  Service: General;  Laterality: N/A;  Pre: History of Polyps, Family history of colon cancer  Post: Normal, Inadequate prep    ENDOSCOPY N/A 6/20/2018    Procedure: ESOPHAGOGASTRODUODENOSCOPY with bx;  Surgeon: Stuart Hernandez MD;  Location: [facility] ENDOSCOPY;  Service: Gastroenterology    ENDOSCOPY N/A 7/30/2021    Procedure: ESOPHAGOGASTRODUODENOSCOPY WITH COLD BIOPSIES;  Surgeon: Klaus Omalley MD;  Location: [facility] ENDOSCOPY;  Service: Gastroenterology;  Laterality: N/A;  PRE: DYSPEPSIAPOST:GASTRITIS    ENDOSCOPY  12/14/2023    Procedure: ESOPHAGOGASTRODUODENOSCOPY;  Surgeon: Stuart Hernandez MD;  Location: [facility] ENDOSCOPY;  Service: General;;  Pre: History of Noonan's  Post: Normal    ENDOSCOPY AND COLONOSCOPY N/A 2015    Dr. Angel Gordon, [facility]    INGUINAL HERNIA REPAIR Bilateral 2010, 2014    Dr. Angel Gordon, [facility]    NISSEN FUNDOPLICATION LAPAROSCOPIC N/A     UMBILICAL HERNIA REPAIR N/A 2010    Dr. Angel Gordon, [facility]    UMBILICAL HERNIA REPAIR N/A 11/4/2022    Procedure: UMBILICAL HERNIA REPAIR;  Surgeon: Angel Gordon MD;  [facility]: [facility];  Service: General;  Laterality: N/A;    UPPER GASTROINTESTINAL ENDOSCOPY  07/30/2021        Current Outpatient Medications on File Prior to Visit   Medication Sig Dispense Refill    atorvastatin (LIPITOR) 10 MG tablet TAKE 1 TABLET BY MOUTH DAILY 90 tablet 1    clonazePAM (KlonoPIN) 0.5 MG tablet Take 1 tablet by mouth Daily As Needed (HAND TREMORS). 90 tablet 0    desonide (DESOWEN) 0.05 % cream Apply 1 Application topically to the appropriate area as directed 2 (Two) Times a Day As Needed for Irritation.      Glucosamine-Chondroit-Vit C-Mn (GLUCOSAMINE CHONDR 1500 COMPLX PO) Take 1 tablet by mouth Daily.      hydrocortisone 1 % cream Apply 1 Application topically to the appropriate area as directed As Needed for Irritation.      hydrocortisone 2.5 % cream Apply 1 Application topically to the appropriate area as directed As Needed for  Irritation.  3    hydrOXYzine (ATARAX) 25 MG tablet Take 1 tablet by mouth At Night As Needed for Itching. 90 tablet 2    losartan (COZAAR) 25 MG tablet TAKE 1 TABLET BY MOUTH DAILY 90 tablet 1    meloxicam (MOBIC) 15 MG tablet TAKE 1 TABLET BY MOUTH DAILY 90 tablet 1    methylcellulose, Laxative, (CITRUCEL) 500 MG tablet tablet Take 6 tablets by mouth Every Morning.      Mirabegron ER (Myrbetriq) 50 MG tablet sustained-release 24 hour 24 hr tablet Take 50 mg by mouth Daily. 90 tablet 1    Multiple Vitamins-Minerals (PRESERVISION AREDS 2 PO) Take 2 capsules by mouth Daily.      pantoprazole (PROTONIX) 40 MG EC tablet TAKE 1 TABLET BY MOUTH DAILY 90 tablet 1    polyethyl glycol-propyl glycol (Systane) 0.4-0.3 % solution ophthalmic solution (artificial tears) Administer 1 drop to both eyes Every 1 (One) Hour As Needed (DRYNESS).      triamcinolone (KENALOG) 0.1 % ointment Apply 1 Application topically to the appropriate area as directed 3 (Three) Times a Day As Needed for Irritation.       No current facility-administered medications on file prior to visit.        ALLERGIES:  No Known Allergies     Social History     Socioeconomic History    Marital status:    Tobacco Use    Smoking status: Never     Passive exposure: Never    Smokeless tobacco: Never   Vaping Use    Vaping status: Never Used   Substance and Sexual Activity    Alcohol use: Yes     Alcohol/week: 5.0 standard drinks of alcohol     Types: 5 Cans of beer per week     Comment: weekly    Drug use: No    Sexual activity: Yes     Partners: Female     Birth control/protection: None        Family History   Problem Relation Age of Onset    Parkinsonism Mother     Colon cancer Mother     Colon polyps Mother     Cancer Mother         colon    Other Mother         Parkinsons    Tremor Father     Coronary artery disease Father     Arthritis Father     Cancer Father         lung    Hearing loss Father     Heart disease Father     Malig Hyperthermia Neg Hx      "    Review of Systems   Constitutional:  Positive for diaphoresis (30 years) and fatigue.   HENT: Negative.     Eyes:  Positive for visual disturbance.        Dry eyes   Respiratory:  Positive for shortness of breath.    Cardiovascular: Negative.    Gastrointestinal:  Positive for abdominal distention and diarrhea.        Reflux   Genitourinary:  Positive for frequency.   Musculoskeletal:  Positive for arthralgias, back pain and gait problem (Leg weakness after walking, mild hip pain).   Skin:  Positive for rash (Derm f/u- contact?).   Neurological: Negative.  Positive for tremors.        Dysosmia?   Psychiatric/Behavioral:  Positive for decreased concentration. Negative for sleep disturbance.         Objective     Vitals:    12/03/24 0912   BP: 156/91   Pulse: 75   Resp: 16   Temp: 97.5 °F (36.4 °C)   TempSrc: Oral   SpO2: 96%   Weight: 86.3 kg (190 lb 4.8 oz)   Height: 175.3 cm (69.02\")   PainSc: 0-No pain           12/3/2024     9:12 AM   Current Status   ECOG score 0       Physical Exam  Constitutional:       Appearance: Normal appearance. He is normal weight.   HENT:      Head: Normocephalic and atraumatic.      Nose: Nose normal.      Mouth/Throat:      Mouth: Mucous membranes are moist.      Pharynx: Oropharynx is clear.   Eyes:      Extraocular Movements: Extraocular movements intact.      Conjunctiva/sclera: Conjunctivae normal.      Pupils: Pupils are equal, round, and reactive to light.   Cardiovascular:      Rate and Rhythm: Normal rate and regular rhythm.      Pulses: Normal pulses.      Heart sounds: Normal heart sounds.      Comments: Peripheral pulses reduced-dorsalis pedis, posterior tibial bilaterally  Pulmonary:      Effort: Pulmonary effort is normal.      Breath sounds: Normal breath sounds.   Abdominal:      General: Bowel sounds are normal.      Palpations: Abdomen is soft.   Musculoskeletal:         General: Normal range of motion.      Cervical back: Normal range of motion and neck supple. "   Skin:     General: Skin is warm and dry.   Neurological:      General: No focal deficit present.      Mental Status: He is oriented to person, place, and time.   Psychiatric:         Mood and Affect: Mood normal.         Behavior: Behavior normal.           RECENT LABS:  Hematology WBC   Date Value Ref Range Status   12/03/2024 5.66 3.40 - 10.80 10*3/mm3 Final   02/10/2022 4.6 3.4 - 10.8 x10E3/uL Final     RBC   Date Value Ref Range Status   12/03/2024 5.28 4.14 - 5.80 10*6/mm3 Final   02/10/2022 5.36 4.14 - 5.80 x10E6/uL Final     Hemoglobin   Date Value Ref Range Status   12/03/2024 16.0 13.0 - 17.7 g/dL Final     Hematocrit   Date Value Ref Range Status   12/03/2024 47.0 37.5 - 51.0 % Final     Platelets   Date Value Ref Range Status   12/03/2024 179 140 - 450 10*3/mm3 Final        Peripheral smear without enlarged platelets, leukocytes.  Normal per number and differential, RBCs without morphologic abnormality.  Assessment & Plan         74-year-old male seen for follow-up on Medicare visit in May 2028 at which time he was assessed for his history of hypertension, tremors, deconditioning with associated fatigue and night sweats long-term.  Particularly when assessments with him he has noted pain in his legs upon walking which is worse in particular last years we are asked to see him for elevated mean platelet volume the significance of which is not certain with a normal platelet count and a normal peripheral blood smear.  It is associated, incidentally, with cardiovascular disease and peripheral vascular disease as well as diabetes and, considering his extremity discomfort upon activity and symptoms of claudication, prompts a vascular assessment.  Please note that the MPV value is present over the last 2 years as being mildly elevated and is not noted on previous CBC studies prior to that time.  A chronic immunologic, albeit very mild, thrombocytopenia also remains an option.      Patient underwent testing  revealing elevated IPF of 15.4, hemoglobin A1c of 5.6, normal CMP, lipid profile with total cholesterol 201, HDL 61, , essentially normal platelet function testing.  Additional examinations 10/29 included vascular surgery assessment that was felt not consistent with vasculogenic claudication.  As to the possibility of neurogenic claudication is referred for back assessment-low back pain 10/31/2024 and felt to have lumbar spondylosis with radiculopathy referred for physical therapy.       He is next seen 12/3/2024.  Wonderfully he describes that physical therapy is improving his back pain as well as his leg pain.  He is to be seen by neurosurgery today but does not expect surgical intervention at this point.  As concerns his hematologic findings his peripheral smear, indices and IPF level would be consistent with a very mild chronic ITP for which he is extremely well compensated, and has likely been so, for many years.  No intervention is necessary at this point but yearly follow-up is felt reasonable as well as attention to any new medications that might affect this or significant viral illness that might suppress his marrow as well.      Plan:  * plan yearly klidux-th-CB, CBC, IPF  *Patient agrees with this plan and follow-up.

## 2024-12-03 ENCOUNTER — LAB (OUTPATIENT)
Dept: OTHER | Facility: HOSPITAL | Age: 74
End: 2024-12-03
Payer: MEDICARE

## 2024-12-03 ENCOUNTER — OFFICE VISIT (OUTPATIENT)
Dept: ONCOLOGY | Facility: CLINIC | Age: 74
End: 2024-12-03
Payer: MEDICARE

## 2024-12-03 ENCOUNTER — TREATMENT (OUTPATIENT)
Dept: PHYSICAL THERAPY | Facility: CLINIC | Age: 74
End: 2024-12-03
Payer: MEDICARE

## 2024-12-03 ENCOUNTER — OFFICE VISIT (OUTPATIENT)
Dept: NEUROSURGERY | Facility: CLINIC | Age: 74
End: 2024-12-03
Payer: MEDICARE

## 2024-12-03 VITALS
DIASTOLIC BLOOD PRESSURE: 91 MMHG | RESPIRATION RATE: 16 BRPM | HEART RATE: 75 BPM | OXYGEN SATURATION: 96 % | WEIGHT: 190.3 LBS | SYSTOLIC BLOOD PRESSURE: 156 MMHG | BODY MASS INDEX: 28.19 KG/M2 | HEIGHT: 69 IN | TEMPERATURE: 97.5 F

## 2024-12-03 VITALS — HEART RATE: 77 BPM | SYSTOLIC BLOOD PRESSURE: 130 MMHG | OXYGEN SATURATION: 95 % | DIASTOLIC BLOOD PRESSURE: 74 MMHG

## 2024-12-03 DIAGNOSIS — D69.3 CHRONIC ITP (IDIOPATHIC THROMBOCYTOPENIA): Primary | ICD-10-CM

## 2024-12-03 DIAGNOSIS — M54.42 BILATERAL LOW BACK PAIN WITH LEFT-SIDED SCIATICA, UNSPECIFIED CHRONICITY: Primary | ICD-10-CM

## 2024-12-03 DIAGNOSIS — R73.03 PREDIABETES: ICD-10-CM

## 2024-12-03 DIAGNOSIS — R03.0 ELEVATED BLOOD-PRESSURE READING WITHOUT DIAGNOSIS OF HYPERTENSION: ICD-10-CM

## 2024-12-03 DIAGNOSIS — D69.1 LARGE PLATELETS: ICD-10-CM

## 2024-12-03 DIAGNOSIS — M48.062 SPINAL STENOSIS OF LUMBAR REGION WITH NEUROGENIC CLAUDICATION: Primary | ICD-10-CM

## 2024-12-03 LAB
BASOPHILS # BLD AUTO: 0.03 10*3/MM3 (ref 0–0.2)
BASOPHILS NFR BLD AUTO: 0.5 % (ref 0–1.5)
DEPRECATED RDW RBC AUTO: 43.9 FL (ref 37–54)
EOSINOPHIL # BLD AUTO: 0.2 10*3/MM3 (ref 0–0.4)
EOSINOPHIL NFR BLD AUTO: 3.5 % (ref 0.3–6.2)
ERYTHROCYTE [DISTWIDTH] IN BLOOD BY AUTOMATED COUNT: 13.5 % (ref 12.3–15.4)
HCT VFR BLD AUTO: 47 % (ref 37.5–51)
HGB BLD-MCNC: 16 G/DL (ref 13–17.7)
IMM GRANULOCYTES # BLD AUTO: 0.04 10*3/MM3 (ref 0–0.05)
IMM GRANULOCYTES NFR BLD AUTO: 0.7 % (ref 0–0.5)
LYMPHOCYTES # BLD AUTO: 0.87 10*3/MM3 (ref 0.7–3.1)
LYMPHOCYTES NFR BLD AUTO: 15.4 % (ref 19.6–45.3)
MCH RBC QN AUTO: 30.3 PG (ref 26.6–33)
MCHC RBC AUTO-ENTMCNC: 34 G/DL (ref 31.5–35.7)
MCV RBC AUTO: 89 FL (ref 79–97)
MONOCYTES # BLD AUTO: 0.62 10*3/MM3 (ref 0.1–0.9)
MONOCYTES NFR BLD AUTO: 11 % (ref 5–12)
NEUTROPHILS NFR BLD AUTO: 3.9 10*3/MM3 (ref 1.7–7)
NEUTROPHILS NFR BLD AUTO: 68.9 % (ref 42.7–76)
NRBC BLD AUTO-RTO: 0 /100 WBC (ref 0–0.2)
PLATELET # BLD AUTO: 179 10*3/MM3 (ref 140–450)
PMV BLD AUTO: 12.8 FL (ref 6–12)
RBC # BLD AUTO: 5.28 10*6/MM3 (ref 4.14–5.8)
WBC NRBC COR # BLD AUTO: 5.66 10*3/MM3 (ref 3.4–10.8)

## 2024-12-03 PROCEDURE — 36415 COLL VENOUS BLD VENIPUNCTURE: CPT

## 2024-12-03 PROCEDURE — 99214 OFFICE O/P EST MOD 30 MIN: CPT | Performed by: INTERNAL MEDICINE

## 2024-12-03 PROCEDURE — 1126F AMNT PAIN NOTED NONE PRSNT: CPT | Performed by: INTERNAL MEDICINE

## 2024-12-03 PROCEDURE — 99204 OFFICE O/P NEW MOD 45 MIN: CPT | Performed by: NEUROLOGICAL SURGERY

## 2024-12-03 PROCEDURE — 85025 COMPLETE CBC W/AUTO DIFF WBC: CPT | Performed by: INTERNAL MEDICINE

## 2024-12-03 PROCEDURE — 97110 THERAPEUTIC EXERCISES: CPT | Performed by: PHYSICAL THERAPIST

## 2024-12-03 PROCEDURE — 97530 THERAPEUTIC ACTIVITIES: CPT | Performed by: PHYSICAL THERAPIST

## 2024-12-03 RX ORDER — METHYLPREDNISOLONE 4 MG/1
TABLET ORAL
Qty: 21 TABLET | Refills: 0 | Status: SHIPPED | OUTPATIENT
Start: 2024-12-03

## 2024-12-03 NOTE — PROGRESS NOTES
Physical Therapy Daily Treatment Note  [address] [facility] [facility], [address]  [place] [address]      Patient: Paul Sterling   : 1950  Referring practitioner: AMY Hawthorne  Date of Initial Visit: Type: THERAPY  Noted: 2024  Today's Date: 12/3/2024  Patient seen for 5 sessions       Visit Diagnoses:    ICD-10-CM ICD-9-CM   1. Bilateral low back pain with left-sided sciatica, unspecified chronicity  M54.42 724.3           Subjective   Patient reports that he is feeling great.  Reports no pain and being pain free for the last 4 days.  Reports no limitations with activities.    Objective   See Exercise, Manual, and Modality Logs for complete treatment.   Added palloff press.    Assessment/Plan  Subjective reports continue to be significantly improved since beginning PT.  Function is significantly improved as evident by his ability to perform activities without limitations and being able to walk on the TM 2 miles without issues.  Feel that the patient can continue PT 1-2 more visits, then be D/C'ed to [facility].  Added palloff press for core strengthening which will improve his ability to lift objects without pain in the lumbar spine or glut region.  Held the KT secondary to the patient not feeling like he needs it.  Patient performed the routine without an increase in pain in the lumbar spine.      Timed:         Manual Therapy:    0     mins  85463;     Therapeutic Exercise:    28     mins  22859;     Neuromuscular João:    0    mins  39068;    Therapeutic Activity:     10     mins  07039;     Gait Training      0    mins  46909;  Work Conditioning     0   mins  62377       Untimed:  Electrical Stimulation:    0     mins  96544 ( );      Timed Treatment:   38   mins   Total Treatment:     38   mins    Norman Morales, PT  [place] License: [id number]

## 2024-12-03 NOTE — PROGRESS NOTES
Subjective   Patient ID: Paul Sterling is a 74 y.o. male is being seen for consultation today at the request of Ofelia Bradford MD for RADICULAR PAIN OF LEFT LOWER EXTREMITY AND LOW BACK PAIN RADIATING TO LEFT LEG. Patient had MRI L SPINE SCHEDULED 11/11/2024 @ .     Treatment: 5 sessions of physical therapy    Today patient states pain in lower back, pain in left hip, left and right leg weakness.    History of Present Illness    This patient was having severe pain at the end of October.  It began after some activities and was on the left side of his lower back.  Radiating into his left hip and down his left leg.  Pain is so severe initially he could barely even move.  Subsequent to that he was started on a Medrol Dosepak and some physical therapy.  Between the 2 of them it helped some and he has been essentially without pain for the last week or so.  At his worst the pain was all on the left side and he had no difficulty with bowel bladder control.    The following portions of the patient's history were reviewed and updated as appropriate: allergies, current medications, past family history, past medical history, past social history, past surgical history, and problem list.    Review of Systems      Objective     Vitals:    12/03/24 1241   BP: 130/74   Pulse: 77   SpO2: 95%   PainSc: 0-No pain     There is no height or weight on file to calculate BMI.    Tobacco Use: Low Risk  (12/3/2024)    Patient History     Smoking Tobacco Use: Never     Smokeless Tobacco Use: Never     Passive Exposure: Never          Physical Exam    Constitutional:       Appearance: She is well-developed.   HENT:      Head: Normocephalic and atraumatic.   Eyes:      General: Lids are normal.      Extraocular Movements: Extraocular movements intact.      Conjunctiva/sclera: Conjunctivae normal.      Pupils: Pupils are equal, round, and reactive to light.   Neck:      Vascular: No carotid bruit.   Neurological:      Motor: Motor  strength is normal.     Coordination: Coordination is intact.      Deep Tendon Reflexes:      Reflex Scores:       Tricep reflexes are 2+ on the right side and 2+ on the left side.       Bicep reflexes are 2+ on the right side and 2+ on the left side.       Brachioradialis reflexes are 2+ on the right side and 2+ on the left side.       Patellar reflexes are 2+ on the right side and 2+ on the left side.       Achilles reflexes are 2+ on the right side and 2+ on the left side.    Neurological Exam    Mental Status  Awake, alert and oriented to person, place and time. Oriented to person, place and time.    Cranial Nerves  CN II: Visual acuity is normal. Visual fields full to confrontation.  CN III, IV, VI: Extraocular movements intact bilaterally. Normal lids and orbits bilaterally. Pupils equal round and reactive to light bilaterally.  CN V: Facial sensation is normal.  CN VII: Full and symmetric facial movement.  CN IX, X: Palate elevates symmetrically. Normal gag reflex.  CN XI: Shoulder shrug strength is normal.  CN XII: Tongue midline without atrophy or fasciculations.    Motor   Strength is 5/5 throughout all four extremities.    Sensory  Sensation is intact to light touch, pinprick, vibration and proprioception in all four extremities.    Reflexes                                            Right                      Left  Brachioradialis                    2+                         2+  Biceps                                 2+                         2+  Triceps                                2+                         2+  Finger flex                           2+                         2+  Hamstring                            2+                         2+  Patellar                                2+                         2+  Achilles                                2+                         2+    Coordination    Finger-to-nose, rapid alternating movements and heel-to-shin normal bilaterally without  dysmetria.    Gait  Normal casual, toe, heel and tandem gait.    Assessment & Plan   Independent Review of Radiographic Studies:      I personally reviewed the images from the following studies.    I reviewed an MRI of the lumbar spine done on 11 November of this year.  This really looks pretty good except at L2-3 on the sagittal images.  On the axial images L1-2 is fairly open.  L2-3 does show some stenosis but I would only call in moderate.  L3-4 shows more significant narrowing although I still would call it moderate.  L4-5 shows narrowing that I would call moderate and L5-S1 is open.  There is some foraminal stenosis on the left at L4-5 but the neuroforamina at the other levels are all fairly open.    Medical Decision Making:      I told the patient that since he is feeling better we will just leave this alone.  We will give him a Medrol Dosepak to have on hand.  If it flares up again in the future and the Medrol Dosepak does not take care of it we can certainly see him back.  He agrees that plan.  I did tell him to avoid strenuously heavy lifting and activities that strain his back.    Diagnoses and all orders for this visit:    1. Spinal stenosis of lumbar region with neurogenic claudication (Primary)    Other orders  -     methylPREDNISolone (MEDROL) 4 MG dose pack; Take as directed on package instructions.  Dispense: 21 tablet; Refill: 0      Return if symptoms worsen or fail to improve.

## 2024-12-03 NOTE — LETTER
December 3, 2024     Ofelia Bradford MD  [address]  [address]  [facility] [address]    Patient: Paul Sterling   YOB: 1950   Date of Visit: 12/3/2024     Dear Ofelia Bradford MD:       Thank you for referring Paul Sterling to me for evaluation. Below are the relevant portions of my assessment and plan of care.    If you have questions, please do not hesitate to call me. I look forward to following Paul along with you.         Sincerely,        Larry Aponte MD        CC: MD Fadi Lucas Michael D., MD  12/03/24 1011  Sign when Signing Visit        REASON FOR CONSULTATION: Elevated MPV  Provide an opinion on any further workup or treatment                             REQUESTING PHYSICIAN: Ofelia Bradford MD    RECORDS OBTAINED:  Records of the patients history including those obtained from the referring provider were reviewed and summarized in detail.    HISTORY OF PRESENT ILLNESS:  The patient is a 74 y.o. year old male who is here for an opinion about the above issue.    History of Present Illness   The patient is a 74-year-old male seen for a Medicare well visit 5/28/2024 and evaluated for his history of hypertension, tremors and deconditioned fatigue and night sweats.  In discussing this with him 10/22/2024 he has had the night sweats for 30 years and he is, in particular, noting pain in his legs upon walking-dull ache-that he did not have previously but this has noticeably worsened in the last year.  Please see review of systems otherwise but this is led to at least a discussion that mean platelet volume is associated with vascular disease, cardiac disease and diabetes.    His studies included a normal CBC with mild elevation of MPV, normal PSA, CK of 245, B12 of 500, folate greater than 20, sed rate less than 1, CRP less than 0.3.    Additional CT of the chest, abdomen and pelvis was negative for adenopathy, splenomegaly or intrathoracic or intra-abdominal  abnormality.    Patient underwent testing revealing elevated IPF of 15.4, hemoglobin A1c of 5.6, normal CMP, lipid profile with total cholesterol 201, HDL 61, , essentially normal platelet function testing.  Additional examinations 10/29 included vascular surgery assessment that was felt not consistent with vasculogenic claudication.  As to the possibility of neurogenic claudication is referred for back assessment-low back pain 10/31/2024 and felt to have lumbar spondylosis with radiculopathy referred for physical therapy.    He is next seen 12/3/2024.  Wonderfully he describes that physical therapy is improving his back pain as well as his leg pain.  He is to be seen by neurosurgery today but does not expect surgical intervention at this point.  As concerns his hematologic findings his peripheral smear, indices and IPF level would be consistent with a very mild chronic ITP for which he is extremely well compensated, and has likely been so, for many years.  No intervention is necessary at this point but yearly follow-up is felt reasonable as well as attention to any new medications that might affect this or significant viral illness that might suppress his marrow as well.        Past Medical History:   Diagnosis Date   • Allergic over 40 yrs    seasonal   • Arthritis    • Noonan's esophagus    • Benign prostatic hyperplasia 2017    some possible symptoms   • Cholelithiasis June 2009    Gallbladder removed   • Colon polyp    • Dizziness     AT TIMES WHEN STANDING   • Dry eye    • Erectile dysfunction    • GERD (gastroesophageal reflux disease)    • History of colonic polyps    • HL (hearing loss) 2010   • Hyperlipidemia    • Hypertension    • Low back pain 1995    off and on for many years OK now w/ pt exercise   • Seasonal allergies    • Tremor 1996    familial tremor   • Umbilical hernia         Past Surgical History:   Procedure Laterality Date   • CHOLECYSTECTOMY N/A 2010    Dr. Angel Gordon, [facility]   •  COLONOSCOPY N/A 6/20/2018    Procedure: COLONOSCOPY to ccecum with cold bx polypectomy;  Surgeon: Stuart Hernandez MD;  Location: [facility] END[facility]COPY;  Service: Gastroenterology   • COLONOSCOPY N/A 7/30/2021    Procedure: COLONOSCOPY  TO CECUM WITH COLD BIOPSIES AND HOT SNARE POLYPECTOMY AND SALINE LIFT INJECTION AND ORISE GEL;  Surgeon: Klaus Omalley MD;  Location: [facility] ENDOSCOPY;  Service: Gastroenterology;  Laterality: N/A;  PRE:FAMILY HISTORY OF COLON CANCER  POST: POLYPS, FAIR PREP   • COLONOSCOPY N/A 12/14/2023    Procedure: COLONOSCOPY into cecum;  Surgeon: Stuart Hernandez MD;  Location: [facility] ENDOSCOPY;  Service: General;  Laterality: N/A;  Pre: History of Polyps, Family history of colon cancer  Post: Normal, Inadequate prep   • ENDOSCOPY N/A 6/20/2018    Procedure: ESOPHAGOGASTRODUODENOSCOPY with bx;  Surgeon: Stuart Hernandez MD;  Location: [facility] ENDOSCOPY;  Service: Gastroenterology   • ENDOSCOPY N/A 7/30/2021    Procedure: ESOPHAGOGASTRODUODENOSCOPY WITH COLD BIOPSIES;  Surgeon: Klaus Omalley MD;  Location: [facility] ENDOSCOPY;  Service: Gastroenterology;  Laterality: N/A;  PRE: DYSPEPSIAPOST:GASTRITIS   • ENDOSCOPY  12/14/2023    Procedure: ESOPHAGOGASTRODUODENOSCOPY;  Surgeon: Stuart Hernandez MD;  Location: [facility] ENDOSCOPY;  Service: General;;  Pre: History of Noonan's  Post: Normal   • ENDOSCOPY AND COLONOSCOPY N/A 2015    Dr. Angel Gordon, [facility]   • INGUINAL HERNIA REPAIR Bilateral 2010, 2014    Dr. Angel Gordon, [facility]   • NISSEN FUNDOPLICATION LAPAROSCOPIC N/A    • UMBILICAL HERNIA REPAIR N/A 2010    Dr. Angel Gordon, [facility]   • UMBILICAL HERNIA REPAIR N/A 11/4/2022    Procedure: UMBILICAL HERNIA REPAIR;  Surgeon: Angel Gordon MD;  Location: [facility];  Service: General;  Laterality: N/A;   • UPPER GASTROINTESTINAL ENDOSCOPY  07/30/2021        Current Outpatient Medications on File Prior to Visit   Medication Sig Dispense Refill   • atorvastatin (LIPITOR) 10 MG tablet TAKE 1  TABLET BY MOUTH DAILY 90 tablet 1   • clonazePAM (KlonoPIN) 0.5 MG tablet Take 1 tablet by mouth Daily As Needed (HAND TREMORS). 90 tablet 0   • desonide (DESOWEN) 0.05 % cream Apply 1 Application topically to the appropriate area as directed 2 (Two) Times a Day As Needed for Irritation.     • Glucosamine-Chondroit-Vit C-Mn (GLUCOSAMINE CHONDR 1500 COMPLX PO) Take 1 tablet by mouth Daily.     • hydrocortisone 1 % cream Apply 1 Application topically to the appropriate area as directed As Needed for Irritation.     • hydrocortisone 2.5 % cream Apply 1 Application topically to the appropriate area as directed As Needed for Irritation.  3   • hydrOXYzine (ATARAX) 25 MG tablet Take 1 tablet by mouth At Night As Needed for Itching. 90 tablet 2   • losartan (COZAAR) 25 MG tablet TAKE 1 TABLET BY MOUTH DAILY 90 tablet 1   • meloxicam (MOBIC) 15 MG tablet TAKE 1 TABLET BY MOUTH DAILY 90 tablet 1   • methylcellulose, Laxative, (CITRUCEL) 500 MG tablet tablet Take 6 tablets by mouth Every Morning.     • Mirabegron ER (Myrbetriq) 50 MG tablet sustained-release 24 hour 24 hr tablet Take 50 mg by mouth Daily. 90 tablet 1   • Multiple Vitamins-Minerals (PRESERVISION AREDS 2 PO) Take 2 capsules by mouth Daily.     • pantoprazole (PROTONIX) 40 MG EC tablet TAKE 1 TABLET BY MOUTH DAILY 90 tablet 1   • polyethyl glycol-propyl glycol (Systane) 0.4-0.3 % solution ophthalmic solution (artificial tears) Administer 1 drop to both eyes Every 1 (One) Hour As Needed (DRYNESS).     • triamcinolone (KENALOG) 0.1 % ointment Apply 1 Application topically to the appropriate area as directed 3 (Three) Times a Day As Needed for Irritation.       No current facility-administered medications on file prior to visit.        ALLERGIES:  No Known Allergies     Social History     Socioeconomic History   • Marital status:    Tobacco Use   • Smoking status: Never     Passive exposure: Never   • Smokeless tobacco: Never   Vaping Use   • Vaping status:  "Never Used   Substance and Sexual Activity   • Alcohol use: Yes     Alcohol/week: 5.0 standard drinks of alcohol     Types: 5 Cans of beer per week     Comment: weekly   • Drug use: No   • Sexual activity: Yes     Partners: Female     Birth control/protection: None        Family History   Problem Relation Age of Onset   • Parkinsonism Mother    • Colon cancer Mother    • Colon polyps Mother    • Cancer Mother         colon   • Other Mother         Parkinsons   • Tremor Father    • Coronary artery disease Father    • Arthritis Father    • Cancer Father         lung   • Hearing loss Father    • Heart disease Father    • Malig Hyperthermia Neg Hx         Review of Systems   Constitutional:  Positive for diaphoresis (30 years) and fatigue.   HENT: Negative.     Eyes:  Positive for visual disturbance.        Dry eyes   Respiratory:  Positive for shortness of breath.    Cardiovascular: Negative.    Gastrointestinal:  Positive for abdominal distention and diarrhea.        Reflux   Genitourinary:  Positive for frequency.   Musculoskeletal:  Positive for arthralgias, back pain and gait problem (Leg weakness after walking, mild hip pain).   Skin:  Positive for rash (Derm f/u- contact?).   Neurological: Negative.  Positive for tremors.        Dysosmia?   Psychiatric/Behavioral:  Positive for decreased concentration. Negative for sleep disturbance.         Objective    Vitals:    12/03/24 0912   BP: 156/91   Pulse: 75   Resp: 16   Temp: 97.5 °F (36.4 °C)   TempSrc: Oral   SpO2: 96%   Weight: 86.3 kg (190 lb 4.8 oz)   Height: 175.3 cm (69.02\")   PainSc: 0-No pain           12/3/2024     9:12 AM   Current Status   ECOG score 0       Physical Exam  Constitutional:       Appearance: Normal appearance. He is normal weight.   HENT:      Head: Normocephalic and atraumatic.      Nose: Nose normal.      Mouth/Throat:      Mouth: Mucous membranes are moist.      Pharynx: Oropharynx is clear.   Eyes:      Extraocular Movements: " Extraocular movements intact.      Conjunctiva/sclera: Conjunctivae normal.      Pupils: Pupils are equal, round, and reactive to light.   Cardiovascular:      Rate and Rhythm: Normal rate and regular rhythm.      Pulses: Normal pulses.      Heart sounds: Normal heart sounds.      Comments: Peripheral pulses reduced-dorsalis pedis, posterior tibial bilaterally  Pulmonary:      Effort: Pulmonary effort is normal.      Breath sounds: Normal breath sounds.   Abdominal:      General: Bowel sounds are normal.      Palpations: Abdomen is soft.   Musculoskeletal:         General: Normal range of motion.      Cervical back: Normal range of motion and neck supple.   Skin:     General: Skin is warm and dry.   Neurological:      General: No focal deficit present.      Mental Status: He is oriented to person, place, and time.   Psychiatric:         Mood and Affect: Mood normal.         Behavior: Behavior normal.           RECENT LABS:  Hematology WBC   Date Value Ref Range Status   12/03/2024 5.66 3.40 - 10.80 10*3/mm3 Final   02/10/2022 4.6 3.4 - 10.8 x10E3/uL Final     RBC   Date Value Ref Range Status   12/03/2024 5.28 4.14 - 5.80 10*6/mm3 Final   02/10/2022 5.36 4.14 - 5.80 x10E6/uL Final     Hemoglobin   Date Value Ref Range Status   12/03/2024 16.0 13.0 - 17.7 g/dL Final     Hematocrit   Date Value Ref Range Status   12/03/2024 47.0 37.5 - 51.0 % Final     Platelets   Date Value Ref Range Status   12/03/2024 179 140 - 450 10*3/mm3 Final        Peripheral smear without enlarged platelets, leukocytes.  Normal per number and differential, RBCs without morphologic abnormality.  Assessment & Plan        74-year-old male seen for follow-up on Medicare visit in May 2028 at which time he was assessed for his history of hypertension, tremors, deconditioning with associated fatigue and night sweats long-term.  Particularly when assessments with him he has noted pain in his legs upon walking which is worse in particular last years  we are asked to see him for elevated mean platelet volume the significance of which is not certain with a normal platelet count and a normal peripheral blood smear.  It is associated, incidentally, with cardiovascular disease and peripheral vascular disease as well as diabetes and, considering his extremity discomfort upon activity and symptoms of claudication, prompts a vascular assessment.  Please note that the MPV value is present over the last 2 years as being mildly elevated and is not noted on previous CBC studies prior to that time.  A chronic immunologic, albeit very mild, thrombocytopenia also remains an option.      Patient underwent testing revealing elevated IPF of 15.4, hemoglobin A1c of 5.6, normal CMP, lipid profile with total cholesterol 201, HDL 61, , essentially normal platelet function testing.  Additional examinations 10/29 included vascular surgery assessment that was felt not consistent with vasculogenic claudication.  As to the possibility of neurogenic claudication is referred for back assessment-low back pain 10/31/2024 and felt to have lumbar spondylosis with radiculopathy referred for physical therapy.       He is next seen 12/3/2024.  Wonderfully he describes that physical therapy is improving his back pain as well as his leg pain.  He is to be seen by neurosurgery today but does not expect surgical intervention at this point.  As concerns his hematologic findings his peripheral smear, indices and IPF level would be consistent with a very mild chronic ITP for which he is extremely well compensated, and has likely been so, for many years.  No intervention is necessary at this point but yearly follow-up is felt reasonable as well as attention to any new medications that might affect this or significant viral illness that might suppress his marrow as well.      Plan:  * plan yearly [id number]-RP, CBC, IPF  *Patient agrees with this plan and follow-up.

## 2024-12-10 ENCOUNTER — TREATMENT (OUTPATIENT)
Dept: PHYSICAL THERAPY | Facility: CLINIC | Age: 74
End: 2024-12-10
Payer: MEDICARE

## 2024-12-10 DIAGNOSIS — M54.42 BILATERAL LOW BACK PAIN WITH LEFT-SIDED SCIATICA, UNSPECIFIED CHRONICITY: Primary | ICD-10-CM

## 2024-12-10 PROCEDURE — 97530 THERAPEUTIC ACTIVITIES: CPT | Performed by: PHYSICAL THERAPIST

## 2024-12-10 PROCEDURE — 97110 THERAPEUTIC EXERCISES: CPT | Performed by: PHYSICAL THERAPIST

## 2024-12-10 NOTE — PROGRESS NOTES
Physical Therapy Daily Treatment Note  [address], [address]  [place] [address]      Patient: Paul Sterling   : 1950  Referring practitioner: AMY Hawthorne  Date of Initial Visit: Type: THERAPY  Noted: 2024  Today's Date: 12/10/2024  Patient seen for 6 sessions       Visit Diagnoses:    ICD-10-CM ICD-9-CM   1. Bilateral low back pain with left-sided sciatica, unspecified chronicity  M54.42 724.3           Subjective   Patient reports some soreness in the left hamstring,  Also reports right anterior hip pain while walking.  Denies low back pain.    Objective   See Exercise, Manual, and Modality Logs for complete treatment.       Assessment/Plan  Subjective reports continue to be good with regard to lumbar pain.  Added hip abd for strengthening of the hip to reduce pain in this region and to improve stability of the lumbar spine.  Feel that the patient can continue PT 1 more visit, then D/C to [facility] and he was agreeable with this.  Patient performed the routine without c/o pian.      Timed:         Manual Therapy:    0     mins  08495;     Therapeutic Exercise:    27     mins  62886;     Neuromuscular João:    0    mins  05381;    Therapeutic Activity:     8     mins  06868;     Gait Training      0    mins  01530;  Work Conditioning     0   mins  22018       Untimed:  Electrical Stimulation:    0     mins  29134 ( );      Timed Treatment:   35   mins   Total Treatment:     35   mins    Norman Morales, PT  [place] License: [id number]

## 2024-12-12 ENCOUNTER — DOCUMENTATION (OUTPATIENT)
Dept: PHYSICAL THERAPY | Facility: CLINIC | Age: 74
End: 2024-12-12

## 2024-12-12 ENCOUNTER — TREATMENT (OUTPATIENT)
Dept: PHYSICAL THERAPY | Facility: CLINIC | Age: 74
End: 2024-12-12
Payer: MEDICARE

## 2024-12-12 DIAGNOSIS — M54.42 BILATERAL LOW BACK PAIN WITH LEFT-SIDED SCIATICA, UNSPECIFIED CHRONICITY: Primary | ICD-10-CM

## 2024-12-12 NOTE — PROGRESS NOTES
Physical Therapy Daily Treatment Note  [address], [address]  [place] [address]      Patient: Paul Sterling   : 1950  Referring practitioner: AMY Hawthorne  Date of Initial Visit: Type: THERAPY  Noted: 2024  Today's Date: 2024  Patient seen for 7 sessions       Visit Diagnoses:    ICD-10-CM ICD-9-CM   1. Bilateral low back pain with left-sided sciatica, unspecified chronicity  M54.42 724.3           Subjective   Patient reports no pain in the back.    Objective   See Exercise, Manual, and Modality Logs for complete treatment.       Assessment/Plan  Subjective reports continue to be good with regard to pain.  Feel that the patient can continue with the HEP at this time and he was agreeable to that.  Patient performed the routine without visual or verbal signs of pain in the back.  Instructed the patient to reach out with any questions or concerns.      Timed:         Manual Therapy:    0     mins  11673;     Therapeutic Exercise:    28     mins  98372;     Neuromuscular João:    0    mins  14438;    Therapeutic Activity:     10     mins  39239;     Gait Training      0    mins  91818;  Work Conditioning     0   mins  33028       Untimed:  Electrical Stimulation:    0     mins  90530 ( );      Timed Treatment:   38   mins   Total Treatment:     38   mins    Norman Morales, PT  [place] License: [id number]

## 2024-12-12 NOTE — PROGRESS NOTES
Discharge Summary  Discharge Summary from Physical Therapy Report  [address]  [place] [address]    Patient Information  Paul Headleyharrison  1950    Dates  PT visit: 11/5 to 12/12/24  Number of Visits: 7     Discharge Status of Patient: See last daily note.    Goals: Partially Met    Visit Diagnoses:  No diagnosis found.    Discharge Plan: Continue with current home exercise program as instructed    Comments see above.    Date of Discharge 12/12/24        Norman Morales, PT  Physical Therapist  [place] License [id number]

## 2024-12-22 NOTE — ANESTHESIA POSTPROCEDURE EVALUATION
[facility] Clinical Pharmacy Services: Enoxaparin Consult    Sudarshan Moreno has a pharmacy consult to dose prophylactic enoxaparin per Crissy Calix's request.     Indication: VTE Prophylaxis  Home Anticoagulation: na     Relevant clinical data and objective history reviewed:  83 y.o. male   61.4 kg (135 lb 5.8 oz)   Body mass index is 21.2 kg/m².   Results from last 7 days   Lab Units 12/21/24  1908   PLATELETS 10*3/mm3 212     Estimated Creatinine Clearance: 51.5 mL/min (by C-G formula based on SCr of 0.96 mg/dL).    Assessment/Plan    Will start patient on 40mg subcutaneous every 24 hours, adjusted for renal function. Consult order will be discontinued but pharmacy will continue to follow.     Yecenia Ovalle [facility]  Clinical Pharmacist     Patient: Paul Sterling    Procedure Summary     Date: 11/04/22 Room / Location: [facility] OR  /  [facility]    Anesthesia Start: 1005 Anesthesia Stop: 1046    Procedure: UMBILICAL HERNIA REPAIR (Abdomen) Diagnosis:       Umbilical hernia without obstruction and without gangrene      (Umbilical hernia without obstruction and without gangrene [K42.9])    Surgeons: Angel Gordon MD Provider: Yoni Toledo MD    Anesthesia Type: MAC ASA Status: 3          Anesthesia Type: MAC    Vitals  Vitals Value Taken Time   /85 11/04/22 1101   Temp 36.4 °C (97.5 °F) 11/04/22 1101   Pulse 51 11/04/22 1101   Resp 16 11/04/22 1101   SpO2 100 % 11/04/22 1101           Post Anesthesia Care and Evaluation    Patient location during evaluation: bedside  Patient participation: complete - patient participated  Level of consciousness: awake  Pain management: adequate    Airway patency: patent  Anesthetic complications: No anesthetic complications  PONV Status: controlled  Cardiovascular status: acceptable  Respiratory status: acceptable  Hydration status: acceptable    Comments: ---------------------------               11/04/22                      1101         ---------------------------   BP:          127/85         Pulse:         51           Resp:          16           Temp:   36.4 °C (97.5 °F)   SpO2:         100%         ---------------------------

## 2025-06-17 ENCOUNTER — OFFICE VISIT (OUTPATIENT)
Dept: FAMILY MEDICINE CLINIC | Facility: CLINIC | Age: 75
End: 2025-06-17
Payer: MEDICARE

## 2025-06-17 VITALS
HEIGHT: 69 IN | BODY MASS INDEX: 26.36 KG/M2 | OXYGEN SATURATION: 97 % | HEART RATE: 55 BPM | SYSTOLIC BLOOD PRESSURE: 130 MMHG | DIASTOLIC BLOOD PRESSURE: 78 MMHG | RESPIRATION RATE: 18 BRPM | WEIGHT: 178 LBS

## 2025-06-17 DIAGNOSIS — Z12.5 PROSTATE CANCER SCREENING: ICD-10-CM

## 2025-06-17 DIAGNOSIS — R73.9 ELEVATED BLOOD SUGAR: ICD-10-CM

## 2025-06-17 DIAGNOSIS — E78.00 PURE HYPERCHOLESTEROLEMIA: Primary | ICD-10-CM

## 2025-06-17 DIAGNOSIS — N32.81 OVERACTIVE BLADDER: ICD-10-CM

## 2025-06-17 DIAGNOSIS — K21.9 GASTROESOPHAGEAL REFLUX DISEASE, UNSPECIFIED WHETHER ESOPHAGITIS PRESENT: ICD-10-CM

## 2025-06-17 DIAGNOSIS — I10 ESSENTIAL HYPERTENSION: ICD-10-CM

## 2025-06-17 PROCEDURE — 99214 OFFICE O/P EST MOD 30 MIN: CPT | Performed by: INTERNAL MEDICINE

## 2025-06-17 PROCEDURE — G0439 PPPS, SUBSEQ VISIT: HCPCS | Performed by: INTERNAL MEDICINE

## 2025-06-17 PROCEDURE — 1159F MED LIST DOCD IN RCRD: CPT | Performed by: INTERNAL MEDICINE

## 2025-06-17 PROCEDURE — G2211 COMPLEX E/M VISIT ADD ON: HCPCS | Performed by: INTERNAL MEDICINE

## 2025-06-17 PROCEDURE — 1160F RVW MEDS BY RX/DR IN RCRD: CPT | Performed by: INTERNAL MEDICINE

## 2025-06-17 PROCEDURE — 1126F AMNT PAIN NOTED NONE PRSNT: CPT | Performed by: INTERNAL MEDICINE

## 2025-06-17 RX ORDER — PANTOPRAZOLE SODIUM 40 MG/1
TABLET, DELAYED RELEASE ORAL
COMMUNITY
Start: 2023-06-01

## 2025-06-17 RX ORDER — CETIRIZINE HYDROCHLORIDE 10 MG/1
TABLET ORAL
COMMUNITY
Start: 2024-05-01

## 2025-06-17 RX ORDER — LOSARTAN POTASSIUM 25 MG/1
TABLET ORAL
COMMUNITY
Start: 2020-01-01

## 2025-06-17 RX ORDER — MELOXICAM 15 MG/1
TABLET ORAL
COMMUNITY
Start: 2024-01-01

## 2025-06-17 NOTE — PROGRESS NOTES
Chief Complaint  Medicare Wellness-subsequent (AWV)    Patient or patient representative verbalized consent for the use of Ambient Listening during the visit with  Ofelia Bradford MD for chart documentation. 6/17/2025  08:57 EDT    Subjective        Paul WETZEL Fitocaspermikel presents to [facility]    History of Present Illness  The patient is here today for a wellness visit and follow-up on hypercholesterolemia, essential hypertension, chronic ITP, overactive bladder, GERD, actinic keratoses, seborrheic keratosis, and chronic itching.    He reports no significant changes in his physical or mental health compared to the previous year. He does not take daily baby aspirin and has not required hospitalization in the past 12 months. He maintains an active lifestyle, including walking and back exercises, and consumes light beer daily, never exceeding two. He has not experienced any falls this year. He undergoes annual eye examinations and biannual dental check-ups. He has noticed a decline in his hearing, particularly in noisy environments such as bars. He is currently fasting and is open to having blood work done. His PSA levels have not been checked recently. He continues to take meloxicam 15 mg daily for back pain, which he takes with food before breakfast.    He has consulted with Dr. Lane, a neurosurgeon, and a PA  at [facility] regarding his back and leg issues--dx with spinal stenosis and neurogenic claudication.  MRI 12/2024.   Both advised against surgery. He experienced a two-week period of severe lower back pain radiating to his buttocks, attributed to crushed disks. Physical therapy has been beneficial, and he performs 45 minutes of exercises daily. He also walks extensively, which he finds helpful. However, his physical therapist advised him to reduce walking if it causes pain. He was diagnosed with neurogenic claudication by his physical therapist and Dr. Lane. A circulatory  "specialist confirmed normal circulation. He describes his leg pain as feeling like walking up a mountain, which does not improve with continued walking. His symptoms vary day-to-day, sometimes preventing him from walking a mile, a significant change from his previous routine of walking 3 miles a day, 5 days a week. He discussed this with Dr. Lane, who did not recommend surgery. He reports no other muscle pain. An MRI of his lower back was performed recently. He is currently undergoing physical therapy at [facility]. He has a history of back problems, including an injury from mountain biking at age 41.    He has overactive bladder and is on Myrbetriq 50 mg daily. He reports that Myrbetriq is somewhat effective for his bladder condition.    He takes pantoprazole 40 mg daily for GERD.    He sees Dr. Paul Hanna for dermatology. He has a history of actinic keratoses and seborrheic keratosis. He also sees him for chronic itching. His itching has improved significantly with the use of triamcinolone cream. He experiences itching under his arms and on the side of his body, more so on his torso, accompanied by a rash of small red bumps. He applies desonide to his underarm and triamcinolone to the other areas, which resolves the rash within two days. His dermatologist suspects eczema.    SOCIAL HISTORY  He usually has a light beer every day but never more than two a day.      Objective   Vital Signs:  /78   Pulse 55   Resp 18   Ht 175.3 cm (69.02\")   Wt 80.7 kg (178 lb)   SpO2 97%   BMI 26.27 kg/m²   Estimated body mass index is 26.27 kg/m² as calculated from the following:    Height as of this encounter: 175.3 cm (69.02\").    Weight as of this encounter: 80.7 kg (178 lb).    BMI is >= 25 and <30. (Overweight) The following options were offered after discussion;: exercise counseling/recommendations      Physical Exam  Vitals and nursing note reviewed.   Constitutional:       Appearance: Normal appearance. He is " well-developed.   HENT:      Head: Normocephalic and atraumatic.      Right Ear: External ear normal.      Left Ear: External ear normal.   Eyes:      Extraocular Movements: Extraocular movements intact.      Conjunctiva/sclera: Conjunctivae normal.   Neck:      Vascular: No carotid bruit.   Cardiovascular:      Rate and Rhythm: Normal rate and regular rhythm.      Heart sounds: Normal heart sounds.      Comments: No bruits  Pulmonary:      Effort: Pulmonary effort is normal. No respiratory distress.      Breath sounds: Normal breath sounds. No stridor. No wheezing, rhonchi or rales.   Chest:      Chest wall: No tenderness.   Abdominal:      General: Bowel sounds are normal. There is no distension.      Palpations: Abdomen is soft. There is no mass.      Tenderness: There is no abdominal tenderness. There is no guarding or rebound.      Hernia: No hernia is present.   Musculoskeletal:      Cervical back: Neck supple.      Right lower leg: No edema.      Left lower leg: No edema.   Lymphadenopathy:      Cervical: No cervical adenopathy.   Skin:     General: Skin is warm.   Neurological:      Mental Status: He is alert and oriented to person, place, and time. Mental status is at baseline.   Psychiatric:         Mood and Affect: Mood normal.         Behavior: Behavior normal.         Thought Content: Thought content normal.         Judgment: Judgment normal.        Result Review :                   Assessment and Plan   Diagnoses and all orders for this visit:    1. Pure hypercholesterolemia (Primary)  Assessment & Plan:              Orders:  -     Comprehensive Metabolic Panel  -     CBC & Differential  -     Hemoglobin A1c  -     Lipid Panel With LDL / HDL Ratio  -     TSH  -     T4, Free    2. Gastroesophageal reflux disease, unspecified whether esophagitis present  -     Comprehensive Metabolic Panel  -     CBC & Differential  -     Hemoglobin A1c  -     Lipid Panel With LDL / HDL Ratio  -     TSH  -     T4,  Free    3. Elevated blood sugar  -     Comprehensive Metabolic Panel  -     CBC & Differential  -     Hemoglobin A1c  -     Lipid Panel With LDL / HDL Ratio  -     TSH  -     T4, Free    4. Overactive bladder    5. Essential hypertension  -     Comprehensive Metabolic Panel  -     CBC & Differential  -     Hemoglobin A1c  -     Lipid Panel With LDL / HDL Ratio  -     TSH  -     T4, Free    6. Prostate cancer screening  -     PSA Screen             Assessment & Plan  1. Wellness visit.  His cholesterol levels are slightly elevated, with an LDL of 119, but his HDL levels are satisfactory. His A1c levels have shown improvement from the initial stage of prediabetes. He has a disc bulge at L4-L5, with some disc material protruding onto the right nerve and a minor disc protrusion on the left. There is also moderate retrolisthesis at L5-S1, causing some narrowing and bulging of the disc material at that level. Dr. Lane diagnosed him with spinal stenosis in the lumbar region with neurogenic claudication and prescribed a steroid pack 12/2024.  I recommend that he get back into see him and discuss additional measures.   A comprehensive set of labs will be ordered, including platelet count, A1c, cholesterol, kidney and liver function tests, glucose, thyroid, and PSA.    2. Essential hypertension.  He is currently on losartan 25 mg daily.  No changes to his medication regimen are necessary at this time.  Fasting labs ordered    3. Overactive bladder.  He is on Myrbetriq 50 mg daily.  He reports that the medication helps but is not perfect.    4. Gastroesophageal reflux disease (GERD).  He takes pantoprazole 40 mg daily.    5. Chronic idiopathic thrombocytopenic purpura (ITP).  His hematologist has recommended monitoring his platelet count every six months.  If the platelet count remains stable, no further action is needed. If it drops, he will need to see his hematologist.    6. Actinic keratoses and seborrheic keratosis,  eczema flares--itching  He sees Dr. Paul Hanna for dermatology follow-ups.  No new issues reported.    7. Chronic itching due to eczema  He uses triamcinolone cream more aggressively as advised by his dermatologist, which has significantly helped.  He also uses desonide cream for sensitive areas like underarms.    8. Lower back pain.  He has been diagnosed with spinal stenosis in the lumbar region with neurogenic claudication.  Physical therapy and daily exercises have been beneficial.  He will continue with his current regimen and follow up with Dr. Lane for further evaluation.    Follow-up  The patient will follow up in 1 year.         Follow Up   No follow-ups on file.  Patient was given instructions and counseling regarding his condition or for health maintenance advice. Please see specific information pulled into the AVS if appropriate.           Ofelia Bradford MD   09:21 EDT   [unfilled]

## 2025-06-17 NOTE — PROGRESS NOTES
Subjective   The ABCs of the Annual Wellness Visit  Medicare Wellness Visit      Paul Sterling is a 75 y.o. patient who presents for a Medicare Wellness Visit.    The following portions of the patient's history were reviewed and   updated as appropriate: allergies, current medications, past family history, past medical history, past social history, past surgical history, and problem list.    Compared to one year ago, the patient's physical   health is the same.  Compared to one year ago, the patient's mental   health is the same.    Recent Hospitalizations:  He was not admitted to the hospital during the last year.     Current Medical Providers:  Patient Care Team:  Ofelia Bradford MD as PCP - General (Internal Medicine)  Ofelia Bradford MD as Referring Physician (Internal Medicine)  Larry Aponte MD as Consulting Physician (Hematology and Oncology)    Outpatient Medications Prior to Visit   Medication Sig Dispense Refill    cetirizine (ZyrTEC Allergy) 10 MG tablet       clonazePAM (KlonoPIN) 0.5 MG tablet Take 1 tablet by mouth Daily As Needed (HAND TREMORS). 90 tablet 0    desonide (DESOWEN) 0.05 % cream Apply 1 Application topically to the appropriate area as directed 2 (Two) Times a Day As Needed for Irritation.      hydrocortisone 1 % cream Apply 1 Application topically to the appropriate area as directed As Needed for Irritation.      hydrocortisone 2.5 % cream Apply 1 Application topically to the appropriate area as directed As Needed for Irritation.  3    losartan (COZAAR) 25 MG tablet       meloxicam (MOBIC) 15 MG tablet       Mirabegron ER (Myrbetriq) 50 MG tablet sustained-release 24 hour 24 hr tablet Take 50 mg by mouth Daily. 90 tablet 1    Multiple Vitamins-Minerals (PRESERVISION AREDS 2 PO) Take 2 capsules by mouth Daily.      pantoprazole (PROTONIX) 40 MG EC tablet       polyethyl glycol-propyl glycol (Systane) 0.4-0.3 % solution ophthalmic solution (artificial tears) Administer 1  drop to both eyes Every 1 (One) Hour As Needed (DRYNESS).      triamcinolone (KENALOG) 0.1 % ointment Apply 1 Application topically to the appropriate area as directed 3 (Three) Times a Day As Needed for Irritation.      atorvastatin (LIPITOR) 10 MG tablet TAKE 1 TABLET BY MOUTH DAILY 90 tablet 1    Glucosamine-Chondroit-Vit C-Mn (GLUCOSAMINE CHONDR 1500 COMPLX PO) Take 1 tablet by mouth Daily.      hydrOXYzine (ATARAX) 25 MG tablet Take 1 tablet by mouth At Night As Needed for Itching. 90 tablet 2    methylcellulose, Laxative, (CITRUCEL) 500 MG tablet tablet Take 6 tablets by mouth Every Morning.      methylPREDNISolone (MEDROL) 4 MG dose pack Take as directed on package instructions. 21 tablet 0     No facility-administered medications prior to visit.     No opioid medication identified on active medication list. I have reviewed chart for other potential  high risk medication/s and harmful drug interactions in the elderly.      Aspirin is not on active medication list.  Aspirin use is not indicated based on review of current medical condition/s. Risk of harm outweighs potential benefits.  .    Patient Active Problem List   Diagnosis    Well adult exam    Rash    Tremor of both hands    Insomnia    Encounter for immunization    Hyperlipidemia    Elevated blood-pressure reading without diagnosis of hypertension    Rectal bleeding    Family history of colon cancer    Family history of colonic polyps    Colon cancer screening    History of Noonan's esophagus    Gastroesophageal reflux disease    Polyp of colon    Cough    Diarrhea    Herniated lumbar intervertebral disc    DDD (degenerative disc disease), lumbar    Acute low back pain due to spinal disorder    Overactive bladder    Essential tremor    Umbilical hernia without obstruction and without gangrene    Large platelets    Chronic ITP (idiopathic thrombocytopenia)     Advance Care Planning Advance Directive is not on file.  ACP discussion was held with the  "patient during this visit. Patient has an advance directive (not in EMR), copy requested.            Objective   Vitals:    25 0839   BP: 130/78   Pulse: 55   Resp: 18   SpO2: 97%   Weight: 80.7 kg (178 lb)   Height: 175.3 cm (69.02\")   PainSc: 0-No pain       Estimated body mass index is 26.27 kg/m² as calculated from the following:    Height as of this encounter: 175.3 cm (69.02\").    Weight as of this encounter: 80.7 kg (178 lb).    BMI is >= 25 and <30. (Overweight) The following options were offered after discussion;: exercise counseling/recommendations           Does the patient have evidence of cognitive impairment? No                                                                                               Health  Risk Assessment    Smoking Status:  Social History     Tobacco Use   Smoking Status Never    Passive exposure: Never   Smokeless Tobacco Never     Alcohol Consumption:  Social History     Substance and Sexual Activity   Alcohol Use Yes    Alcohol/week: 5.0 standard drinks of alcohol    Types: 5 Cans of beer per week    Comment: weekly       Fall Risk Screen  STEADI Fall Risk Assessment was completed, and patient is at LOW risk for falls.Assessment completed on:2025    Depression Screening   Little interest or pleasure in doing things? Not at all   Feeling down, depressed, or hopeless? Not at all   PHQ-2 Total Score 0      Health Habits and Functional and Cognitive Screenin/10/2025     5:32 PM   Functional & Cognitive Status   Do you have difficulty preparing food and eating? No   Do you have difficulty bathing yourself, getting dressed or grooming yourself? No   Do you have difficulty using the toilet? No   Do you have difficulty moving around from place to place? No   Do you have trouble with steps or getting out of a bed or a chair? No   Current Diet Well Balanced Diet   Dental Exam Up to date   Eye Exam Up to date   Exercise (times per week) 7 times per week   Current " Exercises Include Home Exercise Program (TV, Computer, Etc.);Other;Walking   Do you need help using the phone?  No   Are you deaf or do you have serious difficulty hearing?  No   Do you need help to go to places out of walking distance? No   Do you need help shopping? No   Do you need help preparing meals?  No   Do you need help with housework?  No   Do you need help with laundry? No   Do you need help taking your medications? No   Do you need help managing money? No   Do you ever drive or ride in a car without wearing a seat belt? No   Have you felt unusual stress, anger or loneliness in the last month? No   Who do you live with? Alone   If you need help, do you have trouble finding someone available to you? No   Have you been bothered in the last four weeks by sexual problems? No   Do you have difficulty concentrating, remembering or making decisions? No           Age-appropriate Screening Schedule:  Refer to the list below for future screening recommendations based on patient's age, sex and/or medical conditions. Orders for these recommended tests are listed in the plan section. The patient has been provided with a written plan.    Health Maintenance List  Health Maintenance   Topic Date Due    ZOSTER VACCINE (2 of 2) 07/27/2012    INFLUENZA VACCINE  07/01/2025    LIPID PANEL  10/22/2025    ANNUAL WELLNESS VISIT  06/17/2026    TDAP/TD VACCINES (2 - Td or Tdap) 03/22/2028    COLORECTAL CANCER SCREENING  12/14/2028    HEPATITIS C SCREENING  Completed    Pneumococcal Vaccine 50+  Completed    COVID-19 Vaccine  Discontinued                                                                                                                                                CMS Preventative Services Quick Reference  Risk Factors Identified During Encounter  Hearing Problem: WE DISCUSSED HEARING LOSS AND CONNECTION WITH COGNITIVE DECLINE  Dental Screening Recommended  Vision Screening Recommended    The above risks/problems  have been discussed with the patient.  Pertinent information has been shared with the patient in the After Visit Summary.  An After Visit Summary and PPPS were made available to the patient.    Follow Up:   Next Medicare Wellness visit to be scheduled in 1 year.     Assessment & Plan  Pure hypercholesterolemia                 Follow Up:   No follow-ups on file.

## 2025-06-18 LAB
ALBUMIN SERPL-MCNC: 4.4 G/DL (ref 3.5–5.2)
ALBUMIN/GLOB SERPL: 2.8 G/DL
ALP SERPL-CCNC: 47 U/L (ref 39–117)
ALT SERPL-CCNC: 24 U/L (ref 1–41)
AST SERPL-CCNC: 23 U/L (ref 1–40)
BASOPHILS # BLD AUTO: 0.01 10*3/MM3 (ref 0–0.2)
BASOPHILS NFR BLD AUTO: 0.2 % (ref 0–1.5)
BILIRUB SERPL-MCNC: 0.5 MG/DL (ref 0–1.2)
BUN SERPL-MCNC: 17 MG/DL (ref 8–23)
BUN/CREAT SERPL: 19.3 (ref 7–25)
CALCIUM SERPL-MCNC: 9.4 MG/DL (ref 8.6–10.5)
CHLORIDE SERPL-SCNC: 107 MMOL/L (ref 98–107)
CHOLEST SERPL-MCNC: 173 MG/DL (ref 0–200)
CO2 SERPL-SCNC: 24.2 MMOL/L (ref 22–29)
CREAT SERPL-MCNC: 0.88 MG/DL (ref 0.76–1.27)
EGFRCR SERPLBLD CKD-EPI 2021: 90.2 ML/MIN/1.73
EOSINOPHIL # BLD AUTO: 0.22 10*3/MM3 (ref 0–0.4)
EOSINOPHIL NFR BLD AUTO: 5 % (ref 0.3–6.2)
ERYTHROCYTE [DISTWIDTH] IN BLOOD BY AUTOMATED COUNT: 13.6 % (ref 12.3–15.4)
GLOBULIN SER CALC-MCNC: 1.6 GM/DL
GLUCOSE SERPL-MCNC: 107 MG/DL (ref 65–99)
HBA1C MFR BLD: 5.7 % (ref 4.8–5.6)
HCT VFR BLD AUTO: 42.7 % (ref 37.5–51)
HDLC SERPL-MCNC: 60 MG/DL (ref 40–60)
HGB BLD-MCNC: 14.4 G/DL (ref 13–17.7)
IMM GRANULOCYTES # BLD AUTO: 0.01 10*3/MM3 (ref 0–0.05)
IMM GRANULOCYTES NFR BLD AUTO: 0.2 % (ref 0–0.5)
LDLC SERPL CALC-MCNC: 102 MG/DL (ref 0–100)
LDLC/HDLC SERPL: 1.71 {RATIO}
LYMPHOCYTES # BLD AUTO: 0.74 10*3/MM3 (ref 0.7–3.1)
LYMPHOCYTES NFR BLD AUTO: 16.8 % (ref 19.6–45.3)
MCH RBC QN AUTO: 30.9 PG (ref 26.6–33)
MCHC RBC AUTO-ENTMCNC: 33.7 G/DL (ref 31.5–35.7)
MCV RBC AUTO: 91.6 FL (ref 79–97)
MONOCYTES # BLD AUTO: 0.5 10*3/MM3 (ref 0.1–0.9)
MONOCYTES NFR BLD AUTO: 11.3 % (ref 5–12)
NEUTROPHILS # BLD AUTO: 2.93 10*3/MM3 (ref 1.7–7)
NEUTROPHILS NFR BLD AUTO: 66.5 % (ref 42.7–76)
PLATELET # BLD AUTO: 166 10*3/MM3 (ref 140–450)
POTASSIUM SERPL-SCNC: 4.4 MMOL/L (ref 3.5–5.2)
PROT SERPL-MCNC: 6 G/DL (ref 6–8.5)
PSA SERPL-MCNC: 0.52 NG/ML (ref 0–4)
RBC # BLD AUTO: 4.66 10*6/MM3 (ref 4.14–5.8)
SODIUM SERPL-SCNC: 140 MMOL/L (ref 136–145)
T4 FREE SERPL-MCNC: 1.04 NG/DL (ref 0.92–1.68)
TRIGL SERPL-MCNC: 53 MG/DL (ref 0–150)
TSH SERPL DL<=0.005 MIU/L-ACNC: 2.83 UIU/ML (ref 0.27–4.2)
VLDLC SERPL CALC-MCNC: 11 MG/DL (ref 5–40)
WBC # BLD AUTO: 4.41 10*3/MM3 (ref 3.4–10.8)

## 2025-07-23 NOTE — TELEPHONE ENCOUNTER
"  Hub staff attempted to follow warm transfer process and was unsuccessful     Caller: Paul Sterling \"JENARO\"    Relationship to patient: Self    Best call back number: [phone]     Patient is needing: CALLING BACK WILLIAM           "

## 2025-07-23 NOTE — TELEPHONE ENCOUNTER
"    Hub staff attempted to follow warm transfer process and was unsuccessful     Caller: Paul Sterling \"JENARO\"    Relationship to patient: Self    Best call back number: [phone]     Patient is needing: CALLING WILLIAM BACK           "

## 2025-07-24 RX ORDER — PANTOPRAZOLE SODIUM 40 MG/1
40 TABLET, DELAYED RELEASE ORAL DAILY
Qty: 90 TABLET | Refills: 1 | Status: SHIPPED | OUTPATIENT
Start: 2025-07-24 | End: 2025-07-28 | Stop reason: SDUPTHER

## 2025-07-24 RX ORDER — LOSARTAN POTASSIUM 25 MG/1
25 TABLET ORAL DAILY
Qty: 90 TABLET | Refills: 1 | Status: SHIPPED | OUTPATIENT
Start: 2025-07-24 | End: 2025-07-28 | Stop reason: SDUPTHER

## 2025-07-24 NOTE — TELEPHONE ENCOUNTER
"        Hub staff attempted to follow warm transfer process and was unsuccessful     Caller: Paul Sterling \"JENARO\"    Relationship to patient: Self    Best call back number: [phone]     Patient is needing: PATIENT WAS CALLING WILLIAM BACK.  HE SAID TO MESSAGE HIM ON MYCHART SINCE HE IS UNABLE TO GET IN TOUCH WITH YOU BY PHONE AND HIS MEDICATION IS BEING HELD UP BECAUSE OF IT.    "

## 2025-07-24 NOTE — TELEPHONE ENCOUNTER
Rx Refill Note  Requested Prescriptions     Pending Prescriptions Disp Refills    losartan (COZAAR) 25 MG tablet 90 tablet 1     Sig: Take 1 tablet by mouth Daily.    pantoprazole (PROTONIX) 40 MG EC tablet 90 tablet 1     Sig: Take 1 tablet by mouth Daily.      Last office visit with prescribing clinician: 6/17/2025   Last telemedicine visit with prescribing clinician: Visit date not found   Next office visit with prescribing clinician: 6/30/2026                         Would you like a call back once the refill request has been completed: [] Yes [] No    If the office needs to give you a call back, can they leave a voicemail: [] Yes [] No    Wagner García MA  07/24/25, 09:11 EDT

## 2025-07-28 ENCOUNTER — PATIENT MESSAGE (OUTPATIENT)
Dept: FAMILY MEDICINE CLINIC | Facility: CLINIC | Age: 75
End: 2025-07-28
Payer: MEDICARE

## 2025-07-28 RX ORDER — PANTOPRAZOLE SODIUM 40 MG/1
40 TABLET, DELAYED RELEASE ORAL DAILY
Qty: 90 TABLET | Refills: 1 | Status: SHIPPED | OUTPATIENT
Start: 2025-07-28

## 2025-07-28 RX ORDER — LOSARTAN POTASSIUM 25 MG/1
25 TABLET ORAL DAILY
Qty: 90 TABLET | OUTPATIENT
Start: 2025-07-28

## 2025-07-28 RX ORDER — PANTOPRAZOLE SODIUM 40 MG/1
40 TABLET, DELAYED RELEASE ORAL DAILY
Qty: 90 TABLET | OUTPATIENT
Start: 2025-07-28

## 2025-07-28 RX ORDER — LOSARTAN POTASSIUM 25 MG/1
25 TABLET ORAL DAILY
Qty: 90 TABLET | Refills: 1 | Status: SHIPPED | OUTPATIENT
Start: 2025-07-28

## 2025-07-28 NOTE — TELEPHONE ENCOUNTER
Rx Refill Note  Requested Prescriptions     Pending Prescriptions Disp Refills    losartan (COZAAR) 25 MG tablet 90 tablet 1     Sig: Take 1 tablet by mouth Daily.    pantoprazole (PROTONIX) 40 MG EC tablet 90 tablet 1     Sig: Take 1 tablet by mouth Daily.      Last office visit with prescribing clinician: 6/17/2025   Last telemedicine visit with prescribing clinician: Visit date not found   Next office visit with prescribing clinician: 6/30/2026                         Would you like a call back once the refill request has been completed: [] Yes [] No    If the office needs to give you a call back, can they leave a voicemail: [] Yes [] No    Bhargavi Adhikari LPN  07/28/25, 11:49 EDT

## 2025-08-29 DIAGNOSIS — G25.0 ESSENTIAL TREMOR: ICD-10-CM

## 2025-08-29 RX ORDER — CLONAZEPAM 0.5 MG/1
0.5 TABLET ORAL DAILY PRN
Qty: 30 TABLET | Refills: 0 | Status: SHIPPED | OUTPATIENT
Start: 2025-08-29

## (undated) DEVICE — LN SMPL CO2 SHTRM SD STREAM W/M LUER

## (undated) DEVICE — ADAPT CLN BIOGUARD AIR/H2O DISP

## (undated) DEVICE — THE CARR-LOCKE INJECTION NEEDLE IS A SINGLE USE, DISPOSABLE, FLEXIBLE SHEATH INJECTION NEEDLE USED FOR THE INJECTION OF VARIOUS TYPES OF MEDIA THROUGH FLEXIBLE ENDOSCOPES.

## (undated) DEVICE — BITEBLOCK OMNI BLOC

## (undated) DEVICE — PK PROC MINOR TOWER 40

## (undated) DEVICE — KT ORCA ORCAPOD DISP STRL

## (undated) DEVICE — CANN NASL CO2 TRULINK W/O2 A/

## (undated) DEVICE — CANN O2 ETCO2 FITS ALL CONN CO2 SMPL A/ 7IN DISP LF

## (undated) DEVICE — TRAP FLD MINIVAC MEGADYNE 100ML

## (undated) DEVICE — KT SYR GEL ORISE SNGL PK 10ML

## (undated) DEVICE — SKIN PREP TRAY W/CHG: Brand: MEDLINE INDUSTRIES, INC.

## (undated) DEVICE — Device: Brand: DEFENDO AIR/WATER/SUCTION AND BIOPSY VALVE

## (undated) DEVICE — SUT VIC 5/0 PS2 18IN J495H

## (undated) DEVICE — SUT VIC 3/0 SH 27IN J416H

## (undated) DEVICE — SENSR O2 OXIMAX FNGR A/ 18IN NONSTR

## (undated) DEVICE — TBG 02 CRUSH RESIST LF CLR 7FT

## (undated) DEVICE — TUBING, SUCTION, 1/4" X 10', STRAIGHT: Brand: MEDLINE

## (undated) DEVICE — FRCP BX RADJAW4 NDL 2.8 240CM LG OG BX40

## (undated) DEVICE — GLV SURG PREMIERPRO ORTHO LTX PF SZ7.5 BRN

## (undated) DEVICE — SUT VIC 3/0 TIES 18IN J110T

## (undated) DEVICE — SINGLE-USE BIOPSY FORCEPS: Brand: RADIAL JAW 4

## (undated) DEVICE — THE SINGLE USE ETRAP – POLYP TRAP IS USED FOR SUCTION RETRIEVAL OF ENDOSCOPICALLY REMOVED POLYPS.: Brand: ETRAP

## (undated) DEVICE — SUT ETHIB 0/0 MO6 I8IN CX45D

## (undated) DEVICE — ELECTRD BLD EZ CLN MOD XLNG 2.75IN

## (undated) DEVICE — ADHS SKIN SURG TISS VISC PREMIERPRO EXOFIN HI/VISC FAST/DRY

## (undated) DEVICE — TBG PENCL TELESCP MEGADYNE SMOKE EVAC 10FT

## (undated) DEVICE — THE TORRENT IRRIGATION SCOPE CONNECTOR IS USED WITH THE TORRENT IRRIGATION TUBING TO PROVIDE IRRIGATION FLUIDS SUCH AS STERILE WATER DURING GASTROINTESTINAL ENDOSCOPIC PROCEDURES WHEN USED IN CONJUNCTION WITH AN IRRIGATION PUMP (OR ELECTROSURGICAL UNIT).: Brand: TORRENT